# Patient Record
Sex: FEMALE | Race: WHITE | NOT HISPANIC OR LATINO | Employment: OTHER | URBAN - METROPOLITAN AREA
[De-identification: names, ages, dates, MRNs, and addresses within clinical notes are randomized per-mention and may not be internally consistent; named-entity substitution may affect disease eponyms.]

---

## 2017-03-25 DIAGNOSIS — Z13.9 SCREENING: ICD-10-CM

## 2017-03-25 DIAGNOSIS — R73.01 IFG (IMPAIRED FASTING GLUCOSE): Primary | ICD-10-CM

## 2017-03-29 LAB
BUN SERPL-MCNC: 14 MG/DL (ref 8–27)
BUN/CREAT SERPL: 22 (ref 11–26)
CALCIUM SERPL-MCNC: 9.1 MG/DL (ref 8.7–10.3)
CHLORIDE SERPL-SCNC: 105 MMOL/L (ref 96–106)
CO2 SERPL-SCNC: 23 MMOL/L (ref 18–29)
CREAT SERPL-MCNC: 0.63 MG/DL (ref 0.57–1)
GLUCOSE SERPL-MCNC: 99 MG/DL (ref 65–99)
HBA1C MFR BLD: 5.6 % (ref 4.8–5.6)
HCV AB S/CO SERPL IA: <0.1 S/CO RATIO (ref 0–0.9)
POTASSIUM SERPL-SCNC: 4.5 MMOL/L (ref 3.5–5.2)
SODIUM SERPL-SCNC: 145 MMOL/L (ref 134–144)

## 2017-03-30 ENCOUNTER — OFFICE VISIT (OUTPATIENT)
Dept: FAMILY MEDICINE CLINIC | Facility: CLINIC | Age: 67
End: 2017-03-30

## 2017-03-30 VITALS
TEMPERATURE: 98.9 F | HEIGHT: 64 IN | OXYGEN SATURATION: 98 % | DIASTOLIC BLOOD PRESSURE: 84 MMHG | WEIGHT: 226 LBS | BODY MASS INDEX: 38.58 KG/M2 | SYSTOLIC BLOOD PRESSURE: 124 MMHG | HEART RATE: 84 BPM

## 2017-03-30 DIAGNOSIS — E55.9 VITAMIN D DEFICIENCY: ICD-10-CM

## 2017-03-30 DIAGNOSIS — I10 BENIGN ESSENTIAL HYPERTENSION: Primary | ICD-10-CM

## 2017-03-30 DIAGNOSIS — R73.01 IFG (IMPAIRED FASTING GLUCOSE): ICD-10-CM

## 2017-03-30 DIAGNOSIS — E78.5 HYPERLIPIDEMIA, UNSPECIFIED HYPERLIPIDEMIA TYPE: ICD-10-CM

## 2017-03-30 PROCEDURE — 99214 OFFICE O/P EST MOD 30 MIN: CPT | Performed by: FAMILY MEDICINE

## 2017-03-30 RX ORDER — ROSUVASTATIN CALCIUM 20 MG/1
20 TABLET, COATED ORAL DAILY
Qty: 90 TABLET | Refills: 1 | Status: SHIPPED | OUTPATIENT
Start: 2017-03-30 | End: 2017-10-07 | Stop reason: SDUPTHER

## 2017-03-30 RX ORDER — LISINOPRIL 20 MG/1
20 TABLET ORAL DAILY
Qty: 90 TABLET | Refills: 1 | Status: SHIPPED | OUTPATIENT
Start: 2017-03-30 | End: 2017-05-01

## 2017-03-30 NOTE — PROGRESS NOTES
Subjective   Liza Mcnair is a 66 y.o. female.   Chief Complaint   Patient presents with   • Hypertension   • Hyperlipidemia   • Impaired fasting glucose   • Vitamin D Deficiency       History of Present Illness     #1 hypertension-diagnosed in 1980s. Patient is on lisinopril 20 mg a day. She takes it everyday. She is also on Cartia XT. It was started due to A. fib. It is managed by cardiologist Dr. Ellington.  Patient is scheduled with Dr. Ellington on 4/19/17.  Patient is on aspirin 325 mg for anticoagulation. No other anticoagulation was recommended.   Patient had catheter done in 2015 by . No chest pain, no shortness of breath, no dizziness, no LE edema. She has occasional palpitations.      #2 hypercholesterolemia-patient is on Crestor 20 mg a day. She takes it everyday. She reports mild muscle aches, no cramps. She tried 2 other statins, and tolerates Crestor the best. Muscle aches are mild and do not bother patient.      #3 vitamin D deficiency-patient is on vitamin D3 at 4000 units a day. She takes it everyday.  Vitamin D is at 51.5.     #4 impaired fasting glucose-fasting blood sugar 99, A1c at 5.6 which is better from 5.7. Patient gained 19 pounds in 6 months.  She thinks that this is because she does not have time to exercise and also she eats too much.  We talked about dietitian, but patient saw dietitian twice with her  and knows what to do.    The following portions of the patient's history were reviewed and updated as appropriate: allergies, current medications, past family history, past medical history, past social history, past surgical history and problem list.    Review of Systems   Constitutional: Negative.    Respiratory: Negative.    Cardiovascular: Positive for palpitations. Negative for chest pain and leg swelling.   Psychiatric/Behavioral: Negative.          Objective   Wt Readings from Last 3 Encounters:   03/30/17 226 lb (103 kg)   12/09/16 207 lb (93.9 kg)   10/04/16 207 lb  (93.9 kg)      Vitals:    03/30/17 1347   BP: 124/84   Pulse: 84   Temp: 98.9 °F (37.2 °C)   SpO2: 98%     Temp Readings from Last 3 Encounters:   03/30/17 98.9 °F (37.2 °C)   12/09/16 98.8 °F (37.1 °C)   10/04/16 98.4 °F (36.9 °C)     BP Readings from Last 3 Encounters:   03/30/17 124/84   12/09/16 140/80   10/04/16 126/78     Pulse Readings from Last 3 Encounters:   03/30/17 84   12/09/16 102   10/04/16 82       Physical Exam   Constitutional: She is oriented to person, place, and time. She appears well-developed and well-nourished.   HENT:   Head: Normocephalic and atraumatic.   Neck: Neck supple. Carotid bruit is not present.   Cardiovascular: Normal rate, regular rhythm and normal heart sounds.    Pulmonary/Chest: Effort normal and breath sounds normal.   Lymphadenopathy:     She has no cervical adenopathy.   Neurological: She is alert and oriented to person, place, and time.   Skin: Skin is warm, dry and intact.   Psychiatric: She has a normal mood and affect. Her behavior is normal.       Assessment/Plan   Liza was seen today for hypertension, hyperlipidemia, impaired fasting glucose and vitamin d deficiency.    Diagnoses and all orders for this visit:    Benign essential hypertension  -     Comprehensive Metabolic Panel; Future  -     Lipid Panel With LDL / HDL Ratio; Future  -     Vitamin D 25 Hydroxy; Future    Hyperlipidemia, unspecified hyperlipidemia type  -     Comprehensive Metabolic Panel; Future  -     Lipid Panel With LDL / HDL Ratio; Future  -     Vitamin D 25 Hydroxy; Future    Vitamin D deficiency  -     Comprehensive Metabolic Panel; Future  -     Lipid Panel With LDL / HDL Ratio; Future  -     Vitamin D 25 Hydroxy; Future    IFG (impaired fasting glucose)  -     Comprehensive Metabolic Panel; Future  -     Lipid Panel With LDL / HDL Ratio; Future  -     Vitamin D 25 Hydroxy; Future    Other orders  -     rosuvastatin (CRESTOR) 20 MG tablet; Take 1 tablet by mouth Daily.  -     lisinopril  (PRINIVIL,ZESTRIL) 20 MG tablet; Take 1 tablet by mouth Daily.        #1 hypertension-controlled.  Continue same.  Follow-up in 6 months.      #2 hyperlipidemia-continue current treatment.  Follow-up in 6 months.      #3 impaired fasting glucose-much better, but patient needs to work on weight loss.  She will decrease portion size and will try to increase her physical activity.    #4 vitamin D deficiency-controlled.  Continue same.

## 2017-04-10 RX ORDER — LISINOPRIL 20 MG/1
TABLET ORAL
Qty: 90 TABLET | Refills: 1 | Status: SHIPPED | OUTPATIENT
Start: 2017-04-10 | End: 2017-05-01 | Stop reason: SDUPTHER

## 2017-04-10 RX ORDER — ROSUVASTATIN CALCIUM 20 MG/1
TABLET, COATED ORAL
Qty: 90 TABLET | Refills: 1 | Status: SHIPPED | OUTPATIENT
Start: 2017-04-10 | End: 2017-07-03

## 2017-04-20 ENCOUNTER — OFFICE VISIT (OUTPATIENT)
Dept: CARDIOLOGY | Facility: CLINIC | Age: 67
End: 2017-04-20

## 2017-04-20 VITALS
HEIGHT: 64 IN | SYSTOLIC BLOOD PRESSURE: 160 MMHG | RESPIRATION RATE: 16 BRPM | WEIGHT: 228 LBS | BODY MASS INDEX: 38.93 KG/M2 | HEART RATE: 93 BPM | DIASTOLIC BLOOD PRESSURE: 100 MMHG

## 2017-04-20 DIAGNOSIS — R06.02 SHORTNESS OF BREATH: Primary | ICD-10-CM

## 2017-04-20 DIAGNOSIS — I36.1 NON-RHEUMATIC TRICUSPID VALVE INSUFFICIENCY: ICD-10-CM

## 2017-04-20 DIAGNOSIS — I34.0 NON-RHEUMATIC MITRAL REGURGITATION: ICD-10-CM

## 2017-04-20 DIAGNOSIS — I10 ESSENTIAL HYPERTENSION: ICD-10-CM

## 2017-04-20 DIAGNOSIS — I48.0 PAF (PAROXYSMAL ATRIAL FIBRILLATION) (HCC): ICD-10-CM

## 2017-04-20 PROCEDURE — 99214 OFFICE O/P EST MOD 30 MIN: CPT | Performed by: INTERNAL MEDICINE

## 2017-04-20 NOTE — PROGRESS NOTES
"Date of Office Visit: 2017  Encounter Provider: Cecil Ellington MD  Place of Service: [facility]  Patient Name: Liza Mcnair  :1950    Chief complaint: Follow-up for valvular heart disease, PAF, HTN.      History of Present Illness:        Dear Dr. Acosta:    I again had the pleasure of seeing your patient in cardiology office on 2017. As you  well know, she is a very pleasant 66 year-old white female with a past medical history  significant for hypothyroidism, hypertension, hyperlipidemia, and several other medical  issues who presents for follow-up. She underwent a stress echocardiogram on   2014, for increasing dyspnea on exertion and chest pain. This showed hypokinesis   of the mid anterior septum and distal anterior wall with exertion. She also had moderate   mitral regurgitation and her ejection fraction was 63%. During the recovery phase, she   did go into atrial fibrillation with rapid ventricular response. She had not been previously   diagnosed with atrial fibrillation. She did convert back to sinus rhythm prior to discharge.   She did state that her thyroid testing had been \"off\", and she had recently switched her   medication to Synthroid.  She subsequently underwent evaluation with a RAUL on   2014. This showed up to moderate to severe mitral regurgitation, although it was   not felt to be enough to cause her symptoms alone. Her ejection fraction was again   noted to be 60-65%. A cardiac catheterization was performed on 2014, which   showed very mild non-obstructive coronary artery disease. She also wore an event   recorder in 2015 which showed no recurrence of the atrial fibrillation.      The patient presents for follow-up.  Unfortunately, she has gained approximately 22   pounds.  She also had a recent TSH of 11.1 on 3/1/2017.  She states that she had   an upper respiratory tract infection in 2016, and she " has been more   fatigued and more short of breath since that time.  She is going to see her   endocrinologist in the upcoming days to see if her Synthroid needs to be adjusted.    Her blood pressure is also grossly elevated at 160/100 today, although she states   she parked far away and was under a significant amount of stress getting into the   office.  However, her blood pressure at home has been elevated as well, with   systolic readings above 140 frequently.  She has not had any chest pain.  She   has not had any noted atrial fibrillation, and is in sinus rhythm today.    Past Medical History:   Diagnosis Date   • Abnormal stress test     Stress echo on 8/21/14 showed hypokinesis of the mid anteroseptum and distal anterior wall.   • Allergy to IVP dye     Severe itching   • Atherosclerosis     Atherosclerosis of abdominal aorta   • Gastric ulcer    • GERD (gastroesophageal reflux disease)    • Hemangioma     Hemangioma of liver (right lobe)   • Hyperlipidemia    • Hypertension    • Hypothyroidism     History of Hashimoto's and goiter - s/p total thyroidectomy in 11/12   • Migraine    • Mitral valve insufficiency     Moderate to severe by RAUL on 9/4/14.  Mild to moderate by echo on 10/28/15.   • Osteopenia    • PAF (paroxysmal atrial fibrillation)     Afib with RVR during stress echo on 08/21/14.  None on event recorder in 01/2015.   • Vitamin D deficiency disease        Past Surgical History:   Procedure Laterality Date   • CHOLECYSTECTOMY  1992   • EYE SURGERY      cataract surgery   • THYROID SURGERY  06/07/2012   • UTERINE FIBROID SURGERY      Several uterine ablation surgeries        Current Outpatient Prescriptions on File Prior to Visit   Medication Sig Dispense Refill   • aspirin 325 MG tablet Take 1 tablet by mouth daily.     • CARTIA  MG 24 hr capsule TAKE 1 CAPSULE DAILY 90 capsule 3   • Cholecalciferol (VITAMIN D-3) 1000 UNITS capsule Take by mouth. Take as directed     • levothyroxine  "(SYNTHROID) 150 MCG tablet Take 1 tablet by mouth daily.     • lisinopril (PRINIVIL,ZESTRIL) 20 MG tablet Take 1 tablet by mouth Daily. 90 tablet 1   • lisinopril (PRINIVIL,ZESTRIL) 20 MG tablet TAKE 1 TABLET DAILY 90 tablet 1   • Multiple Vitamin (ONE DAILY ESSENTIAL) tablet Take by mouth.     • rosuvastatin (CRESTOR) 20 MG tablet Take 1 tablet by mouth Daily. 90 tablet 1   • rosuvastatin (CRESTOR) 20 MG tablet TAKE 1 TABLET DAILY 90 tablet 1   • Zinc 50 MG capsule Take 1 tablet by mouth.       No current facility-administered medications on file prior to visit.      Allergies as of 04/20/2017 - Carlos as Reviewed 04/20/2017   Allergen Reaction Noted   • Iodinated diagnostic agents  12/08/2015   • Iodine  12/08/2015   • Tartrazine  12/09/2016     Social History     Social History   • Marital status:      Spouse name: N/A   • Number of children: N/A   • Years of education: N/A     Occupational History   • Not on file.     Social History Main Topics   • Smoking status: Never Smoker   • Smokeless tobacco: Never Used   • Alcohol use No   • Drug use: No   • Sexual activity: Defer     Other Topics Concern   • Not on file     Social History Narrative     Family History   Problem Relation Age of Onset   • Goiter Mother    • Pancreatic cancer Mother    • Heart failure Father    • Hypertension Father    • Diabetes Sister    • Coronary artery disease Sister      Sister with 3 vessel CABG at age 62       Review of Systems   Constitution: Positive for malaise/fatigue and weight gain.   Cardiovascular: Positive for dyspnea on exertion.   Respiratory: Positive for cough.    Musculoskeletal: Positive for joint pain and myalgias.   Neurological: Positive for excessive daytime sleepiness.   All other systems reviewed and are negative.    Objective:     Vitals:    04/20/17 1413   BP: 160/100   BP Location: Left arm   Patient Position: Sitting   Cuff Size: Adult   Pulse: 93   Resp: 16   Weight: 228 lb (103 kg)   Height: 64\" " (162.6 cm)     Body mass index is 39.14 kg/(m^2).    Physical Exam   Constitutional: She is oriented to person, place, and time. She appears well-developed and well-nourished.   HENT:   Head: Normocephalic and atraumatic.   Eyes: Conjunctivae are normal.   Neck: Neck supple.   Cardiovascular: Normal rate and regular rhythm.  Exam reveals no gallop and no friction rub.    Murmur heard.   Systolic murmur is present with a grade of 2/6  at the lower left sternal border  Pulmonary/Chest: Effort normal and breath sounds normal.   Abdominal: Soft. There is no tenderness.   Musculoskeletal: She exhibits no edema.   Neurological: She is alert and oriented to person, place, and time.   Skin: Skin is warm.   Psychiatric: She has a normal mood and affect. Her behavior is normal.     Lab Review:   Procedures    Cardiac Procedures:  1. Transesophageal echocardiogram on 09/04/2014, showed an ejection fraction of   60-65%. There was grade 1-A diastolic dysfunction. The left atrium was moderately   dilated. There was up to moderate to severe mitral regurgitation. There was mild   tricuspid regurgitation.   2. Left heart catheterization on 09/12/2014, by Dr. Cordova showed the following. The left  anterior descending had a 30% proximal stenosis. The ramus was large and normal.   The left circumflex had a 20% proximal stenosis. 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 There was a   20% proximal stenosis and 10% diffuse distal disease.   3. Event Recorder 01/2015 showed no recurrence of atrial fibrillation.   4. Echocardiogram on 10/28/2015: Ejection fraction was 69%. There was mild to   moderate mitral regurgitation. There was trace to mild tricuspid regurgitation. There   was grade 1a diastolic dysfunction.    Assessment:       Diagnosis Plan   1. Shortness of breath  Basic Metabolic Panel    Adult Transthoracic Echo Complete   2. Essential hypertension  Basic Metabolic Panel    3. Non-rheumatic mitral regurgitation  Adult Transthoracic Echo Complete   4. Non-rheumatic tricuspid valve insufficiency  Adult Transthoracic Echo Complete   5. PAF (paroxysmal atrial fibrillation)       Plan:       As noted, she has gained approximately 22 pounds, and has had elevated blood   pressure at home.  She has also been more short of breath and more fatigue.    These symptoms may all play into her TSH being 11.1, and her endocrinologist   is going to decide soon whether or not to increase her Synthroid.  For now, I am   going to increase her lisinopril from 20 mg per day to 40 mg per day.  She will   continue to take this as well as the extended release diltiazem at 120 mg per day.    I will check a basic metabolic panel next week to ensure that her potassium and   creatinine are tolerating these changes.  I am also going to recheck an   echocardiogram given her increasing shortness of breath, as well as her history   of valvular heart disease.  Her last echocardiogram actually looked very good in   terms of the mitral and tricuspid regurgitation, although these have both been   more significant in the past.  She has not had any noted atrial fibrillation.  She   did wear an event recorder which did not show any atrial fibrillation after her   stress test in 2014.  If she does have recurrent atrial fibrillation noted, systemic   anticoagulation will need to be readdressed at that time.  I will see her back in   the next 3 months, and she will notify me if her blood pressure remains elevated   at home.    Atrial Fibrillation and Atrial Flutter  Assessment  • The patient has paroxysmal atrial fibrillation  • This is non-valvular in etiology  • The patient's CHADS2-VASc score is 3  • A [id number]-[id number] score of 2 or more is considered a high risk for a thromboembolic event  • Aspirin prescribed    Plan  • Attempt to maintain sinus rhythm  • Continue aspirin for antithrombotic therapy, bleeding issues  discussed  • Continue diltiazem for rhythm control    Subjective - Objective  • The average duration of atrial fibrillation episodes is <48 hours

## 2017-05-01 RX ORDER — LISINOPRIL 40 MG/1
40 TABLET ORAL DAILY
Qty: 90 TABLET | Refills: 1 | Status: SHIPPED | OUTPATIENT
Start: 2017-05-01 | End: 2017-10-11 | Stop reason: SDUPTHER

## 2017-05-03 ENCOUNTER — LAB (OUTPATIENT)
Dept: LAB | Facility: HOSPITAL | Age: 67
End: 2017-05-03
Attending: INTERNAL MEDICINE

## 2017-05-03 ENCOUNTER — OFFICE VISIT (OUTPATIENT)
Dept: FAMILY MEDICINE CLINIC | Facility: CLINIC | Age: 67
End: 2017-05-03

## 2017-05-03 VITALS
OXYGEN SATURATION: 97 % | WEIGHT: 228 LBS | TEMPERATURE: 98.2 F | SYSTOLIC BLOOD PRESSURE: 135 MMHG | HEIGHT: 64 IN | BODY MASS INDEX: 38.93 KG/M2 | DIASTOLIC BLOOD PRESSURE: 85 MMHG | HEART RATE: 107 BPM

## 2017-05-03 DIAGNOSIS — I10 ESSENTIAL HYPERTENSION: ICD-10-CM

## 2017-05-03 DIAGNOSIS — R06.02 SHORTNESS OF BREATH: ICD-10-CM

## 2017-05-03 DIAGNOSIS — R51.9 HEADACHE, UNSPECIFIED HEADACHE TYPE: Primary | ICD-10-CM

## 2017-05-03 LAB
ANION GAP SERPL CALCULATED.3IONS-SCNC: 14.4 MMOL/L
BUN BLD-MCNC: 14 MG/DL (ref 8–23)
BUN/CREAT SERPL: 21.2 (ref 7–25)
CALCIUM SPEC-SCNC: 9.1 MG/DL (ref 8.6–10.5)
CHLORIDE SERPL-SCNC: 102 MMOL/L (ref 98–107)
CO2 SERPL-SCNC: 25.6 MMOL/L (ref 22–29)
CREAT BLD-MCNC: 0.66 MG/DL (ref 0.57–1)
GFR SERPL CREATININE-BSD FRML MDRD: 89 ML/MIN/1.73
GLUCOSE BLD-MCNC: 95 MG/DL (ref 65–99)
POTASSIUM BLD-SCNC: 3.7 MMOL/L (ref 3.5–5.2)
SODIUM BLD-SCNC: 142 MMOL/L (ref 136–145)

## 2017-05-03 PROCEDURE — 99213 OFFICE O/P EST LOW 20 MIN: CPT | Performed by: FAMILY MEDICINE

## 2017-05-03 PROCEDURE — 80048 BASIC METABOLIC PNL TOTAL CA: CPT

## 2017-05-03 PROCEDURE — 36415 COLL VENOUS BLD VENIPUNCTURE: CPT

## 2017-05-31 ENCOUNTER — HOSPITAL ENCOUNTER (OUTPATIENT)
Dept: CARDIOLOGY | Facility: HOSPITAL | Age: 67
Discharge: HOME | End: 2017-05-31
Attending: INTERNAL MEDICINE | Admitting: INTERNAL MEDICINE

## 2017-05-31 VITALS
WEIGHT: 228 LBS | HEART RATE: 85 BPM | HEIGHT: 64 IN | BODY MASS INDEX: 38.93 KG/M2 | SYSTOLIC BLOOD PRESSURE: 136 MMHG | DIASTOLIC BLOOD PRESSURE: 86 MMHG

## 2017-05-31 DIAGNOSIS — R06.02 SHORTNESS OF BREATH: ICD-10-CM

## 2017-05-31 DIAGNOSIS — I36.1 NON-RHEUMATIC TRICUSPID VALVE INSUFFICIENCY: ICD-10-CM

## 2017-05-31 DIAGNOSIS — I34.0 NON-RHEUMATIC MITRAL REGURGITATION: ICD-10-CM

## 2017-05-31 LAB
BH CV ECHO MEAS - ACS: 2 CM
BH CV ECHO MEAS - AO MAX PG (FULL): 5.8 MMHG
BH CV ECHO MEAS - AO MAX PG: 9.6 MMHG
BH CV ECHO MEAS - AO MEAN PG (FULL): 3.2 MMHG
BH CV ECHO MEAS - AO MEAN PG: 5.1 MMHG
BH CV ECHO MEAS - AO ROOT AREA (BSA CORRECTED): 1.6
BH CV ECHO MEAS - AO ROOT AREA: 8.2 CM^2
BH CV ECHO MEAS - AO ROOT DIAM: 3.2 CM
BH CV ECHO MEAS - AO V2 MAX: 154.7 CM/SEC
BH CV ECHO MEAS - AO V2 MEAN: 103 CM/SEC
BH CV ECHO MEAS - AO V2 VTI: 38.1 CM
BH CV ECHO MEAS - AVA(I,A): 1.7 CM^2
BH CV ECHO MEAS - AVA(I,D): 1.7 CM^2
BH CV ECHO MEAS - AVA(V,A): 1.8 CM^2
BH CV ECHO MEAS - AVA(V,D): 1.8 CM^2
BH CV ECHO MEAS - BSA(HAYCOCK): 2.2 M^2
BH CV ECHO MEAS - BSA: 2.1 M^2
BH CV ECHO MEAS - BZI_BMI: 39.1 KILOGRAMS/M^2
BH CV ECHO MEAS - BZI_METRIC_HEIGHT: 162.6 CM
BH CV ECHO MEAS - BZI_METRIC_WEIGHT: 103.4 KG
BH CV ECHO MEAS - CONTRAST EF (2CH): 51.8 ML/M^2
BH CV ECHO MEAS - CONTRAST EF 4CH: 57.6 ML/M^2
BH CV ECHO MEAS - EDV(MOD-SP2): 56 ML
BH CV ECHO MEAS - EDV(MOD-SP4): 59 ML
BH CV ECHO MEAS - EDV(TEICH): 233.7 ML
BH CV ECHO MEAS - EF(CUBED): 64.9 %
BH CV ECHO MEAS - EF(MOD-SP2): 51.8 %
BH CV ECHO MEAS - EF(MOD-SP4): 57.6 %
BH CV ECHO MEAS - EF(TEICH): 55.2 %
BH CV ECHO MEAS - ESV(MOD-SP2): 27 ML
BH CV ECHO MEAS - ESV(MOD-SP4): 25 ML
BH CV ECHO MEAS - ESV(TEICH): 104.8 ML
BH CV ECHO MEAS - FS: 29.5 %
BH CV ECHO MEAS - IVS/LVPW: 1.2
BH CV ECHO MEAS - IVSD: 1.1 CM
BH CV ECHO MEAS - LAT PEAK E' VEL: 7 CM/SEC
BH CV ECHO MEAS - LV DIASTOLIC VOL/BSA (35-75): 28.5 ML/M^2
BH CV ECHO MEAS - LV MASS(C)D: 305.9 GRAMS
BH CV ECHO MEAS - LV MASS(C)DI: 147.9 GRAMS/M^2
BH CV ECHO MEAS - LV MAX PG: 3.8 MMHG
BH CV ECHO MEAS - LV MEAN PG: 1.9 MMHG
BH CV ECHO MEAS - LV SYSTOLIC VOL/BSA (12-30): 12.1 ML/M^2
BH CV ECHO MEAS - LV V1 MAX: 97.5 CM/SEC
BH CV ECHO MEAS - LV V1 MEAN: 60.3 CM/SEC
BH CV ECHO MEAS - LV V1 VTI: 21.6 CM
BH CV ECHO MEAS - LVIDD: 6.7 CM
BH CV ECHO MEAS - LVIDS: 4.7 CM
BH CV ECHO MEAS - LVLD AP2: 7 CM
BH CV ECHO MEAS - LVLD AP4: 6.6 CM
BH CV ECHO MEAS - LVLS AP2: 5.9 CM
BH CV ECHO MEAS - LVLS AP4: 6.1 CM
BH CV ECHO MEAS - LVOT AREA (M): 2.8 CM^2
BH CV ECHO MEAS - LVOT AREA: 2.9 CM^2
BH CV ECHO MEAS - LVOT DIAM: 1.9 CM
BH CV ECHO MEAS - LVPWD: 0.92 CM
BH CV ECHO MEAS - MED PEAK E' VEL: 6 CM/SEC
BH CV ECHO MEAS - MV A DUR: 0.11 SEC
BH CV ECHO MEAS - MV A MAX VEL: 98 CM/SEC
BH CV ECHO MEAS - MV DEC SLOPE: 333.8 CM/SEC^2
BH CV ECHO MEAS - MV DEC TIME: 0.19 SEC
BH CV ECHO MEAS - MV E MAX VEL: 66.9 CM/SEC
BH CV ECHO MEAS - MV E/A: 0.68
BH CV ECHO MEAS - MV MAX PG: 5.2 MMHG
BH CV ECHO MEAS - MV MEAN PG: 1.7 MMHG
BH CV ECHO MEAS - MV P1/2T MAX VEL: 66.9 CM/SEC
BH CV ECHO MEAS - MV P1/2T: 58.7 MSEC
BH CV ECHO MEAS - MV V2 MAX: 114 CM/SEC
BH CV ECHO MEAS - MV V2 MEAN: 61.3 CM/SEC
BH CV ECHO MEAS - MV V2 VTI: 19.9 CM
BH CV ECHO MEAS - MVA P1/2T LCG: 3.3 CM^2
BH CV ECHO MEAS - MVA(P1/2T): 3.7 CM^2
BH CV ECHO MEAS - MVA(VTI): 3.2 CM^2
BH CV ECHO MEAS - PA MAX PG (FULL): 0.16 MMHG
BH CV ECHO MEAS - PA MAX PG: 4 MMHG
BH CV ECHO MEAS - PA V2 MAX: 100.4 CM/SEC
BH CV ECHO MEAS - PULM A REVS DUR: 0.1 SEC
BH CV ECHO MEAS - PULM A REVS VEL: 29.1 CM/SEC
BH CV ECHO MEAS - PULM DIAS VEL: 27.2 CM/SEC
BH CV ECHO MEAS - PULM S/D: 1.4
BH CV ECHO MEAS - PULM SYS VEL: 38.3 CM/SEC
BH CV ECHO MEAS - PVA(V,A): 3.8 CM^2
BH CV ECHO MEAS - PVA(V,D): 3.8 CM^2
BH CV ECHO MEAS - QP/QS: 1.4
BH CV ECHO MEAS - RV MAX PG: 3.9 MMHG
BH CV ECHO MEAS - RV MEAN PG: 2.2 MMHG
BH CV ECHO MEAS - RV V1 MAX: 98.5 CM/SEC
BH CV ECHO MEAS - RV V1 MEAN: 68.8 CM/SEC
BH CV ECHO MEAS - RV V1 VTI: 23 CM
BH CV ECHO MEAS - RVOT AREA: 3.8 CM^2
BH CV ECHO MEAS - RVOT DIAM: 2.2 CM
BH CV ECHO MEAS - RVSP: 8 MMHG
BH CV ECHO MEAS - SI(AO): 150.3 ML/M^2
BH CV ECHO MEAS - SI(CUBED): 95.6 ML/M^2
BH CV ECHO MEAS - SI(LVOT): 30.4 ML/M^2
BH CV ECHO MEAS - SI(MOD-SP2): 14 ML/M^2
BH CV ECHO MEAS - SI(MOD-SP4): 16.4 ML/M^2
BH CV ECHO MEAS - SI(TEICH): 62.3 ML/M^2
BH CV ECHO MEAS - SV(AO): 310.9 ML
BH CV ECHO MEAS - SV(CUBED): 197.8 ML
BH CV ECHO MEAS - SV(LVOT): 63 ML
BH CV ECHO MEAS - SV(MOD-SP2): 29 ML
BH CV ECHO MEAS - SV(MOD-SP4): 34 ML
BH CV ECHO MEAS - SV(RVOT): 88.1 ML
BH CV ECHO MEAS - SV(TEICH): 128.9 ML
BH CV ECHO MEAS - TAPSE (>1.6): 2.5 CM2
BH CV XLRA - RV BASE: 2.8 CM
BH CV XLRA - TDI S': 12 CM/SEC
E/E' RATIO: 10.5
LEFT ATRIUM VOLUME INDEX: 17 ML/M2

## 2017-05-31 PROCEDURE — 93306 TTE W/DOPPLER COMPLETE: CPT

## 2017-05-31 PROCEDURE — 93306 TTE W/DOPPLER COMPLETE: CPT | Performed by: INTERNAL MEDICINE

## 2017-06-01 ENCOUNTER — TELEPHONE (OUTPATIENT)
Dept: CARDIOLOGY | Facility: CLINIC | Age: 67
End: 2017-06-01

## 2017-06-20 DIAGNOSIS — R73.01 IFG (IMPAIRED FASTING GLUCOSE): ICD-10-CM

## 2017-06-20 DIAGNOSIS — E55.9 VITAMIN D DEFICIENCY: ICD-10-CM

## 2017-06-20 DIAGNOSIS — E78.5 HYPERLIPIDEMIA, UNSPECIFIED HYPERLIPIDEMIA TYPE: ICD-10-CM

## 2017-06-20 DIAGNOSIS — I10 BENIGN ESSENTIAL HYPERTENSION: ICD-10-CM

## 2017-06-28 LAB
25(OH)D3+25(OH)D2 SERPL-MCNC: 57.6 NG/ML (ref 30–100)
ALBUMIN SERPL-MCNC: 4.1 G/DL (ref 3.6–4.8)
ALBUMIN/GLOB SERPL: 1.6 {RATIO} (ref 1.2–2.2)
ALP SERPL-CCNC: 66 IU/L (ref 39–117)
ALT SERPL-CCNC: 20 IU/L (ref 0–32)
AST SERPL-CCNC: 16 IU/L (ref 0–40)
BILIRUB SERPL-MCNC: 0.7 MG/DL (ref 0–1.2)
BUN SERPL-MCNC: 12 MG/DL (ref 8–27)
BUN/CREAT SERPL: 19 (ref 12–28)
CALCIUM SERPL-MCNC: 9.1 MG/DL (ref 8.7–10.3)
CHLORIDE SERPL-SCNC: 104 MMOL/L (ref 96–106)
CHOLEST SERPL-MCNC: 146 MG/DL (ref 100–199)
CO2 SERPL-SCNC: 25 MMOL/L (ref 18–29)
CREAT SERPL-MCNC: 0.64 MG/DL (ref 0.57–1)
GLOBULIN SER CALC-MCNC: 2.6 G/DL (ref 1.5–4.5)
GLUCOSE SERPL-MCNC: 90 MG/DL (ref 65–99)
HDLC SERPL-MCNC: 79 MG/DL
LDLC SERPL CALC-MCNC: 54 MG/DL (ref 0–99)
LDLC/HDLC SERPL: 0.7 RATIO UNITS (ref 0–3.2)
POTASSIUM SERPL-SCNC: 4.6 MMOL/L (ref 3.5–5.2)
PROT SERPL-MCNC: 6.7 G/DL (ref 6–8.5)
SODIUM SERPL-SCNC: 142 MMOL/L (ref 134–144)
TRIGL SERPL-MCNC: 63 MG/DL (ref 0–149)
VLDLC SERPL CALC-MCNC: 13 MG/DL (ref 5–40)

## 2017-07-03 ENCOUNTER — OFFICE VISIT (OUTPATIENT)
Dept: FAMILY MEDICINE CLINIC | Facility: CLINIC | Age: 67
End: 2017-07-03

## 2017-07-03 VITALS
BODY MASS INDEX: 41.11 KG/M2 | HEART RATE: 100 BPM | WEIGHT: 232 LBS | HEIGHT: 63 IN | TEMPERATURE: 98.2 F | SYSTOLIC BLOOD PRESSURE: 130 MMHG | OXYGEN SATURATION: 96 % | DIASTOLIC BLOOD PRESSURE: 82 MMHG

## 2017-07-03 DIAGNOSIS — Z00.00 PHYSICAL EXAM, ANNUAL: Primary | ICD-10-CM

## 2017-07-03 PROCEDURE — 99397 PER PM REEVAL EST PAT 65+ YR: CPT | Performed by: FAMILY MEDICINE

## 2017-07-03 NOTE — PROGRESS NOTES
Subjective   Liza Mcnair is a 66 y.o. female.   Chief Complaint   Patient presents with   • Annual Exam       History of Present Illness     #1 CPE- She is here today for complete physical exam without GYN evaluation.  She saw Dr. Rashid in September 2016.  She had pelvic exam, rectal exam and breast exam which were normal.  She reports no change in past medical, past surgical and family history.  She is not allergic to any new medications.  She does not smoke cigarettes.  She never did.  She does not drink alcohol.  She exercises twice a week.  She does water aerobics.  She has dental appointments every 6 months.  She had eye exam in July 2016.  She had colonoscopy in 2013.  She is up-to-date with mammogram and vaccinations.    The following portions of the patient's history were reviewed and updated as appropriate: allergies, current medications, past family history, past medical history, past social history, past surgical history and problem list.    Review of Systems   Constitutional: Negative.    HENT: Negative.    Respiratory: Negative.    Cardiovascular: Negative.    Gastrointestinal: Negative for blood in stool.   Musculoskeletal: Positive for arthralgias.   Skin: Negative.          Objective   Wt Readings from Last 3 Encounters:   07/03/17 232 lb (105 kg)   05/31/17 228 lb (103 kg)   05/03/17 228 lb (103 kg)      Vitals:    07/03/17 1453   BP: 130/82   Pulse: 100   Temp: 98.2 °F (36.8 °C)   SpO2: 96%     Temp Readings from Last 3 Encounters:   07/03/17 98.2 °F (36.8 °C)   05/03/17 98.2 °F (36.8 °C)   03/30/17 98.9 °F (37.2 °C)     BP Readings from Last 3 Encounters:   07/03/17 130/82   05/31/17 136/86   05/03/17 135/85     Pulse Readings from Last 3 Encounters:   07/03/17 100   05/31/17 85   05/03/17 107       Physical Exam   Constitutional: She is oriented to person, place, and time. She appears well-developed and well-nourished. No distress.   HENT:   Head: Normocephalic and atraumatic. Hair is normal.    Right Ear: Hearing, tympanic membrane, external ear and ear canal normal. No drainage. No decreased hearing is noted.   Left Ear: Hearing, tympanic membrane, external ear and ear canal normal. No decreased hearing is noted.   Nose: No nasal deformity.   Mouth/Throat: Oropharynx is clear and moist.   Eyes: Conjunctivae, EOM and lids are normal. Pupils are equal, round, and reactive to light. Right eye exhibits no discharge. Left eye exhibits no discharge.   Neck: Normal range of motion. Neck supple. No JVD present. No tracheal deviation present. No thyromegaly present.   Cardiovascular: Normal rate, regular rhythm, normal heart sounds, intact distal pulses and normal pulses.  Exam reveals no gallop and no friction rub.    No murmur heard.  Pulmonary/Chest: Effort normal and breath sounds normal. No respiratory distress. She has no wheezes. She has no rales. She exhibits no tenderness.   Abdominal: Soft. She exhibits no distension and no mass. There is no tenderness. There is no rebound and no guarding. No hernia.   Musculoskeletal: Normal range of motion. She exhibits no edema, tenderness or deformity.   Lymphadenopathy:     She has no cervical adenopathy.   Neurological: She is alert and oriented to person, place, and time. She has normal reflexes. She displays normal reflexes. No cranial nerve deficit. She exhibits normal muscle tone. Coordination normal.   Skin: Skin is warm and dry. No rash noted. She is not diaphoretic. No erythema.   Psychiatric: She has a normal mood and affect. Her behavior is normal. Judgment and thought content normal.   Vitals reviewed.      Assessment/Plan   Liza was seen today for annual exam.    Diagnoses and all orders for this visit:    Physical exam, annual        #1 CPE- blood work results were reviewed with patient.  She is up-to-date with vaccinations and cancer screening.  She needs to focus on weight loss.  We talked about increasing exercise as tolerated to at least 5 days a  week, 30 minutes a day.  Decreasing portion size.  Dietitian offered.

## 2017-07-20 RX ORDER — DILTIAZEM HYDROCHLORIDE 120 MG/1
CAPSULE, EXTENDED RELEASE ORAL
Qty: 90 CAPSULE | Refills: 2 | Status: SHIPPED | OUTPATIENT
Start: 2017-07-20 | End: 2018-04-16 | Stop reason: SDUPTHER

## 2017-07-21 ENCOUNTER — OFFICE VISIT (OUTPATIENT)
Dept: CARDIOLOGY | Facility: CLINIC | Age: 67
End: 2017-07-21

## 2017-07-21 VITALS
HEART RATE: 96 BPM | BODY MASS INDEX: 41.11 KG/M2 | OXYGEN SATURATION: 98 % | HEIGHT: 63 IN | SYSTOLIC BLOOD PRESSURE: 134 MMHG | DIASTOLIC BLOOD PRESSURE: 82 MMHG | WEIGHT: 232 LBS

## 2017-07-21 DIAGNOSIS — I34.0 NON-RHEUMATIC MITRAL REGURGITATION: ICD-10-CM

## 2017-07-21 DIAGNOSIS — I36.1 NON-RHEUMATIC TRICUSPID VALVE INSUFFICIENCY: ICD-10-CM

## 2017-07-21 DIAGNOSIS — I10 ESSENTIAL HYPERTENSION: Primary | ICD-10-CM

## 2017-07-21 DIAGNOSIS — I48.0 PAROXYSMAL ATRIAL FIBRILLATION (HCC): ICD-10-CM

## 2017-07-21 PROCEDURE — 99213 OFFICE O/P EST LOW 20 MIN: CPT | Performed by: INTERNAL MEDICINE

## 2017-07-21 RX ORDER — UBIDECARENONE 100 MG
200 CAPSULE ORAL NIGHTLY
COMMUNITY

## 2017-07-21 NOTE — PROGRESS NOTES
"Date of Office Visit: 2017  Encounter Provider: Cecil Ellington MD  Place of Service: [facility] CARDIOLOGY  Patient Name: Liza Mcnair  :1950    Chief complaint: Follow-up for valvular heart disease, PAF, HTN.    History of Present Illness:        Dear Dr. Acosta:     I again had the pleasure of seeing your patient in cardiology office on 2017. As you  well know, she is a very pleasant 66 year-old white female with a past medical history  significant for hypothyroidism, hypertension, hyperlipidemia, and several other medical  issues who presents for follow-up. She underwent a stress echocardiogram on   2014, for increasing dyspnea on exertion and chest pain. This showed hypokinesis   of the mid anterior septum and distal anterior wall with exertion. She also had moderate   mitral regurgitation and her ejection fraction was 63%. During the recovery phase, she   did go into atrial fibrillation with rapid ventricular response. She had not been previously   diagnosed with atrial fibrillation. She did convert back to sinus rhythm prior to discharge.   She did state that her thyroid testing had been \"off\", and she had recently switched her   medication to Synthroid.  She subsequently underwent evaluation with a RAUL on   2014. This showed up to moderate to severe mitral regurgitation, although it was   not felt to be enough to cause her symptoms alone. Her ejection fraction was again   noted to be 60-65%. A cardiac catheterization was performed on 2014, which   showed very mild non-obstructive coronary artery disease. She also wore an event   recorder in 2015 which showed no recurrence of the atrial fibrillation.       The patient presents for follow-up.  At her last visit, she was hypertensive, and I   increased her lisinopril to 40 mg per day.  She has been doing much better with   regards to her blood pressure since that time.  She is still " short of breath, although   she attributes some of this to weight gain.  She is going to try to lose weight.  Her   echocardiogram did not show any significant progression of her valvular disease.    He has had no chest pain or palpitations.    Past Medical History:   Diagnosis Date   • Abnormal stress test     Stress echo on 8/21/14 showed hypokinesis of the mid anteroseptum and distal anterior wall.   • Allergy to IVP dye     Severe itching   • Atherosclerosis     Atherosclerosis of abdominal aorta   • Gastric ulcer    • GERD (gastroesophageal reflux disease)    • Hemangioma     Hemangioma of liver (right lobe)   • Hyperlipidemia    • Hypertension    • Hypothyroidism     History of Hashimoto's and goiter - s/p total thyroidectomy in 11/12   • Migraine    • Mitral valve insufficiency     Moderate to severe by RAUL on 9/4/14.  Mild to moderate by echo on 10/28/15.   • Osteopenia    • PAF (paroxysmal atrial fibrillation)     Afib with RVR during stress echo on 08/21/14.  None on event recorder in 01/2015.   • Vitamin D deficiency disease        Past Surgical History:   Procedure Laterality Date   • CHOLECYSTECTOMY  1992   • EYE SURGERY      cataract surgery   • THYROID SURGERY  06/07/2012   • UTERINE FIBROID SURGERY      Several uterine ablation surgeries        Current Outpatient Prescriptions on File Prior to Visit   Medication Sig Dispense Refill   • aspirin 325 MG tablet Take 1 tablet by mouth daily.     • CARTIA  MG 24 hr capsule TAKE 1 CAPSULE DAILY 90 capsule 2   • Cholecalciferol (VITAMIN D-3) 1000 UNITS capsule Take by mouth. Take as directed     • levothyroxine (SYNTHROID) 150 MCG tablet Take 1 tablet by mouth daily.     • lisinopril (PRINIVIL,ZESTRIL) 40 MG tablet Take 1 tablet by mouth Daily. 90 tablet 1   • Multiple Vitamin (ONE DAILY ESSENTIAL) tablet Take by mouth.     • rosuvastatin (CRESTOR) 20 MG tablet Take 1 tablet by mouth Daily. 90 tablet 1   • Zinc 50 MG capsule Take 1 tablet by mouth.    "    No current facility-administered medications on file prior to visit.      Allergies as of 07/21/2017 - Carlos as Reviewed 07/21/2017   Allergen Reaction Noted   • Iodinated diagnostic agents  12/08/2015   • Iodine  12/08/2015   • Tartrazine  12/09/2016     Social History     Social History   • Marital status:      Spouse name: N/A   • Number of children: N/A   • Years of education: N/A     Occupational History   • Not on file.     Social History Main Topics   • Smoking status: Never Smoker   • Smokeless tobacco: Never Used   • Alcohol use No   • Drug use: No   • Sexual activity: Defer     Other Topics Concern   • Not on file     Social History Narrative     Family History   Problem Relation Age of Onset   • Goiter Mother    • Pancreatic cancer Mother    • Heart failure Father    • Hypertension Father    • Diabetes Sister    • Coronary artery disease Sister      Sister with 3 vessel CABG at age 62       Review of Systems   Constitution: Positive for malaise/fatigue.   Cardiovascular: Positive for dyspnea on exertion.   Musculoskeletal: Positive for joint pain.   All other systems reviewed and are negative.    Objective:     Vitals:    07/21/17 1331   BP: 134/82   Pulse: 96   SpO2: 98%   Weight: 232 lb (105 kg)   Height: 63\" (160 cm)     Body mass index is 41.1 kg/(m^2).    Physical Exam   Constitutional: She is oriented to person, place, and time. She appears well-developed and well-nourished.   HENT:   Head: Normocephalic and atraumatic.   Eyes: Conjunctivae are normal.   Neck: Neck supple.   Cardiovascular: Normal rate and regular rhythm.  Exam reveals no gallop and no friction rub.    Murmur heard.   Systolic murmur is present with a grade of 2/6  at the lower left sternal border  Pulmonary/Chest: Effort normal and breath sounds normal.   Abdominal: Soft. There is no tenderness.   Musculoskeletal: She exhibits no edema.   Neurological: She is alert and oriented to person, place, and time.   Skin: Skin is " warm.   Psychiatric: She has a normal mood and affect. Her behavior is normal.     Lab Review:   Procedures    Cardiac Procedures:  1. Transesophageal echocardiogram on 09/04/2014, showed an ejection fraction of   60-65%. There was grade 1a diastolic dysfunction. The left atrium was moderately   dilated. There was up to moderate to severe mitral regurgitation. There was mild   tricuspid regurgitation.   2. Left heart catheterization on 09/12/2014, by Dr. Cordova showed the following. The left  anterior descending had a 30% proximal stenosis. The ramus was large and normal.   The left circumflex had a 20% proximal stenosis. 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 There was a   20% proximal stenosis and 10% diffuse distal disease.   3. Event Recorder 01/2015 showed no recurrence of atrial fibrillation.   4. Echocardiogram on 10/28/2015: Ejection fraction was 69%. There was mild to   moderate mitral regurgitation. There was trace to mild tricuspid regurgitation. There   was grade 1a diastolic dysfunction.  5.  Echocardiogram on 5/31/2017: The ejection fraction was 58%.  There was grade 1   diastolic dysfunction.  The left atrium was mildly dilated.  There was mild to moderate   mitral regurgitation.  There was a small anterior pericardial effusion.      Assessment:       Diagnosis Plan   1. Essential hypertension     2. Paroxysmal atrial fibrillation     3. Non-rheumatic mitral regurgitation     4. Non-rheumatic tricuspid valve insufficiency       Plan:       As noted, the patient's blood pressure is much better at this point.  She will continue   on the Cardizem CD and lisinopril.  She is less short of breath, although she still   gets short of breath with exertion.  I feel this is multifactorial, and some of this is   likely from deconditioning and recent weight gain.  She is going to try to get more   active and lose weight.  She has not had any  palpitations, and she is in sinus rhythm.   She did wear an event recorder which did not show any atrial fibrillation after her   stress test in 2014.  If she does have any recurrent atrial fibrillation, systemic   anticoagulation will need to be readdressed at that time.  Her recent   echocardiogram showed only mild to moderate mitral regurgitation and trace   tricuspid regurgitation.  She does not need another echocardiogram for at least 2   years unless her symptoms change.  For now, I will plan on seeing her back in   the office within the next 6 months unless other issues arise.    Atrial Fibrillation and Atrial Flutter  Assessment  • The patient has paroxysmal atrial fibrillation  • This is non-valvular in etiology  • The patient's CHADS2-VASc score is 3  • A [id number]-NQOt score of 2 or more is considered a high risk for a thromboembolic event  • Aspirin prescribed     Plan  • Attempt to maintain sinus rhythm  • Continue aspirin for antithrombotic therapy, bleeding issues discussed  • Continue diltiazem for rhythm control     Subjective - Objective  • The average duration of atrial fibrillation episodes is <48 hours

## 2017-10-09 RX ORDER — ROSUVASTATIN CALCIUM 20 MG/1
TABLET, COATED ORAL
Qty: 90 TABLET | Refills: 1 | Status: SHIPPED | OUTPATIENT
Start: 2017-10-09 | End: 2022-06-14 | Stop reason: SDUPTHER

## 2017-10-11 RX ORDER — LISINOPRIL 40 MG/1
TABLET ORAL
Qty: 90 TABLET | Refills: 3 | Status: SHIPPED | OUTPATIENT
Start: 2017-10-11 | End: 2018-06-04

## 2018-01-22 ENCOUNTER — OFFICE VISIT (OUTPATIENT)
Dept: CARDIOLOGY | Facility: CLINIC | Age: 68
End: 2018-01-22

## 2018-01-22 VITALS
BODY MASS INDEX: 35.32 KG/M2 | SYSTOLIC BLOOD PRESSURE: 160 MMHG | HEIGHT: 65 IN | WEIGHT: 212 LBS | DIASTOLIC BLOOD PRESSURE: 80 MMHG | HEART RATE: 88 BPM | RESPIRATION RATE: 22 BRPM

## 2018-01-22 DIAGNOSIS — I36.1 NON-RHEUMATIC TRICUSPID VALVE INSUFFICIENCY: ICD-10-CM

## 2018-01-22 DIAGNOSIS — I48.0 PAROXYSMAL ATRIAL FIBRILLATION (HCC): ICD-10-CM

## 2018-01-22 DIAGNOSIS — I34.0 NON-RHEUMATIC MITRAL REGURGITATION: Primary | ICD-10-CM

## 2018-01-22 DIAGNOSIS — I10 ESSENTIAL HYPERTENSION: ICD-10-CM

## 2018-01-22 PROCEDURE — 99213 OFFICE O/P EST LOW 20 MIN: CPT | Performed by: INTERNAL MEDICINE

## 2018-01-22 RX ORDER — CHOLECALCIFEROL (VITAMIN D3) 125 MCG
500 CAPSULE ORAL DAILY
COMMUNITY

## 2018-01-22 RX ORDER — HYDROCHLOROTHIAZIDE 12.5 MG/1
12.5 CAPSULE, GELATIN COATED ORAL AS NEEDED
COMMUNITY
End: 2019-06-05

## 2018-01-22 RX ORDER — MULTIVIT-MIN/IRON/FOLIC ACID/K 18-600-40
4000 CAPSULE ORAL DAILY
COMMUNITY

## 2018-01-22 NOTE — PROGRESS NOTES
"Date of Office Visit: 2018  Encounter Provider: Cecil Ellington MD  Place of Service: [facility] CARDIOLOGY  Patient Name: Liza Mcnair  :1950    Chief complaint: Follow-up valvular heart disease, paroxysmal atrial fibrillation,   and hypertension.    History of Present Illness:    Dear Dr. Das:    I again had the pleasure of seeing your patient in cardiology office on 2018. As you   well know, she is a very pleasant 67 year-old white female with a past medical history  significant for hypothyroidism, hypertension, hyperlipidemia, and several other medical  issues who presents for follow-up. She underwent a stress echocardiogram on   2014, for increasing dyspnea on exertion and chest pain. This showed hypokinesis   of the mid anterior septum and distal anterior wall with exertion. She also had moderate   mitral regurgitation and her ejection fraction was 63%. During the recovery phase, she   did go into atrial fibrillation with rapid ventricular response. She had not been previously   diagnosed with atrial fibrillation. She did convert back to sinus rhythm prior to discharge.   She did state that her thyroid testing had been \"off\", and she had recently switched her   medication to Synthroid.  She subsequently underwent evaluation with a RAUL on   2014. This showed up to moderate to severe mitral regurgitation, although it was   not felt to be enough to cause her symptoms alone. Her ejection fraction was again   noted to be 60-65%. A cardiac catheterization was performed on 2014, which   showed very mild non-obstructive coronary artery disease. She also wore an event   recorder in 2015 which showed no recurrence of the atrial fibrillation.     The patient presents for follow-up.  She does state that she has been under a   significant amount of stress as her  just recently started on dialysis.  She   has not had any chest pain, " and her shortness of breath remains the same with   moderate exertion.  She has not had any palpitations or noted atrial fibrillation.  Her   blood pressure has been elevated, and she states that it typically runs 138-140   systolic.  She was recently started on HCTZ on 1/10/2018.    Past Medical History:   Diagnosis Date   • Abnormal stress test     Stress echo on 8/21/14 showed hypokinesis of the mid anteroseptum and distal anterior wall.   • Allergy to IVP dye     Severe itching   • Atherosclerosis     Atherosclerosis of abdominal aorta   • Gastric ulcer    • GERD (gastroesophageal reflux disease)    • Hemangioma     Hemangioma of liver (right lobe)   • Hyperlipidemia    • Hypertension    • Hypothyroidism     History of Hashimoto's and goiter - s/p total thyroidectomy in 11/12   • Migraine    • Mitral valve insufficiency     Moderate to severe by RAUL on 9/4/14.  Mild to moderate by echo on 10/28/15.   • Osteopenia    • PAF (paroxysmal atrial fibrillation)     Afib with RVR during stress echo on 08/21/14.  None on event recorder in 01/2015.   • Vitamin D deficiency disease        Past Surgical History:   Procedure Laterality Date   • CHOLECYSTECTOMY  1992   • EYE SURGERY      cataract surgery   • THYROID SURGERY  06/07/2012   • UTERINE FIBROID SURGERY      Several uterine ablation surgeries        Current Outpatient Prescriptions on File Prior to Visit   Medication Sig Dispense Refill   • aspirin 325 MG tablet Take 1 tablet by mouth daily.     • CARTIA  MG 24 hr capsule TAKE 1 CAPSULE DAILY 90 capsule 2   • coenzyme Q10 100 MG capsule Take 100 mg by mouth Every Night.     • levothyroxine (SYNTHROID) 150 MCG tablet Take 1 tablet by mouth daily.     • lisinopril (PRINIVIL,ZESTRIL) 40 MG tablet TAKE 1 TABLET DAILY (DOSE INCREASE) 90 tablet 3   • rosuvastatin (CRESTOR) 20 MG tablet TAKE 1 TABLET DAILY 90 tablet 1   • Zinc 50 MG capsule Take 1 tablet by mouth.     • [DISCONTINUED] Cholecalciferol (VITAMIN D-3)  "1000 UNITS capsule Take by mouth. Take as directed     • [DISCONTINUED] Multiple Vitamin (ONE DAILY ESSENTIAL) tablet Take by mouth.       No current facility-administered medications on file prior to visit.      Allergies as of 01/22/2018 - Carlos as Reviewed 01/22/2018   Allergen Reaction Noted   • Iodinated diagnostic agents  12/08/2015   • Iodine  12/08/2015   • Tartrazine  12/09/2016     Social History     Social History   • Marital status:      Spouse name: N/A   • Number of children: N/A   • Years of education: N/A     Occupational History   • Not on file.     Social History Main Topics   • Smoking status: Never Smoker   • Smokeless tobacco: Never Used   • Alcohol use No   • Drug use: No   • Sexual activity: Defer     Other Topics Concern   • Not on file     Social History Narrative     Family History   Problem Relation Age of Onset   • Goiter Mother    • Pancreatic cancer Mother    • Heart failure Father    • Hypertension Father    • Diabetes Sister    • Coronary artery disease Sister      Sister with 3 vessel CABG at age 62       Review of Systems   Constitution: Positive for malaise/fatigue.   Cardiovascular: Positive for dyspnea on exertion.   Musculoskeletal: Positive for joint pain.   Psychiatric/Behavioral: The patient is nervous/anxious.    All other systems reviewed and are negative.    Objective:     Vitals:    01/22/18 1349   BP: 160/80   Pulse: 88   Resp: 22   Weight: 96.2 kg (212 lb)   Height: 163.8 cm (64.5\")     Body mass index is 35.83 kg/(m^2).    Physical Exam   Constitutional: She is oriented to person, place, and time. She appears well-developed and well-nourished.   HENT:   Head: Normocephalic and atraumatic.   Eyes: Conjunctivae are normal.   Neck: Neck supple.   Cardiovascular: Normal rate and regular rhythm.  Exam reveals no gallop and no friction rub.    Murmur heard.   Systolic murmur is present with a grade of 2/6  at the lower left sternal border  Pulmonary/Chest: Effort " normal and breath sounds normal.   Abdominal: Soft. There is no tenderness.   Musculoskeletal: She exhibits no edema.   Neurological: She is alert and oriented to person, place, and time.   Skin: Skin is warm.   Psychiatric: She has a normal mood and affect. Her behavior is normal.     Lab Review:   Procedures    Cardiac Procedures:  1. Transesophageal echocardiogram on 09/04/2014, showed an ejection fraction of   60-65%. There was grade 1a diastolic dysfunction. The left atrium was moderately   dilated. There was up to moderate to severe mitral regurgitation. There was mild   tricuspid regurgitation.   2. Left heart catheterization on 09/12/2014, by Dr. Cordova showed the following. The left  anterior descending had a 30% proximal stenosis. The ramus was large and normal.   The left circumflex had a 20% proximal stenosis. 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 There was a   20% proximal stenosis and 10% diffuse distal disease.   3. Event Recorder 01/2015 showed no recurrence of atrial fibrillation.   4. Echocardiogram on 10/28/2015: Ejection fraction was 69%. There was mild to   moderate mitral regurgitation. There was trace to mild tricuspid regurgitation. There   was grade 1a diastolic dysfunction.  5.  Echocardiogram on 5/31/2017: The ejection fraction was 58%.  There was grade 1   diastolic dysfunction.  The left atrium was mildly dilated.  There was mild to moderate   mitral regurgitation.  There was a small anterior pericardial effusion.    Assessment:       Diagnosis Plan   1. Non-rheumatic mitral regurgitation     2. Non-rheumatic tricuspid valve insufficiency     3. Paroxysmal atrial fibrillation     4. Essential hypertension       Plan:       The patient's blood pressure has been elevated recently, although HCTZ 12.5 mg per day   was recently added to the Cardizem CD and lisinopril.  She is under a significant amount   of stress with her ,  which may be contributing to her blood pressure.  Her   shortness of breath on exertion is still the same, and I still do feel that this is multifactorial.    I do feel that some of this may be from deconditioning and recent weight gain as well. She   has not had any noted atrial fibrillation since her stress test in 2014, including an event   recorder afterwards.  I am going to keep her on aspirin only for now.  Her last   echocardiogram on 5/31/2017 showed only mild to moderate mitral regurgitation.  I will   plan on another echocardiogram at the end of this year early next year.  For now, I will   plan on seeing her back in the office within 6 months.    Atrial Fibrillation and Atrial Flutter  Assessment  • The patient has paroxysmal atrial fibrillation  • This is non-valvular in etiology  • The patient's CHADS2-VASc score is 3  • A [id number] score of 2 or more is considered a high risk for a thromboembolic event  • Aspirin prescribed    Plan  • Attempt to maintain sinus rhythm  • Continue aspirin for antithrombotic therapy, bleeding issues discussed  • Continue diltiazem for rhythm control    Subjective - Objective  • The average duration of atrial fibrillation episodes is <48 hours

## 2018-04-09 RX ORDER — ROSUVASTATIN CALCIUM 20 MG/1
TABLET, COATED ORAL
Qty: 90 TABLET | Refills: 1 | OUTPATIENT
Start: 2018-04-09

## 2018-04-16 RX ORDER — DILTIAZEM HYDROCHLORIDE 120 MG/1
CAPSULE, EXTENDED RELEASE ORAL
Qty: 90 CAPSULE | Refills: 2 | Status: SHIPPED | OUTPATIENT
Start: 2018-04-16 | End: 2019-01-13 | Stop reason: SDUPTHER

## 2018-05-30 ENCOUNTER — TELEPHONE (OUTPATIENT)
Dept: CARDIOLOGY | Facility: CLINIC | Age: 68
End: 2018-05-30

## 2018-06-04 ENCOUNTER — OFFICE VISIT (OUTPATIENT)
Dept: CARDIOLOGY | Facility: CLINIC | Age: 68
End: 2018-06-04

## 2018-06-04 VITALS
HEART RATE: 82 BPM | WEIGHT: 214 LBS | OXYGEN SATURATION: 98 % | HEIGHT: 64 IN | SYSTOLIC BLOOD PRESSURE: 148 MMHG | BODY MASS INDEX: 36.54 KG/M2 | DIASTOLIC BLOOD PRESSURE: 88 MMHG

## 2018-06-04 DIAGNOSIS — I34.0 MITRAL VALVE INSUFFICIENCY, UNSPECIFIED ETIOLOGY: ICD-10-CM

## 2018-06-04 DIAGNOSIS — I48.0 PAROXYSMAL ATRIAL FIBRILLATION (HCC): ICD-10-CM

## 2018-06-04 DIAGNOSIS — I10 BENIGN ESSENTIAL HYPERTENSION: Primary | ICD-10-CM

## 2018-06-04 PROCEDURE — 99214 OFFICE O/P EST MOD 30 MIN: CPT | Performed by: PHYSICIAN ASSISTANT

## 2018-06-04 PROCEDURE — 93000 ELECTROCARDIOGRAM COMPLETE: CPT | Performed by: PHYSICIAN ASSISTANT

## 2018-06-04 RX ORDER — OLMESARTAN MEDOXOMIL 40 MG/1
40 TABLET ORAL DAILY
COMMUNITY
Start: 2018-05-12 | End: 2022-06-14 | Stop reason: SDUPTHER

## 2018-06-04 NOTE — PROGRESS NOTES
"  Date of Office Visit: 2018  Encounter Provider: CHRISTEN Beltran  Place of Service: [facility] CARDIOLOGY  Patient Name: Liza Mcnair  :1950    Chief Complaint   Patient presents with   • Coronary Artery Disease     6 month follow up   :     HPI: Liza Mcnair is a 67 y.o. female who presents today for follow-up.  Old records have been obtained and reviewed by me.  She is a patient of Dr. Ellington's with a past cardiac history significant for paroxysmal atrial fibrillation (this occurred during a stress test and resolved without recurrence), moderate to severe mitral regurgitation on RAUL, and mild nonobstructive coronary disease.  She has had a normal EF.  She was last in our office to see Dr. Ellington on 2018.  At that visit she was under a lot of stress because her  recently started dialysis.  She had no complaints of angina or heart failure, and her shortness of breath was about the same.  Her blood pressure was a little elevated, and Dr. Ellington felt this was secondary to stress.  He did not make any changes to her medical regimen, and recommendations were for her to follow back in the office within 6 months.  She is here today earlier than that appointment because of a burning sensation in her chest as well as elevated blood pressure.   She did bring a blood pressure log with her today.  Although there are some blood pressures are elevated in the 140s to 160s systolic, the majority of her blood pressures are well-controlled anywhere in the 110s to low 130s systolic.  She did have a CT scan of the chest as well as a chest x-ray in 2018 at another facility.  This showed a \"mildly prominent\" cardiac silhouette.  The CT scan of the chest with her ascending aorta is borderline aneurysmal with 3.6 cm maximum diameter.  She states that her shortness of breath on exertion is about the same as it always has been.  Her main complaint is " heartburn.  It is not associated with exertion.  She does not think it's associated with food either.  She takes 4 chewable baby aspirin daily and has been doing so for about 6 months.  She denies any anginal type chest pain, worsening shortness of breath, edema, dizziness, or sick be.  She has no orthopnea or PND.  She states that at times her legs get weak and she feels jittery.  She gets pretty tearful today when talking about her  and his medical condition.         Past Medical History:   Diagnosis Date   • Abnormal stress test     Stress echo on 8/21/14 showed hypokinesis of the mid anteroseptum and distal anterior wall.   • Allergy to IVP dye     Severe itching   • Atherosclerosis     Atherosclerosis of abdominal aorta   • Gastric ulcer    • GERD (gastroesophageal reflux disease)    • Hemangioma     Hemangioma of liver (right lobe)   • Hyperlipidemia    • Hypertension    • Hypothyroidism     History of Hashimoto's and goiter - s/p total thyroidectomy in 11/12   • Migraine    • Mitral valve insufficiency     Moderate to severe by RAUL on 9/4/14.  Mild to moderate by echo on 10/28/15.   • Osteopenia    • PAF (paroxysmal atrial fibrillation)     Afib with RVR during stress echo on 08/21/14.  None on event recorder in 01/2015.   • Vitamin D deficiency disease        Past Surgical History:   Procedure Laterality Date   • CHOLECYSTECTOMY  1992   • EYE SURGERY      cataract surgery   • THYROID SURGERY  06/07/2012   • UTERINE FIBROID SURGERY      Several uterine ablation surgeries        Social History     Social History   • Marital status:      Spouse name: N/A   • Number of children: N/A   • Years of education: N/A     Occupational History   • Not on file.     Social History Main Topics   • Smoking status: Never Smoker   • Smokeless tobacco: Never Used   • Alcohol use No   • Drug use: No   • Sexual activity: Defer     Other Topics Concern   • Not on file     Social History Narrative   • No narrative on  file       Family History   Problem Relation Age of Onset   • Goiter Mother    • Pancreatic cancer Mother    • Heart failure Father    • Hypertension Father    • Diabetes Sister    • Coronary artery disease Sister         Sister with 3 vessel CABG at age 62   • No Known Problems Maternal Grandmother    • No Known Problems Maternal Grandfather    • No Known Problems Paternal Grandmother    • No Known Problems Paternal Grandfather        Review of Systems   Constitution: Negative for chills, fever and malaise/fatigue.   Cardiovascular: Positive for dyspnea on exertion. Negative for chest pain, leg swelling, near-syncope, orthopnea, palpitations, paroxysmal nocturnal dyspnea and syncope.   Respiratory: Negative for cough and shortness of breath.    Musculoskeletal: Negative for joint pain, joint swelling and myalgias.   Gastrointestinal: Positive for heartburn. Negative for abdominal pain, diarrhea, melena, nausea and vomiting.   Genitourinary: Negative for frequency and hematuria.   Neurological: Negative for light-headedness, numbness, paresthesias and seizures.   Psychiatric/Behavioral: The patient is nervous/anxious.    Allergic/Immunologic: Negative.    All other systems reviewed and are negative.      Allergies   Allergen Reactions   • Iodinated Diagnostic Agents    • Iodine    • Tartrazine      Cat scan dye         Current Outpatient Prescriptions:   •  aspirin 325 MG tablet, Take 1 tablet by mouth daily., Disp: , Rfl:   •  CARTIA  MG 24 hr capsule, TAKE 1 CAPSULE DAILY, Disp: 90 capsule, Rfl: 2  •  Cholecalciferol (VITAMIN D) 2000 units capsule, Take 4,000 Units by mouth Daily., Disp: , Rfl:   •  coenzyme Q10 100 MG capsule, Take 100 mg by mouth Every Night., Disp: , Rfl:   •  hydrochlorothiazide (MICROZIDE) 12.5 MG capsule, Take 12.5 mg by mouth As Needed., Disp: , Rfl:   •  levothyroxine (SYNTHROID) 150 MCG tablet, Take 1 tablet by mouth daily., Disp: , Rfl:   •  olmesartan (BENICAR) 40 MG tablet, Take  "40 mg by mouth Daily., Disp: , Rfl:   •  rosuvastatin (CRESTOR) 20 MG tablet, TAKE 1 TABLET DAILY, Disp: 90 tablet, Rfl: 1  •  vitamin B-12 (CYANOCOBALAMIN) 500 MCG tablet, Take 500 mcg by mouth Daily., Disp: , Rfl:       Objective:     Vitals:    06/04/18 1122 06/04/18 1134   BP: 132/80 148/88   BP Location: Right arm Left arm   Pulse: 82    SpO2: 98%    Weight: 97.1 kg (214 lb)    Height: 162.6 cm (64\")      Body mass index is 36.73 kg/m².    PHYSICAL EXAM:    Physical Exam   Constitutional: She is oriented to person, place, and time. She appears well-developed and well-nourished. No distress.   HENT:   Head: Normocephalic and atraumatic.   Eyes: Pupils are equal, round, and reactive to light.   Neck: No JVD present. No thyromegaly present.   Cardiovascular: Normal rate, regular rhythm, normal heart sounds and intact distal pulses.    No murmur heard.  Pulmonary/Chest: Effort normal and breath sounds normal. No respiratory distress.   Abdominal: Soft. Bowel sounds are normal. She exhibits no distension. There is no splenomegaly or hepatomegaly. There is no tenderness.   Musculoskeletal: Normal range of motion. She exhibits no edema.   Neurological: She is alert and oriented to person, place, and time.   Skin: Skin is warm and dry. She is not diaphoretic. No erythema.   Psychiatric: She has a normal mood and affect. Her behavior is normal. Judgment normal.         ECG 12 Lead  Date/Time: 6/4/2018 11:42 AM  Performed by: REHANA REED  Authorized by: REHANA REED   Comparison: not compared with previous ECG   Previous ECG: no previous ECG available  Rhythm: sinus rhythm  BPM: 82  Clinical impression: normal ECG  Comments: Indication: Mitral regurgitation.              Assessment:       Diagnosis Plan   1. Benign essential hypertension  ECG 12 Lead   2. Paroxysmal atrial fibrillation  ECG 12 Lead   3. Mitral valve insufficiency, unspecified etiology  ECG 12 Lead     Orders Placed This Encounter   Procedures "   • ECG 12 Lead     This order was created via procedure documentation          Plan:       1.  Hypertension.  Her blood pressure today is well-controlled at 132/80.  Her blood pressure log actually looks pretty good.  She is only taking her hydrochlorothiazide as needed, and she rarely takes this because she does not like the way that it makes her feel.  Her primary care physician does manage her blood pressure, I am going to defer back to her PCP.  Right now I would not increase her add anything because most of the time her blood pressure is within normal limits.  Certainly we did talk about sodium restriction as well as regular exercise and weight loss.    2.  Mitral regurgitation.  Her shortness of breath is stable and unchanged.  Dr. Ellington will plan for an echocardiogram at the end of this year or beginning of next.    3.  Atrial Fibrillation and Atrial Flutter  Assessment  • The patient has paroxysmal atrial fibrillation  • This is valvular in etiology  • The patient's CHADS2-VASc score is 3  • A [id number]-CTVa score of 2 or more is considered a high risk for a thromboembolic event  • Aspirin prescribed    Plan  • Attempt to maintain sinus rhythm  • Continue aspirin for antithrombotic therapy, bleeding issues discussed  • Continue diltiazem for rhythm control  • Continue diltiazem for rate control    4.  Heartburn.  I do not think that this is cardiac in etiology.  I've asked her to switch to enteric coated aspirin.  Next    She will follow-up with Dr. Ellington later on this fall.    As always, it has been a pleasure to participate in your patient's care.      Sincerely,         Joi Randall PA-C

## 2018-08-23 ENCOUNTER — TELEPHONE (OUTPATIENT)
Dept: CARDIOLOGY | Facility: CLINIC | Age: 68
End: 2018-08-23

## 2018-11-16 ENCOUNTER — OFFICE VISIT (OUTPATIENT)
Dept: CARDIOLOGY | Facility: CLINIC | Age: 68
End: 2018-11-16

## 2018-11-16 VITALS
WEIGHT: 224 LBS | BODY MASS INDEX: 39.69 KG/M2 | HEART RATE: 86 BPM | DIASTOLIC BLOOD PRESSURE: 70 MMHG | SYSTOLIC BLOOD PRESSURE: 118 MMHG | HEIGHT: 63 IN

## 2018-11-16 DIAGNOSIS — I10 ESSENTIAL HYPERTENSION: Primary | ICD-10-CM

## 2018-11-16 DIAGNOSIS — I34.0 MITRAL VALVE INSUFFICIENCY, UNSPECIFIED ETIOLOGY: ICD-10-CM

## 2018-11-16 DIAGNOSIS — I48.0 PAF (PAROXYSMAL ATRIAL FIBRILLATION) (HCC): ICD-10-CM

## 2018-11-16 PROCEDURE — 99214 OFFICE O/P EST MOD 30 MIN: CPT | Performed by: NURSE PRACTITIONER

## 2018-11-16 NOTE — PROGRESS NOTES
Date of Office Visit: 2018  Encounter Provider: MIMI Hinojosa  Place of Service: [facility] CARDIOLOGY  Patient Name: Liza Mcnair  :1950    Chief Complaint   Patient presents with   • Coronary Artery Disease   • Cardiac Valve Problem   • Atrial Fibrillation   • Hypertension   • Hyperlipidemia   :     HPI: Liza Mcnair is a 68 y.o. female is a patient of Dr. Ellington. I have reviewed her record.     Her past medical history is significant of paroxysmal atrial fibrillation, mitral regurgitation, and hypertension.    She had an episode of atrial fibrillation which occurred with a stress test in  but did not recur.  She later was noted moderate to severe mitral regurgitation with RAUL.  Also noted to have mild nonobstructive coronary disease on cardiac cath 2014.  The ventricular systolic function was normal ejection fraction.  She later wore an event monitor in 2015 which showed premature ventricular contractions and occasional premature atrial contraction but no arrhythmia or atrial fibrillation.      She later had issues with elevated blood pressure felt to be related to stress.  She later reported that she only took Hydrochlorothiazide as needed because she did not like the way it made her feel.    Patient presents today for 6 month follow-up.  She continues to have shortness of breath with exertion which is unchanged.  She denies chest pain tightness pressure, dizziness, lightheadedness, palpitations.  She stays very active and helps to care for her  and does housework and grocery shopping without issues.  She overall is better than earlier in the year.  She reports that there is left lung nodule resolved and she continues to follow with pulmonary.  She sleeps in a recliner and reports a mild sleep apnea in the past but was not recommended to wear CPAP or BiPAP.  She checks her blood pressure at home which averages in the 120s over 70s.  She  "actually had her wrist cuff checked today which was accurate.  She takes hydrochlorothiazide on an as-needed basis for ankle edema.  She takes it maybe twice per week.      Allergies   Allergen Reactions   • Iodinated Diagnostic Agents Itching   • Iodine Itching   • Tartrazine Itching     Cat scan dye       Past Medical History:   Diagnosis Date   • Abnormal stress test     Stress echo on 8/21/14 showed hypokinesis of the mid anteroseptum and distal anterior wall.   • Allergy to IVP dye     Severe itching   • Atherosclerosis     Atherosclerosis of abdominal aorta   • Gastric ulcer    • GERD (gastroesophageal reflux disease)    • Hemangioma     Hemangioma of liver (right lobe)   • Hyperlipidemia    • Hypertension    • Hypothyroidism     History of Hashimoto's and goiter - s/p total thyroidectomy in 11/12   • Migraine    • Mitral valve insufficiency     Moderate to severe by RAUL on 9/4/14.  Mild to moderate by echo on 10/28/15.   • Non-rheumatic mitral regurgitation    • Non-rheumatic tricuspid valve insufficiency    • Osteopenia    • PAF (paroxysmal atrial fibrillation) (CMS/HCC)     Afib with RVR during stress echo on 08/21/14.  None on event recorder in 01/2015.   • Vitamin D deficiency disease        Past Surgical History:   Procedure Laterality Date   • CHOLECYSTECTOMY  1992   • EYE SURGERY      cataract surgery   • THYROID SURGERY  06/07/2012   • UTERINE FIBROID SURGERY      Several uterine ablation surgeries          Family and social history reviewed.     Review of Systems   Constitution: Positive for malaise/fatigue.   Cardiovascular: Positive for dyspnea on exertion.   Musculoskeletal: Positive for joint pain and myalgias.     All other systems were reviewed and are negative          Objective:     Vitals:    11/16/18 1506   BP: 118/70   BP Location: Left arm   Patient Position: Sitting   Pulse: 86   Weight: 102 kg (224 lb)   Height: 158.8 cm (62.5\")     Body mass index is 40.32 kg/m².    PHYSICAL " EXAM:  Physical Exam   Constitutional: She is oriented to person, place, and time. She appears well-developed and well-nourished.   HENT:   Head: Normocephalic and atraumatic.   Eyes: Conjunctivae are normal.   Neck: Neck supple.   Cardiovascular: Normal rate and regular rhythm. Exam reveals no gallop and no friction rub.   Murmur heard.   Systolic murmur is present with a grade of 2/6 at the lower left sternal border.  Pulmonary/Chest: Effort normal and breath sounds normal.   Abdominal: Soft. There is no tenderness.   Musculoskeletal: She exhibits no edema.   Neurological: She is alert and oriented to person, place, and time.   Skin: Skin is warm.   Psychiatric: She has a normal mood and affect. Her behavior is normal.       Procedures- not performed     Current Outpatient Medications   Medication Sig Dispense Refill   • Albuterol (VENTOLIN IN) Inhale As Needed.     • aspirin 325 MG tablet Take 1 tablet by mouth daily.     • CARTIA  MG 24 hr capsule TAKE 1 CAPSULE DAILY 90 capsule 2   • Cholecalciferol (VITAMIN D) 2000 units capsule Take 4,000 Units by mouth Daily.     • coenzyme Q10 100 MG capsule Take 100 mg by mouth Every Night.     • hydrochlorothiazide (MICROZIDE) 12.5 MG capsule Take 12.5 mg by mouth As Needed.     • levothyroxine (SYNTHROID) 150 MCG tablet Take 1 tablet by mouth daily.     • Mometasone Furoate (ASMANEX HFA IN) Inhale 2 (Two) Times a Day.     • olmesartan (BENICAR) 40 MG tablet Take 40 mg by mouth Daily.     • rosuvastatin (CRESTOR) 20 MG tablet TAKE 1 TABLET DAILY 90 tablet 1   • vitamin B-12 (CYANOCOBALAMIN) 500 MCG tablet Take 500 mcg by mouth Daily.       No current facility-administered medications for this visit.      Assessment:       Diagnosis Plan   1. Essential hypertension     2. Mitral valve insufficiency, unspecified etiology  Adult Transthoracic Echo Complete W/ Cont if Necessary Per Protocol   3. PAF (paroxysmal atrial fibrillation) (CMS/[facility])          Orders Placed This  Encounter   Procedures   • Adult Transthoracic Echo Complete W/ Cont if Necessary Per Protocol     Standing Status:   Future     Standing Expiration Date:   11/16/2019     Order Specific Question:   Reason for exam?     Answer:   Valvular Function     Order Specific Question:   Valvular Function specification?     Answer:   Native Valvular Regurg     Order Specific Question:   Native Valvular Regurg severity?     Answer:   Moderate     Comments:   MR         Plan:         1.  Hypertension well controlled continue the same  2.  Mitral regurgitation mild to moderate on echo 05/2017. She will have a repeat echo prior to next appointment in 06/2018  3. Nonobstructive coronary artery disease on cardiac cath 09/2014  4. Hyperlipidemia on rosuvastatin 20 mg  5.  History of atrial fibrillation induced by stress test no recurrent negative event monitor 01/2015. She denies palpitations and will call the office if she begins to experience that for home monitoring.   Atrial Fibrillation and Atrial Flutter  Assessment  • The patient has paroxysmal atrial fibrillation  • This is valvular in etiology  • The patient's CHADS2-VASc score is 3  • A [id number] score of 2 or more is considered a high risk for a thromboembolic event  • Aspirin prescribed    Plan  • Attempt to maintain sinus rhythm  • Continue aspirin for antithrombotic therapy, bleeding issues discussed  • Continue diltiazem for rhythm control  • Continue diltiazem for rate control      6. Hypothyroidism on replacement therapy      Follow up in 06/2019 as scheduled with Dr. Hodges            It has been a pleasure to participate in this patient's care.      Thank you,  MIMI Hinojosa      **[employer] Disclaimer:**  Much of this encounter note is an electronic transcription/translation of spoken language to printed text. The electronic translation of spoken language may permit erroneous, or at times, nonsensical words or phrases to be inadvertently transcribed.  Although I have reviewed the note for such errors, some may still exist.

## 2018-12-14 ENCOUNTER — APPOINTMENT (OUTPATIENT)
Dept: CARDIOLOGY | Facility: HOSPITAL | Age: 68
End: 2018-12-14

## 2018-12-27 ENCOUNTER — TELEPHONE (OUTPATIENT)
Dept: CARDIOLOGY | Facility: CLINIC | Age: 68
End: 2018-12-27

## 2018-12-27 ENCOUNTER — HOSPITAL ENCOUNTER (OUTPATIENT)
Dept: CARDIOLOGY | Facility: HOSPITAL | Age: 68
Discharge: HOME | End: 2018-12-27
Admitting: NURSE PRACTITIONER

## 2018-12-27 VITALS
HEIGHT: 62 IN | WEIGHT: 224 LBS | BODY MASS INDEX: 41.22 KG/M2 | SYSTOLIC BLOOD PRESSURE: 130 MMHG | HEART RATE: 83 BPM | OXYGEN SATURATION: 95 % | DIASTOLIC BLOOD PRESSURE: 70 MMHG

## 2018-12-27 DIAGNOSIS — I34.0 MITRAL VALVE INSUFFICIENCY, UNSPECIFIED ETIOLOGY: ICD-10-CM

## 2018-12-27 PROCEDURE — 93306 TTE W/DOPPLER COMPLETE: CPT | Performed by: INTERNAL MEDICINE

## 2018-12-27 PROCEDURE — 25010000002 PERFLUTREN (DEFINITY) 8.476 MG IN SODIUM CHLORIDE 0.9 % 10 ML INJECTION: Performed by: NURSE PRACTITIONER

## 2018-12-27 PROCEDURE — 93306 TTE W/DOPPLER COMPLETE: CPT

## 2018-12-27 RX ADMIN — PERFLUTREN 2 ML: 6.52 INJECTION, SUSPENSION INTRAVENOUS at 15:34

## 2018-12-28 LAB
ASCENDING AORTA: 2.6 CM
BH CV ECHO MEAS - ACS: 2.2 CM
BH CV ECHO MEAS - AO MAX PG (FULL): 4.3 MMHG
BH CV ECHO MEAS - AO MAX PG: 9.4 MMHG
BH CV ECHO MEAS - AO MEAN PG (FULL): 2.3 MMHG
BH CV ECHO MEAS - AO MEAN PG: 5 MMHG
BH CV ECHO MEAS - AO ROOT AREA (BSA CORRECTED): 1.6
BH CV ECHO MEAS - AO ROOT AREA: 7.8 CM^2
BH CV ECHO MEAS - AO ROOT DIAM: 3.2 CM
BH CV ECHO MEAS - AO V2 MAX: 153.4 CM/SEC
BH CV ECHO MEAS - AO V2 MEAN: 103.4 CM/SEC
BH CV ECHO MEAS - AO V2 VTI: 26.6 CM
BH CV ECHO MEAS - ASC AORTA: 2.6 CM
BH CV ECHO MEAS - AVA(I,A): 2.8 CM^2
BH CV ECHO MEAS - AVA(I,D): 2.8 CM^2
BH CV ECHO MEAS - AVA(V,A): 2.3 CM^2
BH CV ECHO MEAS - AVA(V,D): 2.3 CM^2
BH CV ECHO MEAS - BSA(HAYCOCK): 2.2 M^2
BH CV ECHO MEAS - BSA: 2 M^2
BH CV ECHO MEAS - BZI_BMI: 41 KILOGRAMS/M^2
BH CV ECHO MEAS - BZI_METRIC_HEIGHT: 157.5 CM
BH CV ECHO MEAS - BZI_METRIC_WEIGHT: 101.6 KG
BH CV ECHO MEAS - EDV(MOD-SP2): 69 ML
BH CV ECHO MEAS - EDV(MOD-SP4): 80 ML
BH CV ECHO MEAS - EDV(TEICH): 235.5 ML
BH CV ECHO MEAS - EF(CUBED): 60.5 %
BH CV ECHO MEAS - EF(MOD-BP): 63 %
BH CV ECHO MEAS - EF(MOD-SP2): 62.3 %
BH CV ECHO MEAS - EF(MOD-SP4): 63.8 %
BH CV ECHO MEAS - EF(TEICH): 50.8 %
BH CV ECHO MEAS - ESV(MOD-SP2): 26 ML
BH CV ECHO MEAS - ESV(MOD-SP4): 29 ML
BH CV ECHO MEAS - ESV(TEICH): 115.8 ML
BH CV ECHO MEAS - IVS/LVPW: 1.1
BH CV ECHO MEAS - IVSD: 1.4 CM
BH CV ECHO MEAS - LAT PEAK E' VEL: 6 CM/SEC
BH CV ECHO MEAS - LV DIASTOLIC VOL/BSA (35-75): 39.9 ML/M^2
BH CV ECHO MEAS - LV MASS(C)D: 430.2 GRAMS
BH CV ECHO MEAS - LV MASS(C)DI: 214.5 GRAMS/M^2
BH CV ECHO MEAS - LV MAX PG: 5.1 MMHG
BH CV ECHO MEAS - LV MEAN PG: 2.7 MMHG
BH CV ECHO MEAS - LV SYSTOLIC VOL/BSA (12-30): 14.5 ML/M^2
BH CV ECHO MEAS - LV V1 MAX: 112.6 CM/SEC
BH CV ECHO MEAS - LV V1 MEAN: 75.3 CM/SEC
BH CV ECHO MEAS - LV V1 VTI: 23.3 CM
BH CV ECHO MEAS - LVIDD: 6.8 CM
BH CV ECHO MEAS - LVIDS: 5 CM
BH CV ECHO MEAS - LVLD AP2: 6.7 CM
BH CV ECHO MEAS - LVLD AP4: 7.2 CM
BH CV ECHO MEAS - LVLS AP2: 5.8 CM
BH CV ECHO MEAS - LVLS AP4: 5.7 CM
BH CV ECHO MEAS - LVOT AREA (M): 3.1 CM^2
BH CV ECHO MEAS - LVOT AREA: 3.2 CM^2
BH CV ECHO MEAS - LVOT DIAM: 2 CM
BH CV ECHO MEAS - LVPWD: 1.3 CM
BH CV ECHO MEAS - MED PEAK E' VEL: 5 CM/SEC
BH CV ECHO MEAS - MV A DUR: 0.1 SEC
BH CV ECHO MEAS - MV A MAX VEL: 97.9 CM/SEC
BH CV ECHO MEAS - MV DEC SLOPE: 496.7 CM/SEC^2
BH CV ECHO MEAS - MV DEC TIME: 0.21 SEC
BH CV ECHO MEAS - MV E MAX VEL: 74.1 CM/SEC
BH CV ECHO MEAS - MV E/A: 0.76
BH CV ECHO MEAS - MV MAX PG: 3.8 MMHG
BH CV ECHO MEAS - MV MEAN PG: 2.1 MMHG
BH CV ECHO MEAS - MV P1/2T MAX VEL: 93.7 CM/SEC
BH CV ECHO MEAS - MV P1/2T: 55.2 MSEC
BH CV ECHO MEAS - MV V2 MAX: 97.7 CM/SEC
BH CV ECHO MEAS - MV V2 MEAN: 67.9 CM/SEC
BH CV ECHO MEAS - MV V2 VTI: 25.3 CM
BH CV ECHO MEAS - MVA P1/2T LCG: 2.3 CM^2
BH CV ECHO MEAS - MVA(P1/2T): 4 CM^2
BH CV ECHO MEAS - MVA(VTI): 2.9 CM^2
BH CV ECHO MEAS - PA ACC TIME: 0.08 SEC
BH CV ECHO MEAS - PA MAX PG (FULL): 0.8 MMHG
BH CV ECHO MEAS - PA MAX PG: 5.1 MMHG
BH CV ECHO MEAS - PA PR(ACCEL): 44.1 MMHG
BH CV ECHO MEAS - PA V2 MAX: 112.7 CM/SEC
BH CV ECHO MEAS - PULM A REVS DUR: 0.09 SEC
BH CV ECHO MEAS - PULM A REVS VEL: 29.5 CM/SEC
BH CV ECHO MEAS - PULM DIAS VEL: 28.9 CM/SEC
BH CV ECHO MEAS - PULM S/D: 1.4
BH CV ECHO MEAS - PULM SYS VEL: 41.8 CM/SEC
BH CV ECHO MEAS - PVA(V,A): 2 CM^2
BH CV ECHO MEAS - PVA(V,D): 2 CM^2
BH CV ECHO MEAS - QP/QS: 0.52
BH CV ECHO MEAS - RV MAX PG: 4.3 MMHG
BH CV ECHO MEAS - RV MEAN PG: 2.7 MMHG
BH CV ECHO MEAS - RV V1 MAX: 103.4 CM/SEC
BH CV ECHO MEAS - RV V1 MEAN: 77 CM/SEC
BH CV ECHO MEAS - RV V1 VTI: 18.2 CM
BH CV ECHO MEAS - RVOT AREA: 2.1 CM^2
BH CV ECHO MEAS - RVOT DIAM: 1.6 CM
BH CV ECHO MEAS - SI(AO): 103.8 ML/M^2
BH CV ECHO MEAS - SI(CUBED): 92.9 ML/M^2
BH CV ECHO MEAS - SI(LVOT): 37.2 ML/M^2
BH CV ECHO MEAS - SI(MOD-SP2): 21.4 ML/M^2
BH CV ECHO MEAS - SI(MOD-SP4): 25.4 ML/M^2
BH CV ECHO MEAS - SI(TEICH): 59.7 ML/M^2
BH CV ECHO MEAS - SV(AO): 208.2 ML
BH CV ECHO MEAS - SV(CUBED): 186.3 ML
BH CV ECHO MEAS - SV(LVOT): 74.6 ML
BH CV ECHO MEAS - SV(MOD-SP2): 43 ML
BH CV ECHO MEAS - SV(MOD-SP4): 51 ML
BH CV ECHO MEAS - SV(RVOT): 38.8 ML
BH CV ECHO MEAS - SV(TEICH): 119.7 ML
BH CV ECHO MEAS - TAPSE (>1.6): 3.2 CM2
BH CV ECHO MEASUREMENTS AVERAGE E/E' RATIO: 13.47
BH CV XLRA - RV BASE: 2.7 CM
BH CV XLRA - TDI S': 11 CM/SEC
LEFT ATRIUM VOLUME INDEX: 19 ML/M2
MAXIMAL PREDICTED HEART RATE: 151 BPM
SINUS: 3 CM
STJ: 3.1 CM
STRESS TARGET HR: 128 BPM

## 2019-01-14 RX ORDER — DILTIAZEM HYDROCHLORIDE 120 MG/1
CAPSULE, EXTENDED RELEASE ORAL
Qty: 90 CAPSULE | Refills: 2 | Status: SHIPPED | OUTPATIENT
Start: 2019-01-14 | End: 2019-06-05 | Stop reason: SDUPTHER

## 2019-06-05 ENCOUNTER — OFFICE VISIT (OUTPATIENT)
Dept: CARDIOLOGY | Facility: CLINIC | Age: 69
End: 2019-06-05

## 2019-06-05 VITALS
DIASTOLIC BLOOD PRESSURE: 84 MMHG | HEIGHT: 62 IN | WEIGHT: 230 LBS | SYSTOLIC BLOOD PRESSURE: 150 MMHG | BODY MASS INDEX: 42.33 KG/M2 | HEART RATE: 75 BPM

## 2019-06-05 DIAGNOSIS — E78.5 HYPERLIPIDEMIA, UNSPECIFIED HYPERLIPIDEMIA TYPE: ICD-10-CM

## 2019-06-05 DIAGNOSIS — G47.33 OBSTRUCTIVE SLEEP APNEA SYNDROME: ICD-10-CM

## 2019-06-05 DIAGNOSIS — I48.0 PAROXYSMAL ATRIAL FIBRILLATION (HCC): ICD-10-CM

## 2019-06-05 DIAGNOSIS — I10 BENIGN ESSENTIAL HYPERTENSION: Primary | ICD-10-CM

## 2019-06-05 DIAGNOSIS — R06.09 DYSPNEA ON EXERTION: ICD-10-CM

## 2019-06-05 PROCEDURE — 99214 OFFICE O/P EST MOD 30 MIN: CPT | Performed by: INTERNAL MEDICINE

## 2019-06-05 PROCEDURE — 93000 ELECTROCARDIOGRAM COMPLETE: CPT | Performed by: INTERNAL MEDICINE

## 2019-06-05 RX ORDER — ASPIRIN 325 MG
325 TABLET ORAL DAILY
COMMUNITY
End: 2022-02-08

## 2019-06-05 RX ORDER — LEVOTHYROXINE SODIUM 112 UG/1
66 TABLET ORAL WEEKLY
COMMUNITY
End: 2019-09-09

## 2019-06-05 RX ORDER — DILTIAZEM HYDROCHLORIDE 180 MG/1
180 CAPSULE, COATED, EXTENDED RELEASE ORAL NIGHTLY
Qty: 90 CAPSULE | Refills: 3 | Status: SHIPPED | OUTPATIENT
Start: 2019-06-05 | End: 2019-06-20 | Stop reason: SDUPTHER

## 2019-06-05 NOTE — PROGRESS NOTES
Date of Office Visit: 2019  Encounter Provider: Kelly Hodges MD  Place of Service: [facility] CARDIOLOGY  Patient Name: Liza Mcnair  :1950      Patient ID:  Liza Mcnair is a 68 y.o. female is here for  followup for CAD.         History of Present Illness    She has a history of mild nonobstructive coronary artery disease by cardiac catheterization done 2014.  She had atrial fibrillation induced on a stress study which is never recurred and had a normal event recorder done 2015.  She has hypertension.  She has mild to moderate mitral insufficiency noted on echo 2017.  She has hypertension and is on diltiazem, olmesartan, hydrochlorthiazide.  She has hyperlipidemia for which she takes rosuvastatin.  She has hypothyroidism and is on levothyroxine.    She has not been taking hydrochlorothiazide because it makes her feel funny.  She had echocardiogram done 2018 showing ejection fraction 63% with normal valves and grade 1 diastolic dysfunction.  She has been noticing exertional dyspnea and decreased stamina with activity.  She is the primary caregiver at home as her  is dialysis dependent.  Her stamina has been somewhat poor and her legs sometimes feel like he do not have power to do what she needs to do.  She does have osteoarthritis.  She sees Dr. Cooley for asthma.  She is been noticing her blood pressure at home is high running 150s to 170s.  She has no chest pain or pressure.  She does not feel her heart racing or skipping.  She had no dizziness, syncope or falls.  She had labs done 19, HDL 85, LDL 74 and normal CMP and thyroid 19.     She does not smoke or use alcohol.     Past Medical History:   Diagnosis Date   • Abnormal stress test     Stress echo on 14 showed hypokinesis of the mid anteroseptum and distal anterior wall.   • Allergy to IVP dye     Severe itching   • Atherosclerosis     Atherosclerosis of abdominal aorta   •  Gastric ulcer    • GERD (gastroesophageal reflux disease)    • Hemangioma     Hemangioma of liver (right lobe)   • Hyperlipidemia    • Hypertension    • Hypothyroidism     History of Hashimoto's and goiter - s/p total thyroidectomy in 11/12   • Migraine    • Mitral valve insufficiency     Moderate to severe by RAUL on 9/4/14.  Mild to moderate by echo on 10/28/15.   • Non-rheumatic mitral regurgitation    • Non-rheumatic tricuspid valve insufficiency    • Osteopenia    • PAF (paroxysmal atrial fibrillation) (CMS/HCC)     Afib with RVR during stress echo on 08/21/14.  None on event recorder in 01/2015.   • Vitamin D deficiency disease          Past Surgical History:   Procedure Laterality Date   • CHOLECYSTECTOMY  1992   • EYE SURGERY      cataract surgery   • THYROID SURGERY  06/07/2012   • UTERINE FIBROID SURGERY      Several uterine ablation surgeries        Current Outpatient Medications on File Prior to Visit   Medication Sig Dispense Refill   • aspirin 81 MG tablet Take 81 mg by mouth Daily.     • Cholecalciferol (VITAMIN D) 2000 units capsule Take 4,000 Units by mouth Daily.     • coenzyme Q10 100 MG capsule Take 200 mg by mouth Every Night.     • levothyroxine (SYNTHROID) 150 MCG tablet Take 1 tablet by mouth daily.     • levothyroxine (SYNTHROID, LEVOTHROID) 112 MCG tablet Take 66 mcg by mouth 1 (One) Time Per Week.     • Mometasone Furoate (ASMANEX HFA IN) Inhale 2 (Two) Times a Day.     • olmesartan (BENICAR) 40 MG tablet Take 40 mg by mouth Daily.     • rosuvastatin (CRESTOR) 20 MG tablet TAKE 1 TABLET DAILY 90 tablet 1   • vitamin B-12 (CYANOCOBALAMIN) 500 MCG tablet Take 500 mcg by mouth Daily.     • [DISCONTINUED] CARTIA  MG 24 hr capsule TAKE 1 CAPSULE DAILY 90 capsule 2   • Albuterol (VENTOLIN IN) Inhale As Needed.     • aspirin 325 MG tablet Take 1 tablet by mouth daily.     • [DISCONTINUED] hydrochlorothiazide (MICROZIDE) 12.5 MG capsule Take 12.5 mg by mouth As Needed.       No current  "facility-administered medications on file prior to visit.        Social History     Socioeconomic History   • Marital status:      Spouse name: Not on file   • Number of children: Not on file   • Years of education: Not on file   • Highest education level: Not on file   Tobacco Use   • Smoking status: Never Smoker   • Smokeless tobacco: Never Used   • Tobacco comment: caffeine:    Substance and Sexual Activity   • Alcohol use: No   • Drug use: No   • Sexual activity: Defer           Review of Systems   Constitution: Positive for malaise/fatigue.   HENT: Negative for congestion.    Eyes: Negative for vision loss in left eye and vision loss in right eye.   Respiratory: Positive for shortness of breath. Negative for cough, hemoptysis, sleep disturbances due to breathing, snoring, sputum production and wheezing.    Endocrine: Negative.    Hematologic/Lymphatic: Negative.    Skin: Negative for poor wound healing and rash.   Musculoskeletal: Positive for joint pain. Negative for falls, gout, muscle cramps and myalgias.   Gastrointestinal: Negative for abdominal pain, diarrhea, dysphagia, hematemesis, melena, nausea and vomiting.   Neurological: Negative for excessive daytime sleepiness, dizziness, headaches, light-headedness, loss of balance, seizures and vertigo.   Psychiatric/Behavioral: Negative for depression and substance abuse. The patient is not nervous/anxious.        Procedures    ECG 12 Lead  Date/Time: 6/5/2019 11:44 AM  Performed by: Kelly Hodges MD  Authorized by: Kelly Hodges MD   Comparison: compared with previous ECG   Similar to previous ECG  Rhythm: sinus rhythm    Clinical impression: normal ECG                Objective:      Vitals:    06/05/19 1132   BP: 150/84   BP Location: Left arm   Pulse: 75   Weight: 104 kg (230 lb)   Height: 157.5 cm (62\")     Body mass index is 42.07 kg/m².    Physical Exam   Constitutional: She is oriented to person, place, and time. She appears " well-developed and well-nourished. No distress.   HENT:   Head: Normocephalic and atraumatic.   Eyes: Conjunctivae are normal. No scleral icterus.   Neck: Neck supple. No JVD present. Carotid bruit is not present. No thyromegaly present.   Cardiovascular: Normal rate, regular rhythm, S1 normal, S2 normal, normal heart sounds and intact distal pulses.  No extrasystoles are present. PMI is not displaced. Exam reveals no gallop.   No murmur heard.  Pulses:       Carotid pulses are 2+ on the right side, and 2+ on the left side.       Radial pulses are 2+ on the right side, and 2+ on the left side.        Dorsalis pedis pulses are 2+ on the right side, and 2+ on the left side.        Posterior tibial pulses are 2+ on the right side, and 2+ on the left side.   Pulmonary/Chest: Effort normal and breath sounds normal. No respiratory distress. She has no wheezes. She has no rhonchi. She has no rales. She exhibits no tenderness.   Abdominal: Soft. Bowel sounds are normal. She exhibits no distension, no abdominal bruit and no mass. There is no tenderness.   Musculoskeletal: She exhibits no edema or deformity.   Lymphadenopathy:     She has no cervical adenopathy.   Neurological: She is alert and oriented to person, place, and time. No cranial nerve deficit.   Skin: Skin is warm and dry. No rash noted. She is not diaphoretic. No cyanosis. No pallor. Nails show no clubbing.   Psychiatric: She has a normal mood and affect. Judgment normal.   Vitals reviewed.      Lab Review:       Assessment:      Diagnosis Plan   1. Benign essential hypertension  Stress Test With Myocardial Perfusion One Day   2. Hyperlipidemia, unspecified hyperlipidemia type  Stress Test With Myocardial Perfusion One Day   3. Paroxysmal atrial fibrillation (CMS/HCC)     4. Obstructive sleep apnea syndrome     5. Dyspnea on exertion  Stress Test With Myocardial Perfusion One Day     1. Hypertension, BP high today, increase diltiazem to 180mg daily.    2. Hyperlipidemia  3. Mild nonobstructive CAD  4. Dyspnea on exertion. Set up testing  5. Leg weakness, is this due to statin?     Plan:       See eusebia in 3 months.  Set up testing.

## 2019-06-12 ENCOUNTER — APPOINTMENT (OUTPATIENT)
Dept: CARDIOLOGY | Facility: HOSPITAL | Age: 69
End: 2019-06-12

## 2019-06-20 RX ORDER — DILTIAZEM HYDROCHLORIDE 180 MG/1
180 CAPSULE, COATED, EXTENDED RELEASE ORAL NIGHTLY
Qty: 90 CAPSULE | Refills: 1 | Status: SHIPPED | OUTPATIENT
Start: 2019-06-20 | End: 2019-09-05 | Stop reason: SDUPTHER

## 2019-06-21 ENCOUNTER — HOSPITAL ENCOUNTER (OUTPATIENT)
Dept: CARDIOLOGY | Facility: HOSPITAL | Age: 69
Discharge: HOME | End: 2019-06-21
Admitting: INTERNAL MEDICINE

## 2019-06-21 VITALS — BODY MASS INDEX: 39.27 KG/M2 | HEIGHT: 64 IN | WEIGHT: 230 LBS

## 2019-06-21 DIAGNOSIS — E78.5 HYPERLIPIDEMIA, UNSPECIFIED HYPERLIPIDEMIA TYPE: ICD-10-CM

## 2019-06-21 DIAGNOSIS — I10 BENIGN ESSENTIAL HYPERTENSION: ICD-10-CM

## 2019-06-21 DIAGNOSIS — R06.09 DYSPNEA ON EXERTION: ICD-10-CM

## 2019-06-21 LAB
BH CV NUCLEAR PRIOR STUDY: 3
BH CV STRESS BP STAGE 1: NORMAL
BH CV STRESS COMMENTS STAGE 1: NORMAL
BH CV STRESS DOSE REGADENOSON STAGE 1: 0.4
BH CV STRESS DURATION MIN STAGE 1: 0
BH CV STRESS DURATION SEC STAGE 1: 10
BH CV STRESS HR STAGE 1: 104
BH CV STRESS PROTOCOL 1: NORMAL
BH CV STRESS RECOVERY BP: NORMAL MMHG
BH CV STRESS RECOVERY HR: 90 BPM
BH CV STRESS STAGE 1: 1
LV EF NUC BP: 61 %
MAXIMAL PREDICTED HEART RATE: 151 BPM
PERCENT MAX PREDICTED HR: 68.87 %
STRESS BASELINE BP: NORMAL MMHG
STRESS BASELINE HR: 75 BPM
STRESS PERCENT HR: 81 %
STRESS POST EXERCISE DUR SEC: 10 SEC
STRESS POST PEAK BP: NORMAL MMHG
STRESS POST PEAK HR: 104 BPM
STRESS TARGET HR: 128 BPM

## 2019-06-21 PROCEDURE — 93016 CV STRESS TEST SUPVJ ONLY: CPT | Performed by: INTERNAL MEDICINE

## 2019-06-21 PROCEDURE — 0 TECHNETIUM TETROFOSMIN KIT: Performed by: INTERNAL MEDICINE

## 2019-06-21 PROCEDURE — 78452 HT MUSCLE IMAGE SPECT MULT: CPT | Performed by: INTERNAL MEDICINE

## 2019-06-21 PROCEDURE — 78452 HT MUSCLE IMAGE SPECT MULT: CPT

## 2019-06-21 PROCEDURE — 25010000002 REGADENOSON 0.4 MG/5ML SOLUTION: Performed by: INTERNAL MEDICINE

## 2019-06-21 PROCEDURE — A9502 TC99M TETROFOSMIN: HCPCS | Performed by: INTERNAL MEDICINE

## 2019-06-21 PROCEDURE — 93017 CV STRESS TEST TRACING ONLY: CPT

## 2019-06-21 PROCEDURE — 93018 CV STRESS TEST I&R ONLY: CPT | Performed by: INTERNAL MEDICINE

## 2019-06-21 RX ADMIN — TETROFOSMIN 1 DOSE: 1.38 INJECTION, POWDER, LYOPHILIZED, FOR SOLUTION INTRAVENOUS at 11:33

## 2019-06-21 RX ADMIN — TETROFOSMIN 1 DOSE: 1.38 INJECTION, POWDER, LYOPHILIZED, FOR SOLUTION INTRAVENOUS at 12:30

## 2019-06-21 RX ADMIN — REGADENOSON 0.4 MG: 0.08 INJECTION, SOLUTION INTRAVENOUS at 12:30

## 2019-08-19 ENCOUNTER — OFFICE VISIT (OUTPATIENT)
Dept: ORTHOPEDIC SURGERY | Facility: CLINIC | Age: 69
End: 2019-08-19

## 2019-08-19 VITALS — WEIGHT: 229.4 LBS | BODY MASS INDEX: 42.21 KG/M2 | HEIGHT: 62 IN

## 2019-08-19 DIAGNOSIS — M25.562 CHRONIC PAIN OF BOTH KNEES: Primary | ICD-10-CM

## 2019-08-19 DIAGNOSIS — Z00.00 HEALTHCARE MAINTENANCE: ICD-10-CM

## 2019-08-19 DIAGNOSIS — M25.561 CHRONIC PAIN OF BOTH KNEES: Primary | ICD-10-CM

## 2019-08-19 DIAGNOSIS — M17.10 PRIMARY LOCALIZED OSTEOARTHROSIS OF LOWER LEG, UNSPECIFIED LATERALITY: ICD-10-CM

## 2019-08-19 DIAGNOSIS — G89.29 CHRONIC PAIN OF BOTH KNEES: Primary | ICD-10-CM

## 2019-08-19 PROCEDURE — 73562 X-RAY EXAM OF KNEE 3: CPT | Performed by: ORTHOPAEDIC SURGERY

## 2019-08-19 PROCEDURE — 99203 OFFICE O/P NEW LOW 30 MIN: CPT | Performed by: ORTHOPAEDIC SURGERY

## 2019-08-19 PROCEDURE — 20610 DRAIN/INJ JOINT/BURSA W/O US: CPT | Performed by: ORTHOPAEDIC SURGERY

## 2019-08-19 RX ADMIN — METHYLPREDNISOLONE ACETATE 80 MG: 80 INJECTION, SUSPENSION INTRA-ARTICULAR; INTRALESIONAL; INTRAMUSCULAR; SOFT TISSUE at 13:59

## 2019-08-19 RX ADMIN — METHYLPREDNISOLONE ACETATE 80 MG: 80 INJECTION, SUSPENSION INTRA-ARTICULAR; INTRALESIONAL; INTRAMUSCULAR; SOFT TISSUE at 14:00

## 2019-08-19 NOTE — PROGRESS NOTES
New bilateral Knee      Patient: Liza Mcnair        YOB: 1950    Medical Record Number: [id number]        Chief Complaints: bilateral knee pain  Chief Complaint   Patient presents with   • Right Knee - Establish Care, Pain   • Left Knee - Establish Care, Pain     Bilateral knee pain      History of Present Illness: This is a 69-year-old female who have seen many many years ago presents complaining of bilateral knee pain right is worse than left she does have swelling she has limitation of her function she is caring for her  who is been ill.  Current symptoms are moderate intermittent stabbing aching worse with standing walking better with Tylenol she is retired past medical history smart for anemia thyroid disease and asthma        Allergies:   Allergies   Allergen Reactions   • Iodinated Diagnostic Agents Itching   • Iodine Itching   • Tartrazine Itching     Cat scan dye       Medications:   Home Medications:  Current Outpatient Medications on File Prior to Visit   Medication Sig   • Albuterol (VENTOLIN IN) Inhale As Needed.   • aspirin 325 MG tablet Take 1 tablet by mouth daily.   • aspirin 81 MG tablet Take 81 mg by mouth Daily.   • Cholecalciferol (VITAMIN D) 2000 units capsule Take 4,000 Units by mouth Daily.   • coenzyme Q10 100 MG capsule Take 200 mg by mouth Every Night.   • diltiaZEM CD (CARDIZEM CD) 180 MG 24 hr capsule Take 1 capsule by mouth Every Night.   • levothyroxine (SYNTHROID) 150 MCG tablet Take 1 tablet by mouth daily.   • levothyroxine (SYNTHROID, LEVOTHROID) 112 MCG tablet Take 66 mcg by mouth 1 (One) Time Per Week.   • Mometasone Furoate (ASMANEX HFA IN) Inhale 2 (Two) Times a Day.   • olmesartan (BENICAR) 40 MG tablet Take 40 mg by mouth Daily.   • rosuvastatin (CRESTOR) 20 MG tablet TAKE 1 TABLET DAILY   • vitamin B-12 (CYANOCOBALAMIN) 500 MCG tablet Take 500 mcg by mouth Daily.     No current facility-administered medications on file prior to visit.      Current  Medications:  Scheduled Meds:  Continuous Infusions:  No current facility-administered medications for this visit.   PRN Meds:.    Past Medical History:   Diagnosis Date   • Abnormal stress test     Stress echo on 8/21/14 showed hypokinesis of the mid anteroseptum and distal anterior wall.   • Allergy to IVP dye     Severe itching   • Atherosclerosis     Atherosclerosis of abdominal aorta   • Gastric ulcer    • GERD (gastroesophageal reflux disease)    • Hemangioma     Hemangioma of liver (right lobe)   • Hyperlipidemia    • Hypertension    • Hypothyroidism     History of Hashimoto's and goiter - s/p total thyroidectomy in 11/12   • Migraine    • Mitral valve insufficiency     Moderate to severe by RAUL on 9/4/14.  Mild to moderate by echo on 10/28/15.   • Non-rheumatic mitral regurgitation    • Non-rheumatic tricuspid valve insufficiency    • Osteopenia    • PAF (paroxysmal atrial fibrillation) (CMS/HCC)     Afib with RVR during stress echo on 08/21/14.  None on event recorder in 01/2015.   • Vitamin D deficiency disease         Past Surgical History:   Procedure Laterality Date   • CHOLECYSTECTOMY  1992   • EYE SURGERY      cataract surgery   • THYROID SURGERY  06/07/2012   • UTERINE FIBROID SURGERY      Several uterine ablation surgeries         Social History     Occupational History   • Not on file   Tobacco Use   • Smoking status: Never Smoker   • Smokeless tobacco: Never Used   • Tobacco comment: caffeine:    Substance and Sexual Activity   • Alcohol use: No   • Drug use: No   • Sexual activity: Defer    Social History     Social History Narrative   • Not on file        Family History   Problem Relation Age of Onset   • Goiter Mother    • Pancreatic cancer Mother    • Heart failure Father    • Hypertension Father    • Diabetes Sister    • Coronary artery disease Sister         Sister with 3 vessel CABG at age 62   • Heart disease Sister    • No Known Problems Maternal Grandmother    • No Known Problems  "Maternal Grandfather    • Diabetes Paternal Grandmother    • No Known Problems Paternal Grandfather              Review of Systems: 14 point review of systems are remarkable for the pertinent positives listed in the chart by the patient the remainder negative    Review of Systems      Physical Exam: 69 y.o. female  General Appearance:    Alert, cooperative, in no acute distress                 Vitals:    08/19/19 1357   Weight: 104 kg (229 lb 6.4 oz)   Height: 157.5 cm (62\")      Patient is alert and read ×3 no acute distress appears her above-listed at height weight and age.  Affect is normal respiratory rate is normal unlabored. Heart rate regular rate rhythm, sclera, dentition and hearing are normal for the purpose of this exam.        Ortho Exam  Physical exam of the right knee reveals no effusion, no erythema.  It mild loss of extension and full flexion  Patient has mild varus alignment.  They have mild tenderness to palpation about the medial compartment, no tenderness laterally..  The patient has a negative bounce home, negative Derik and a stable ligamentous exam.  Quad tone is reasonable and symmetric.  There are no overlying skin changes no lymphedema no lymphadenopathy.  There is good hip range of motion which is full symmetric and asymptomatic and a normal ankle exam.  Physical exam of the left knee reveals no effusion, no erythema.  It mild loss of extension and full flexion  Patient has mild varus alignment.  They have mild tenderness to palpation about the medial compartment, no tenderness laterally..  The patient has a negative bounce home, negative Derik and a stable ligamentous exam.  Quad tone is reasonable and symmetric.  There are no overlying skin changes no lymphedema no lymphadenopathy.  There is good hip range of motion which is full symmetric and asymptomatic and a normal ankle exam.      Large Joint Arthrocentesis: R knee  Date/Time: 8/19/2019 1:59 PM  Consent given by: patient  Site " marked: site marked  Timeout: Immediately prior to procedure a time out was called to verify the correct patient, procedure, equipment, support staff and site/side marked as required   Supporting Documentation  Indications: pain   Procedure Details  Location: knee - R knee  Preparation: Patient was prepped and draped in the usual sterile fashion  Needle size: 22 G  Approach: anteromedial  Medications administered: 80 mg methylPREDNISolone acetate 80 MG/ML; 4 mL lidocaine (cardiac)  Patient tolerance: patient tolerated the procedure well with no immediate complications    Large Joint Arthrocentesis: L knee  Date/Time: 8/19/2019 2:00 PM  Consent given by: patient  Site marked: site marked  Timeout: Immediately prior to procedure a time out was called to verify the correct patient, procedure, equipment, support staff and site/side marked as required   Supporting Documentation  Indications: pain and joint swelling   Procedure Details  Location: knee - L knee  Preparation: Patient was prepped and draped in the usual sterile fashion  Needle size: 22 G  Approach: anteromedial  Medications administered: 80 mg methylPREDNISolone acetate 80 MG/ML; 4 mL lidocaine (cardiac)  Aspirate: clear                     Radiology:   AP, Lateral and merchant views of the right and left have no compared to films she has severe medial compartment OA with near complete loss of joint space right is worse than left also moderate to severe patellofemoral OA knee  were ordered/reviewed to evauateknee pain.   Imaging Results (most recent)     Procedure Component Value Units Date/Time    XR Knee 3 View Bilateral [[id number]] Resulted:  08/19/19 1312     Updated:  08/19/19 1312    Impression:       Ordering physician's impression is located in the Encounter Note dated 08/19/19. X-ray performed in the DR room.          Assessment/Plan:      Bilateral knee pain which is degenerative in origin plan is to proceed with injections they can be crucial for  her to get continue with strengthening she has done physical therapy she is continue working on quad and core strengthening I will also have her see Dr. Suarez for discussion of the arthroplasty

## 2019-08-20 RX ORDER — METHYLPREDNISOLONE ACETATE 80 MG/ML
80 INJECTION, SUSPENSION INTRA-ARTICULAR; INTRALESIONAL; INTRAMUSCULAR; SOFT TISSUE
Status: COMPLETED | OUTPATIENT
Start: 2019-08-19 | End: 2019-08-19

## 2019-09-05 RX ORDER — DILTIAZEM HYDROCHLORIDE 180 MG/1
180 CAPSULE, COATED, EXTENDED RELEASE ORAL NIGHTLY
Qty: 90 CAPSULE | Refills: 1 | Status: SHIPPED | OUTPATIENT
Start: 2019-09-05 | End: 2020-02-17

## 2019-09-09 ENCOUNTER — OFFICE VISIT (OUTPATIENT)
Dept: CARDIOLOGY | Facility: CLINIC | Age: 69
End: 2019-09-09

## 2019-09-09 ENCOUNTER — TELEPHONE (OUTPATIENT)
Dept: CARDIOLOGY | Facility: CLINIC | Age: 69
End: 2019-09-09

## 2019-09-09 VITALS
BODY MASS INDEX: 42.51 KG/M2 | HEIGHT: 62 IN | DIASTOLIC BLOOD PRESSURE: 80 MMHG | WEIGHT: 231 LBS | SYSTOLIC BLOOD PRESSURE: 140 MMHG

## 2019-09-09 DIAGNOSIS — E78.2 MIXED HYPERLIPIDEMIA: ICD-10-CM

## 2019-09-09 DIAGNOSIS — I10 ESSENTIAL HYPERTENSION: Primary | ICD-10-CM

## 2019-09-09 DIAGNOSIS — Z63.8 CAREGIVER ROLE STRAIN: ICD-10-CM

## 2019-09-09 DIAGNOSIS — I48.0 PAROXYSMAL ATRIAL FIBRILLATION (HCC): ICD-10-CM

## 2019-09-09 PROCEDURE — 99214 OFFICE O/P EST MOD 30 MIN: CPT | Performed by: NURSE PRACTITIONER

## 2019-09-09 PROCEDURE — 93000 ELECTROCARDIOGRAM COMPLETE: CPT | Performed by: NURSE PRACTITIONER

## 2019-09-09 SDOH — SOCIAL STABILITY - SOCIAL INSECURITY: OTHER SPECIFIED PROBLEMS RELATED TO PRIMARY SUPPORT GROUP: Z63.8

## 2019-09-09 NOTE — PROGRESS NOTES
Date of Office Visit: 2019  Encounter Provider: MIMI Shelton  Place of Service: [facility] CARDIOLOGY  Patient Name: Liza Mcnair  :1950      Chief Complaint   Patient presents with   • Hypertension   • Follow-up   :     Dear Dr. Das,     HPI: Liza Mcnair is a pleasant 69 y.o. female who presents today for cardiac follow up. She is a new patient to me and her previous records have been reviewed.     In 2014, she was diagnosed with mild nonobstructive coronary artery disease per cardiac catheterization.  She had atrial fibrillation induced on a stress study which never recurred and was noted to have a normal event recorder in 2015.  She had a history of hypertension, hyperlipidemia, hypothyroidism, and obesity.  In 2018 she had an echocardiogram which revealed an EF of 63%, grade 1 diastolic dysfunction, and normal valvular function.    In 2019, she followed up with Dr. Hodges.  She reported that she had stopped HCTZ because it made her feel funny.  Her blood pressure has been more elevated at home systolic 150s to 170s.  She reported new onset exertional dyspnea and decreased stamina.  A nuclear stress test was completed 2019 which showed a very small, mild reversible inferior apical defect noted and a small amount of ischemia could not be excluded.  Dr. Hodges reviewed and felt that it was a low risk study and recommended better blood pressure control.  Her diltiazem was increased to 180 mg daily.    She presents today for her follow-up visit.  She denies any further shortness of breath.  She continues to have fatigue and decreased stamina which may be from caregiver burden.  She does not report that taking care of her  who is on dialysis as of burden, but she is often on the go when he is not on dialysis and is having to take care of the house, cleaning, errands, and cooking.  She says she is very  tired.  She denies chest pain, PND, orthopnea, cough, edema, palpitations, dizziness, or syncope.  She says her blood pressure is elevated today, but at home is running 130s over 70s.  She really likes to participate in water therapy, but has not been able to do so because she does not have enough time taking care of her .    ADDENDUM 9/11/2019: I requested her blood work from her PCP office notes dated 1/2/2019.  Lipid panel-total cholesterol 173, triglycerides 55, HDL 85, and LDL 74.  CMP normal.  TSH 2.69 normal.  Hemoglobin and hematocrit normal.    Past Medical History:   Diagnosis Date   • Abnormal stress test     Stress echo on 8/21/14 showed hypokinesis of the mid anteroseptum and distal anterior wall.   • Allergy to IVP dye     Severe itching   • Atherosclerosis     Atherosclerosis of abdominal aorta   • CAD (coronary artery disease) 09/2014    Mild, nonobstructive   • Gastric ulcer    • GERD (gastroesophageal reflux disease)    • Hemangioma     Hemangioma of liver (right lobe)   • Hyperlipidemia    • Hypertension    • Hypothyroidism     History of Hashimoto's and goiter - s/p total thyroidectomy in 11/12   • Migraine    • Mitral valve insufficiency     Moderate to severe by RAUL on 9/4/14.  Mild to moderate by echo on 10/28/15.   • Non-rheumatic tricuspid valve insufficiency    • Obstructive sleep apnea syndrome    • Osteopenia    • PAF (paroxysmal atrial fibrillation) (CMS/HCC)     Afib with RVR during stress echo on 08/21/14.  None on event recorder in 01/2015.   • Vitamin D deficiency disease        Past Surgical History:   Procedure Laterality Date   • CHOLECYSTECTOMY  1992   • EYE SURGERY      cataract surgery   • THYROID SURGERY  06/07/2012   • UTERINE FIBROID SURGERY      Several uterine ablation surgeries        Social History     Socioeconomic History   • Marital status:      Spouse name: Not on file   • Number of children: Not on file   • Years of education: Not on file   • Highest  education level: Not on file   Tobacco Use   • Smoking status: Never Smoker   • Smokeless tobacco: Never Used   • Tobacco comment: caffeine:    Substance and Sexual Activity   • Alcohol use: No     Comment: Caffeine use   • Drug use: No   • Sexual activity: Defer       Family History   Problem Relation Age of Onset   • Goiter Mother    • Pancreatic cancer Mother    • Heart failure Father    • Hypertension Father    • Diabetes Sister    • Coronary artery disease Sister         Sister with 3 vessel CABG at age 62   • Heart disease Sister    • No Known Problems Maternal Grandmother    • No Known Problems Maternal Grandfather    • Diabetes Paternal Grandmother    • No Known Problems Paternal Grandfather        The following portion of the patient's history were reviewed and updated as appropriate: past medical history, past surgical history, past social history, past family history, allergies, current medications, and problem list.    Review of Systems   Constitution: Positive for malaise/fatigue. Negative for chills, diaphoresis, fever, night sweats, weight gain and weight loss.   HENT: Negative for hearing loss, nosebleeds, sore throat and tinnitus.    Eyes: Negative for blurred vision, double vision, pain and visual disturbance.   Cardiovascular: Positive for dyspnea on exertion. Negative for chest pain, claudication, cyanosis, irregular heartbeat, leg swelling, near-syncope, orthopnea, palpitations, paroxysmal nocturnal dyspnea and syncope.   Respiratory: Positive for shortness of breath. Negative for cough, hemoptysis, snoring and wheezing.    Endocrine: Negative for cold intolerance, heat intolerance and polyuria.   Hematologic/Lymphatic: Negative for bleeding problem. Does not bruise/bleed easily.   Skin: Negative for color change, dry skin, flushing and itching.   Musculoskeletal: Positive for joint swelling. Negative for falls, joint pain, muscle cramps, muscle weakness and myalgias.   Gastrointestinal:  "Negative for abdominal pain, constipation, heartburn, melena, nausea and vomiting.   Genitourinary: Negative for dysuria and hematuria.   Neurological: Positive for excessive daytime sleepiness. Negative for dizziness, light-headedness, loss of balance, numbness, paresthesias, seizures and vertigo.   Psychiatric/Behavioral: Negative for altered mental status, depression, memory loss and substance abuse. The patient does not have insomnia and is not nervous/anxious.    Allergic/Immunologic: Negative for environmental allergies.       Allergies   Allergen Reactions   • Iodinated Diagnostic Agents Itching   • Iodine Itching   • Tartrazine Itching     Cat scan dye         Current Outpatient Medications:   •  Albuterol (VENTOLIN IN), Inhale As Needed., Disp: , Rfl:   •  aspirin 81 MG tablet, Take 81 mg by mouth Daily., Disp: , Rfl:   •  Cholecalciferol (VITAMIN D) 2000 units capsule, Take 4,000 Units by mouth Daily., Disp: , Rfl:   •  coenzyme Q10 100 MG capsule, Take 200 mg by mouth Every Night., Disp: , Rfl:   •  diltiaZEM CD (CARDIZEM CD) 180 MG 24 hr capsule, Take 1 capsule by mouth Every Night., Disp: 90 capsule, Rfl: 1  •  levothyroxine (SYNTHROID) 150 MCG tablet, Take 1 tablet by mouth daily., Disp: , Rfl:   •  Mometasone Furoate (ASMANEX HFA IN), Inhale 2 (Two) Times a Day., Disp: , Rfl:   •  olmesartan (BENICAR) 40 MG tablet, Take 40 mg by mouth Daily., Disp: , Rfl:   •  rosuvastatin (CRESTOR) 20 MG tablet, TAKE 1 TABLET DAILY, Disp: 90 tablet, Rfl: 1  •  vitamin B-12 (CYANOCOBALAMIN) 500 MCG tablet, Take 500 mcg by mouth Daily., Disp: , Rfl:         Objective:     Vitals:    09/09/19 1108   BP: 140/80   BP Location: Left arm   Weight: 105 kg (231 lb)   Height: 157.5 cm (62\")     Body mass index is 42.25 kg/m².    PHYSICAL EXAM:    Vitals Reviewed.   General Appearance: No acute distress, well developed and well nourished. Obese.   Eyes: Conjunctiva and lids: No erythema, swelling, or discharge. Sclera " non-icteric.   HENT: Atraumatic, normocephalic. External eyes, ears, and nose normal. No hearing loss noted. Mucous membranes normal. Lips not cyanotic. Neck supple with no tenderness.  Respiratory: No signs of respiratory distress. Respiration rhythm and depth normal.   Clear to auscultation. No rales, crackles, rhonchi, or wheezing auscultated.   Cardiovascular:  Jugular Venous Pressure: Normal  Heart Rate and Rhythm: Normal, Heart Sounds: Normal S1 and S2. No S3 or S4 noted.  Murmurs: No murmurs noted. No rubs, thrills, or gallops.   Lower Extremities: No edema noted.  Gastrointestinal:  Abdomen soft, non-distended, non-tender. Normal bowel sounds. No hepatomegaly.   Musculoskeletal: Normal movement of extremities  Skin and Nails: General appearance normal. No pallor, cyanosis, diaphoresis. Skin temperature normal. No clubbing of fingernails.   Psychiatric: Patient alert and oriented to person, place, and time. Speech and behavior appropriate. Normal mood and affect.       ECG 12 Lead  Date/Time: 9/9/2019 11:11 AM  Performed by: Lanette Sellers APRN  Authorized by: Lanette Sellers APRN   Comparison: compared with previous ECG from 6/5/2019  Similar to previous ECG  Rhythm: sinus rhythm  Rate: normal  BPM: 78  Conduction: conduction normal  ST Segments: ST segments normal  T Waves: T waves normal  QRS axis: normal    Clinical impression: normal ECG              Assessment:       Diagnosis Plan   1. Essential hypertension     2. Mixed hyperlipidemia     3. Body mass index (BMI) of 40.0-44.9 in adult (CMS/HCC)     4. Paroxysmal atrial fibrillation (CMS/HCC)     5. Caregiver role strain            Plan:       1.  Hypertension: Blood pressure is elevated today.  She says is been much better controlled at home 130s over 70s.  I recommended that she restart her water therapy, exercise, low-sodium diet, and weight loss, and watch blood pressures closely.    2.  Hyperlipidemia: Remains on rosuvastatin.    3.  Obesity:  BMI is 42.2.  She would benefit from weight loss and exercise.    4.  Paroxysmal Atrial Fibrillation: Remote history of atrial fibrillation during a stress test in 2014.  No recurrence.    Atrial Fibrillation and Atrial Flutter  Assessment  • The patient has paroxysmal atrial fibrillation  • The patient's CHADS2-VASc score is 3  • A [id number] score of 2 or more is considered a high risk for a thromboembolic event  • No recurrence-she is not on anticoagulation    Plan  • Attempt to maintain sinus rhythm  • Continue diltiazem for rate control      5.  Caregiver Strain: She states that she is very fatigued and is often on the go taking care of her  who is on dialysis.  I have explained the importance of taking good care of herself and giving herself time for hobbies and water exercise which she enjoys.    6.  I recommend follow-up with Dr. Hodges in 6 months, unless otherwise needed sooner.    As always, it has been a pleasure to participate in your patient's care. Thank you.       Sincerely,         MIMI Pineda        **Dragon Disclaimer:**  Much of this encounter note is an electronic transcription/translation of spoken language to printed text. The electronic translation of spoken language may permit erroneous, or at times, nonsensical words or phrases to be inadvertently transcribed. Although I have reviewed the note for such errors, some may still exist.

## 2019-09-09 NOTE — TELEPHONE ENCOUNTER
----- Message from MIMI Kyle sent at 9/9/2019  4:31 PM EDT -----    Please obtain last CBC, CMP/BMP, Lipid panel from PCP. Thanks.

## 2019-10-25 ENCOUNTER — CONSULT (OUTPATIENT)
Dept: ORTHOPEDIC SURGERY | Facility: CLINIC | Age: 69
End: 2019-10-25

## 2019-10-25 VITALS — TEMPERATURE: 98.6 F | WEIGHT: 231 LBS | BODY MASS INDEX: 42.51 KG/M2 | HEIGHT: 62 IN

## 2019-10-25 DIAGNOSIS — M17.0 PRIMARY OSTEOARTHRITIS OF BOTH KNEES: Primary | ICD-10-CM

## 2019-10-25 PROCEDURE — 99214 OFFICE O/P EST MOD 30 MIN: CPT | Performed by: ORTHOPAEDIC SURGERY

## 2019-10-25 NOTE — PROGRESS NOTES
Patient: Liza Mcnair  YOB: 1950 69 y.o. female  Medical Record Number: [id number]    Chief Complaints:   Chief Complaint   Patient presents with   • Left Knee - Establish Care   • Right Knee - Establish Care       History of Present Illness:Liza Mcnair is a 69 y.o. female who presents with R > L knee pain and stiffness.  Stiffness is the biggest problem.  She has done injections 24 therapy visits anti-inflammatories without relief of symptoms.  She takes care of her  who is very medically frail.  Her knees are limiting her activities from a stiffness standpoint.    Allergies:   Allergies   Allergen Reactions   • Iodinated Diagnostic Agents Itching   • Iodine Itching   • Tartrazine Itching     Cat scan dye       Medications:   Current Outpatient Medications   Medication Sig Dispense Refill   • Albuterol (VENTOLIN IN) Inhale As Needed.     • aspirin 81 MG tablet Take 81 mg by mouth Daily.     • Cholecalciferol (VITAMIN D) 2000 units capsule Take 4,000 Units by mouth Daily.     • coenzyme Q10 100 MG capsule Take 200 mg by mouth Every Night.     • diltiaZEM CD (CARDIZEM CD) 180 MG 24 hr capsule Take 1 capsule by mouth Every Night. 90 capsule 1   • levothyroxine (SYNTHROID) 150 MCG tablet Take 1 tablet by mouth daily.     • Mometasone Furoate (ASMANEX HFA IN) Inhale 2 (Two) Times a Day.     • olmesartan (BENICAR) 40 MG tablet Take 40 mg by mouth Daily.     • rosuvastatin (CRESTOR) 20 MG tablet TAKE 1 TABLET DAILY 90 tablet 1   • vitamin B-12 (CYANOCOBALAMIN) 500 MCG tablet Take 500 mcg by mouth Daily.       No current facility-administered medications for this visit.          The following portions of the patient's history were reviewed and updated as appropriate: allergies, current medications, past family history, past medical history, past social history, past surgical history and problem list.    Review of Systems:   A 14 point review of systems was performed. All systems negative except  "pertinent positives/negative listed in HPI above    Physical Exam:   Vitals:    10/25/19 1311   Temp: 98.6 °F (37 °C)   TempSrc: Temporal   Weight: 105 kg (231 lb)   Height: 157.5 cm (62\")   PainSc:   6   PainLoc: Knee       General: A and O x 3, ASA, NAD    SCLERA:    Normal    DENTITION:   Normal   Knee:  left    ALIGNMENT:     Varus  ,   Patella  tracks  midline    GAIT:    Antalgic    SKIN:    No abnormality    RANGE OF MOTION:   3  -  118   DEG    STRENGTH:   4  / 5    LIGAMENTS:    No varus / valgus instability.   Negative  Lachman.    MENISCUS:     Negative   Derik       DISTAL PULSES:    Paplable    DISTAL SENSATION :   Intact    LYMPHATICS:     No   lymphadenopathy    OTHER:          - Positive   effusion      - Crepitance with ROM  Knee:  right    ALIGNMENT:     Varus  ,   Patella  tracks  midline    GAIT:    Antalgic    SKIN:    No abnormality    RANGE OF MOTION:   10  -  105   DEG    STRENGTH:   4  / 5    LIGAMENTS:    No varus / valgus instability.   Negative  Lachman.    MENISCUS:     Negative   Derik       DISTAL PULSES:    Paplable    DISTAL SENSATION :   Intact    LYMPHATICS:     No   lymphadenopathy    OTHER:          - Positive   effusion      - Crepitance with ROM            Radiology:  Xrays 3views both knees  (ap,lateral, sunrise) taken previously demonstrating advanced varus osteoarthritis with bone on bone articulation, subchondral cysts, and periarticular osteophytes    Assessment/Plan: R> L knee endstage OA-  Has failed conservative measures -  Next stpe is R TKA-  She takes care of her  who is very frail and requires help so she is not ready to proceed with surgery. She will call when ready.  A total of  25 minutes was spent face to face with >13 minutes being spent in counseling and discussion of joint replacement surgery.  We discussed the risks benefits and alternatives of the procedure.  I explained the proposed surgical procedure in detail and also discussed the expected " hospital course, discharge course and postoperative treatment plan in detail.      Bharat Suarez MD  10/25/2019

## 2020-02-07 ENCOUNTER — TELEPHONE (OUTPATIENT)
Dept: INTERNAL MEDICINE | Facility: CLINIC | Age: 70
End: 2020-02-07

## 2020-02-07 NOTE — TELEPHONE ENCOUNTER
FYI-Called to reschedule patient for next available, as PCP will be out of the office the date of appointment. If patient calls back please reschedule for next available, if unable to accomodate patient please send message incase they can be worked in, thank you.

## 2020-02-17 RX ORDER — DILTIAZEM HYDROCHLORIDE 180 MG/1
CAPSULE, EXTENDED RELEASE ORAL
Qty: 90 CAPSULE | Refills: 1 | Status: SHIPPED | OUTPATIENT
Start: 2020-02-17 | End: 2020-05-01

## 2020-03-02 ENCOUNTER — TELEPHONE (OUTPATIENT)
Dept: INTERNAL MEDICINE | Facility: CLINIC | Age: 70
End: 2020-03-02

## 2020-03-02 NOTE — TELEPHONE ENCOUNTER
PT CALLED AND IS ASKING FOR A CALL BACK FROM JAC REGARDING AN APPOINTMENT THAT WAS RESCHEDULED FOR September. PATIENT HAS BEEN TRYING TO SEE PROVIDER BUT KEEPS GETTING RESCHEDULED. SHE STATES THAT JAC KNOW WHAT THE SITUATION IS, WHY HER APPOINTMENTS KEEP GETTING CANCELLED.     C/B

## 2020-05-01 ENCOUNTER — OFFICE VISIT (OUTPATIENT)
Dept: CARDIOLOGY | Facility: CLINIC | Age: 70
End: 2020-05-01

## 2020-05-01 VITALS
BODY MASS INDEX: 42.69 KG/M2 | WEIGHT: 232 LBS | DIASTOLIC BLOOD PRESSURE: 81 MMHG | HEART RATE: 96 BPM | HEIGHT: 62 IN | SYSTOLIC BLOOD PRESSURE: 141 MMHG

## 2020-05-01 DIAGNOSIS — E78.2 MIXED HYPERLIPIDEMIA: ICD-10-CM

## 2020-05-01 DIAGNOSIS — I48.0 PAROXYSMAL ATRIAL FIBRILLATION (HCC): Primary | ICD-10-CM

## 2020-05-01 DIAGNOSIS — G47.33 OBSTRUCTIVE SLEEP APNEA SYNDROME: ICD-10-CM

## 2020-05-01 DIAGNOSIS — I10 ESSENTIAL HYPERTENSION: ICD-10-CM

## 2020-05-01 PROCEDURE — 99213 OFFICE O/P EST LOW 20 MIN: CPT | Performed by: INTERNAL MEDICINE

## 2020-05-01 RX ORDER — DILTIAZEM HYDROCHLORIDE 240 MG/1
240 CAPSULE, COATED, EXTENDED RELEASE ORAL DAILY
Qty: 90 CAPSULE | Refills: 3 | Status: SHIPPED | OUTPATIENT
Start: 2020-05-01 | End: 2020-12-23

## 2020-05-01 NOTE — PROGRESS NOTES
Date of Office Visit: 2020  Encounter Provider: Kelly Hodges MD  Place of Service: [facility] CARDIOLOGY  Patient Name: Liza Mcnair  :1950      Patient ID:  Liza Mcnair is a 69 y.o. female is here for  followup for mild CAD, hypertension, PAF.         History of Present Illness       She has a history of mild nonobstructive coronary artery disease by cardiac catheterization done 2014.  She had atrial fibrillation induced on a stress study which has never recurred and had a normal event recorder done 2015.  She has hypertension and is on diltiazem, olmesartan.  She has hyperlipidemia for which she takes rosuvastatin.  She has hypothyroidism and is on levothyroxine.  She does have osteoarthritis. She is the primary caregiver at home as her , Aguilar, is dialysis dependent.  She went off of hydrochlorothiazide because it made her feel funny.  She sees Dr. Cooley for asthma.     She had echocardiogram done 2018 showing ejection fraction 63% with normal valves and grade 1 diastolic dysfunction.  She has been noticing exertional dyspnea and decreased stamina with activity.  stress nuclear study done 2019 showed a small inferoapical defect.        She does not smoke or use alcohol.     She has noticed more heart racing.  She has mild dizziness.  She has fatigue.  She has had no syncope. She has no chest pain.  She has no dyspnea. She has no orthopnea or PND.  She has had no f/c/cough/n/v/d.  She is taking her medications as directed.  Her heart rate at rest is 70 and with little activity increases to  and she can feel this. She thinks that it is atrial fibrillation.     Past Medical History:   Diagnosis Date   • Abnormal stress test     Stress echo on 14 showed hypokinesis of the mid anteroseptum and distal anterior wall.   • Allergy to IVP dye     Severe itching   • Atherosclerosis     Atherosclerosis of abdominal aorta   • CAD (coronary  artery disease) 09/2014    Mild, nonobstructive   • Gastric ulcer    • GERD (gastroesophageal reflux disease)    • Hemangioma     Hemangioma of liver (right lobe)   • Hyperlipidemia    • Hypertension    • Hypothyroidism     History of Hashimoto's and goiter - s/p total thyroidectomy in 11/12   • Migraine    • Mitral valve insufficiency     Moderate to severe by RAUL on 9/4/14.  Mild to moderate by echo on 10/28/15.   • Non-rheumatic tricuspid valve insufficiency    • Obstructive sleep apnea syndrome    • Osteopenia    • PAF (paroxysmal atrial fibrillation) (CMS/HCC)     Afib with RVR during stress echo on 08/21/14.  None on event recorder in 01/2015.   • Vitamin D deficiency disease          Past Surgical History:   Procedure Laterality Date   • CHOLECYSTECTOMY  1992   • EYE SURGERY      cataract surgery   • THYROID SURGERY  06/07/2012   • UTERINE FIBROID SURGERY      Several uterine ablation surgeries        Current Outpatient Medications on File Prior to Visit   Medication Sig Dispense Refill   • Albuterol (VENTOLIN IN) Inhale As Needed.     • aspirin 81 MG tablet Take 324 mg by mouth Daily.     • Cholecalciferol (VITAMIN D) 2000 units capsule Take 4,000 Units by mouth Daily.     • coenzyme Q10 100 MG capsule Take 200 mg by mouth Every Night.     • levothyroxine (SYNTHROID) 150 MCG tablet Take 1 tablet by mouth Daily. Pt take 206 mcg on the Sunday     • Mometasone Furoate (ASMANEX HFA IN) Inhale 2 (Two) Times a Day.     • olmesartan (BENICAR) 40 MG tablet Take 40 mg by mouth Daily.     • rosuvastatin (CRESTOR) 20 MG tablet TAKE 1 TABLET DAILY 90 tablet 1   • vitamin B-12 (CYANOCOBALAMIN) 500 MCG tablet Take 500 mcg by mouth Daily.     • [DISCONTINUED] CARTIA  MG 24 hr capsule TAKE 1 CAPSULE EVERY NIGHT 90 capsule 1     No current facility-administered medications on file prior to visit.        Social History     Socioeconomic History   • Marital status:      Spouse name: Not on file   • Number of  "children: Not on file   • Years of education: Not on file   • Highest education level: Not on file   Tobacco Use   • Smoking status: Never Smoker   • Smokeless tobacco: Never Used   • Tobacco comment: caffeine:    Substance and Sexual Activity   • Alcohol use: No     Comment: Caffeine use   • Drug use: No   • Sexual activity: Defer           ROS    Procedures  Procedures        Objective:      Vitals:    05/01/20 1046   BP: 141/81   Pulse: 96   Weight: 105 kg (232 lb)   Height: 157.5 cm (62\")     Body mass index is 42.43 kg/m².    Physical Exam    Telephone visit due to covid-19    Lab Review:       Assessment:      Diagnosis Plan   1. Paroxysmal atrial fibrillation (CMS/HCC)  Holter Monitor - 72 Hour Up To 21 Days   2. Essential hypertension     3. Mixed hyperlipidemia     4. Obstructive sleep apnea syndrome  Holter Monitor - 72 Hour Up To 21 Days     1. Hypertension, BP goal <120/80.   2. Hyperlipidemia, on rosuvastatin.   3. Mild nonobstructive CAD, no angina.   4. Dyspnea on exertion. Very mild inferoapical defect, treated medically.   5. H/o PAF, feels like she is having more of this.      Plan:       Set up 2 week monitor.  Increase cartia xt to 240mg daily due to HTN and elevated heart rate. She is not on AC but may need if she is having more a fib.       This patient has consented to a telehealth visit via telephone. The visit was scheduled as a telephone visit to comply with patient safety concerns in accordance with CDC recommendations.  All vitals recorded within this visit are reported by the patient.  I spent  25 minutes in total including but not limited to the 15 minutes spent in direct conversation with this patient.   "

## 2020-05-18 ENCOUNTER — TELEPHONE (OUTPATIENT)
Dept: CARDIOLOGY | Facility: CLINIC | Age: 70
End: 2020-05-18

## 2020-05-18 NOTE — TELEPHONE ENCOUNTER
Patient wore the monitor for approx. 4 days before the skin irritation began.  She will return the kit and wait for results

## 2020-05-18 NOTE — TELEPHONE ENCOUNTER
Pt called and states that she took her heart monitor off due to skin irritation and itchy. She have a question. She can be reach at [phone].        Thanks  Nhi

## 2020-12-23 ENCOUNTER — OFFICE VISIT (OUTPATIENT)
Dept: CARDIOLOGY | Facility: CLINIC | Age: 70
End: 2020-12-23

## 2020-12-23 ENCOUNTER — TELEPHONE (OUTPATIENT)
Dept: CARDIOLOGY | Facility: CLINIC | Age: 70
End: 2020-12-23

## 2020-12-23 ENCOUNTER — HOSPITAL ENCOUNTER (OUTPATIENT)
Dept: CARDIOLOGY | Facility: HOSPITAL | Age: 70
Discharge: HOME | End: 2020-12-23
Admitting: INTERNAL MEDICINE

## 2020-12-23 VITALS
WEIGHT: 233.4 LBS | BODY MASS INDEX: 42.95 KG/M2 | SYSTOLIC BLOOD PRESSURE: 144 MMHG | HEIGHT: 62 IN | DIASTOLIC BLOOD PRESSURE: 70 MMHG | HEART RATE: 77 BPM

## 2020-12-23 DIAGNOSIS — I48.0 PAROXYSMAL ATRIAL FIBRILLATION (HCC): ICD-10-CM

## 2020-12-23 DIAGNOSIS — I10 ESSENTIAL HYPERTENSION: ICD-10-CM

## 2020-12-23 DIAGNOSIS — G47.33 OBSTRUCTIVE SLEEP APNEA SYNDROME: ICD-10-CM

## 2020-12-23 DIAGNOSIS — E78.2 MIXED HYPERLIPIDEMIA: ICD-10-CM

## 2020-12-23 DIAGNOSIS — I48.0 PAROXYSMAL ATRIAL FIBRILLATION (HCC): Primary | ICD-10-CM

## 2020-12-23 LAB
ALBUMIN SERPL-MCNC: 4.5 G/DL (ref 3.5–5.2)
ALBUMIN/GLOB SERPL: 1.7 G/DL
ALP SERPL-CCNC: 79 U/L (ref 39–117)
ALT SERPL W P-5'-P-CCNC: 21 U/L (ref 1–33)
ANION GAP SERPL CALCULATED.3IONS-SCNC: 11.9 MMOL/L (ref 5–15)
AST SERPL-CCNC: 18 U/L (ref 1–32)
BASOPHILS # BLD AUTO: 0.04 10*3/MM3 (ref 0–0.2)
BASOPHILS NFR BLD AUTO: 0.7 % (ref 0–1.5)
BILIRUB SERPL-MCNC: 0.7 MG/DL (ref 0–1.2)
BUN SERPL-MCNC: 14 MG/DL (ref 8–23)
BUN/CREAT SERPL: 22.2 (ref 7–25)
CALCIUM SPEC-SCNC: 9.3 MG/DL (ref 8.6–10.5)
CHLORIDE SERPL-SCNC: 106 MMOL/L (ref 98–107)
CHOLEST SERPL-MCNC: 174 MG/DL (ref 0–200)
CO2 SERPL-SCNC: 23.1 MMOL/L (ref 22–29)
CREAT SERPL-MCNC: 0.63 MG/DL (ref 0.57–1)
DEPRECATED RDW RBC AUTO: 40.7 FL (ref 37–54)
EOSINOPHIL # BLD AUTO: 0.09 10*3/MM3 (ref 0–0.4)
EOSINOPHIL NFR BLD AUTO: 1.5 % (ref 0.3–6.2)
ERYTHROCYTE [DISTWIDTH] IN BLOOD BY AUTOMATED COUNT: 12 % (ref 12.3–15.4)
GFR SERPL CREATININE-BSD FRML MDRD: 93 ML/MIN/1.73
GLOBULIN UR ELPH-MCNC: 2.7 GM/DL
GLUCOSE SERPL-MCNC: 94 MG/DL (ref 65–99)
HCT VFR BLD AUTO: 40.6 % (ref 34–46.6)
HDLC SERPL-MCNC: 84 MG/DL (ref 40–60)
HGB BLD-MCNC: 13.6 G/DL (ref 12–15.9)
IMM GRANULOCYTES # BLD AUTO: 0.02 10*3/MM3 (ref 0–0.05)
IMM GRANULOCYTES NFR BLD AUTO: 0.3 % (ref 0–0.5)
LDLC SERPL CALC-MCNC: 77 MG/DL (ref 0–100)
LDLC/HDLC SERPL: 0.91 {RATIO}
LYMPHOCYTES # BLD AUTO: 1.81 10*3/MM3 (ref 0.7–3.1)
LYMPHOCYTES NFR BLD AUTO: 30.9 % (ref 19.6–45.3)
MAGNESIUM SERPL-MCNC: 2.2 MG/DL (ref 1.6–2.4)
MCH RBC QN AUTO: 30.9 PG (ref 26.6–33)
MCHC RBC AUTO-ENTMCNC: 33.5 G/DL (ref 31.5–35.7)
MCV RBC AUTO: 92.3 FL (ref 79–97)
MONOCYTES # BLD AUTO: 0.45 10*3/MM3 (ref 0.1–0.9)
MONOCYTES NFR BLD AUTO: 7.7 % (ref 5–12)
NEUTROPHILS NFR BLD AUTO: 3.45 10*3/MM3 (ref 1.7–7)
NEUTROPHILS NFR BLD AUTO: 58.9 % (ref 42.7–76)
NRBC BLD AUTO-RTO: 0 /100 WBC (ref 0–0.2)
PLATELET # BLD AUTO: 242 10*3/MM3 (ref 140–450)
PMV BLD AUTO: 11 FL (ref 6–12)
POTASSIUM SERPL-SCNC: 3.8 MMOL/L (ref 3.5–5.2)
PROT SERPL-MCNC: 7.2 G/DL (ref 6–8.5)
RBC # BLD AUTO: 4.4 10*6/MM3 (ref 3.77–5.28)
SODIUM SERPL-SCNC: 141 MMOL/L (ref 136–145)
TRIGL SERPL-MCNC: 66 MG/DL (ref 0–150)
TSH SERPL DL<=0.05 MIU/L-ACNC: 8.03 UIU/ML (ref 0.27–4.2)
URATE SERPL-MCNC: 4.1 MG/DL (ref 2.4–5.7)
VLDLC SERPL-MCNC: 13 MG/DL (ref 5–40)
WBC # BLD AUTO: 5.86 10*3/MM3 (ref 3.4–10.8)

## 2020-12-23 PROCEDURE — 84550 ASSAY OF BLOOD/URIC ACID: CPT | Performed by: INTERNAL MEDICINE

## 2020-12-23 PROCEDURE — 99214 OFFICE O/P EST MOD 30 MIN: CPT | Performed by: INTERNAL MEDICINE

## 2020-12-23 PROCEDURE — 36415 COLL VENOUS BLD VENIPUNCTURE: CPT

## 2020-12-23 PROCEDURE — 80053 COMPREHEN METABOLIC PANEL: CPT | Performed by: INTERNAL MEDICINE

## 2020-12-23 PROCEDURE — 84443 ASSAY THYROID STIM HORMONE: CPT | Performed by: INTERNAL MEDICINE

## 2020-12-23 PROCEDURE — 80061 LIPID PANEL: CPT | Performed by: INTERNAL MEDICINE

## 2020-12-23 PROCEDURE — 83735 ASSAY OF MAGNESIUM: CPT | Performed by: INTERNAL MEDICINE

## 2020-12-23 PROCEDURE — 93000 ELECTROCARDIOGRAM COMPLETE: CPT | Performed by: INTERNAL MEDICINE

## 2020-12-23 PROCEDURE — 85025 COMPLETE CBC W/AUTO DIFF WBC: CPT | Performed by: INTERNAL MEDICINE

## 2020-12-23 RX ORDER — DILTIAZEM HYDROCHLORIDE 300 MG/1
300 CAPSULE, COATED, EXTENDED RELEASE ORAL DAILY
Qty: 90 CAPSULE | Refills: 3 | Status: SHIPPED | OUTPATIENT
Start: 2020-12-23 | End: 2021-01-04 | Stop reason: SDUPTHER

## 2020-12-23 NOTE — PROGRESS NOTES
Date of Office Visit: 2020  Encounter Provider: Kelly Hodegs MD  Place of Service: [facility] CARDIOLOGY  Patient Name: Liza Mcnair  :1950      Patient ID:  Liza Mcnair is a 70 y.o. female is here for  followup for hypertension and PAF.         History of Present Illness    She has a history of mild nonobstructive coronary artery disease by cardiac catheterization done 2014.  She had atrial fibrillation induced on a stress study which has never recurred and had a normal event recorder done 2015.  She has hypertension and is on diltiazem, olmesartan.  She has hyperlipidemia for which she takes rosuvastatin.  She has hypothyroidism and is on levothyroxine.  She does have osteoarthritis. She is the primary caregiver at home as her , Aguilar, is dialysis dependent.  She went off of hydrochlorothiazide because it made her feel funny.  She sees Dr. Cooley for asthma.      She had echocardiogram done 2018 showing ejection fraction 63% with normal valves and grade 1 diastolic dysfunction.  She has been noticing exertional dyspnea and decreased stamina with activity.  stress nuclear study done 2019 showed a small inferoapical defect.        She does not smoke or use alcohol.     She had a stress study on 2019 which showed a very small area of reversibility in the inferoapical segment, possibly very slight ischemia, ejection fraction 61%.  Showed normal 4-day monitor done 2020.    She still notices palpitations and feels her heart racing at times.  She is been checking her blood pressure and its running 150s over 90s.  She is taking her medications as directed.  She has had no syncope or dizziness.  She has no exertional chest tightness or pressure.  She has no exertional dyspnea.  She has no orthopnea or PND.    Past Medical History:   Diagnosis Date   • Abnormal stress test     Stress echo on 14 showed hypokinesis of the mid anteroseptum and  distal anterior wall.   • Allergy to IVP dye     Severe itching   • Atherosclerosis     Atherosclerosis of abdominal aorta   • CAD (coronary artery disease) 09/2014    Mild, nonobstructive   • Gastric ulcer    • GERD (gastroesophageal reflux disease)    • Hemangioma     Hemangioma of liver (right lobe)   • Hyperlipidemia    • Hypertension    • Hypothyroidism     History of Hashimoto's and goiter - s/p total thyroidectomy in 11/12   • Migraine    • Mitral valve insufficiency     Moderate to severe by RAUL on 9/4/14.  Mild to moderate by echo on 10/28/15.   • Non-rheumatic tricuspid valve insufficiency    • Obstructive sleep apnea syndrome    • Osteopenia    • PAF (paroxysmal atrial fibrillation) (CMS/HCC)     Afib with RVR during stress echo on 08/21/14.  None on event recorder in 01/2015.   • Vitamin D deficiency disease          Past Surgical History:   Procedure Laterality Date   • CHOLECYSTECTOMY  1992   • EYE SURGERY      cataract surgery   • THYROID SURGERY  06/07/2012   • UTERINE FIBROID SURGERY      Several uterine ablation surgeries        Current Outpatient Medications on File Prior to Visit   Medication Sig Dispense Refill   • aspirin 325 MG tablet Take 325 mg by mouth Daily.     • Cholecalciferol (VITAMIN D) 2000 units capsule Take 4,000 Units by mouth Daily.     • coenzyme Q10 100 MG capsule Take 200 mg by mouth Every Night.     • levothyroxine (SYNTHROID) 150 MCG tablet Take 1 tablet by mouth Daily. Pt take 206 mcg on the Sunday     • olmesartan (BENICAR) 40 MG tablet Take 40 mg by mouth Daily.     • rosuvastatin (CRESTOR) 20 MG tablet TAKE 1 TABLET DAILY 90 tablet 1   • vitamin B-12 (CYANOCOBALAMIN) 500 MCG tablet Take 500 mcg by mouth Daily.     • [DISCONTINUED] dilTIAZem CD (CARDIZEM CD) 240 MG 24 hr capsule Take 1 capsule by mouth Daily. 90 capsule 3   • [DISCONTINUED] Albuterol (VENTOLIN IN) Inhale As Needed.     • [DISCONTINUED] Mometasone Furoate (ASMANEX HFA IN) Inhale 2 (Two) Times a Day.    "    No current facility-administered medications on file prior to visit.        Social History     Socioeconomic History   • Marital status:      Spouse name: Not on file   • Number of children: Not on file   • Years of education: Not on file   • Highest education level: Not on file   Tobacco Use   • Smoking status: Never Smoker   • Smokeless tobacco: Never Used   • Tobacco comment: caffeine:    Substance and Sexual Activity   • Alcohol use: Yes     Comment: Occ//Caffeine use: 2-3 cups daily   • Drug use: No   • Sexual activity: Defer           Review of Systems   Constitution: Negative.   HENT: Negative for congestion.    Eyes: Negative for vision loss in left eye and vision loss in right eye.   Respiratory: Negative.  Negative for cough, hemoptysis, shortness of breath, sleep disturbances due to breathing, snoring, sputum production and wheezing.    Endocrine: Negative.    Hematologic/Lymphatic: Negative.    Skin: Negative for poor wound healing and rash.   Musculoskeletal: Negative for falls, gout, muscle cramps and myalgias.   Gastrointestinal: Negative for abdominal pain, diarrhea, dysphagia, hematemesis, melena, nausea and vomiting.   Neurological: Negative for excessive daytime sleepiness, dizziness, headaches, light-headedness, loss of balance, seizures and vertigo.   Psychiatric/Behavioral: Negative for depression and substance abuse. The patient is not nervous/anxious.        Procedures    ECG 12 Lead    Date/Time: 12/23/2020 12:51 PM  Performed by: Kelly Hodges MD  Authorized by: Kelly Hodges MD   Comparison: compared with previous ECG   Similar to previous ECG  Rhythm: sinus rhythm  Ectopy: infrequent PVCs    Clinical impression: non-specific ECG                Objective:      Vitals:    12/23/20 1228   BP: 144/70   BP Location: Left arm   Pulse: 77   Weight: 106 kg (233 lb 6.4 oz)   Height: 157.5 cm (62\")     Body mass index is 42.69 kg/m².    Vitals signs reviewed. "   Constitutional:       General: Not in acute distress.     Appearance: Well-developed. Not diaphoretic.   Eyes:      General: No scleral icterus.     Conjunctiva/sclera: Conjunctivae normal.   HENT:      Head: Normocephalic and atraumatic.   Neck:      Musculoskeletal: Neck supple.      Thyroid: No thyromegaly.      Vascular: No carotid bruit or JVD.      Lymphadenopathy: No cervical adenopathy.   Pulmonary:      Effort: Pulmonary effort is normal. No respiratory distress.      Breath sounds: Normal breath sounds. No wheezing. No rhonchi. No rales.   Chest:      Chest wall: Not tender to palpatation.   Cardiovascular:      Normal rate. Regular rhythm.      Murmurs: There is no murmur.      No gallop.   Pulses:     Intact distal pulses.   Edema:     Peripheral edema absent.   Abdominal:      General: Bowel sounds are normal. There is no distension or abdominal bruit.      Palpations: Abdomen is soft. There is no abdominal mass.      Tenderness: There is no abdominal tenderness.   Musculoskeletal:         General: No deformity.      Extremities: No clubbing present.  Skin:     General: Skin is warm and dry. There is no cyanosis.      Coloration: Skin is not pale.      Findings: No rash.   Neurological:      Mental Status: Alert and oriented to person, place, and time.      Cranial Nerves: No cranial nerve deficit.   Psychiatric:         Judgment: Judgment normal.         Lab Review:       Assessment:      Diagnosis Plan   1. Paroxysmal atrial fibrillation (CMS/HCC)  Comprehensive Metabolic Panel    CBC & Differential    Magnesium    TSH    Uric Acid   2. Mixed hyperlipidemia  Lipid Panel   3. Essential hypertension  Comprehensive Metabolic Panel    CBC & Differential    Magnesium    TSH    Uric Acid   4. Obstructive sleep apnea syndrome       1. Hypertension, BP goal <120/80.   2. Hyperlipidemia, on rosuvastatin.   3. Mild nonobstructive CAD, no angina.   4. Dyspnea on exertion. Very mild inferoapical defect, treated  medically.   5. H/o PAF, feels like she is having more of this.      Plan:       See eusebia in 6 months, increase diltiazem CD to 300 mg daily, check labs, continue to maintain activity levels.

## 2020-12-23 NOTE — TELEPHONE ENCOUNTER
Notified patient of results. She verbalized understanding.    Yesenia Hernandez, RN  Triage [facility]

## 2021-01-04 RX ORDER — DILTIAZEM HYDROCHLORIDE 300 MG/1
300 CAPSULE, COATED, EXTENDED RELEASE ORAL DAILY
Qty: 90 CAPSULE | Refills: 3 | Status: SHIPPED | OUTPATIENT
Start: 2021-01-04 | End: 2021-11-29

## 2021-01-04 NOTE — TELEPHONE ENCOUNTER
Plan:       See eusebia in 6 months, increase diltiazem CD to 300 mg daily, check labs, continue to maintain activity levels.

## 2021-02-05 ENCOUNTER — TELEPHONE (OUTPATIENT)
Dept: INTERNAL MEDICINE | Facility: CLINIC | Age: 71
End: 2021-02-05

## 2021-02-05 ENCOUNTER — OFFICE VISIT (OUTPATIENT)
Dept: INTERNAL MEDICINE | Facility: CLINIC | Age: 71
End: 2021-02-05

## 2021-02-05 VITALS
TEMPERATURE: 97.7 F | WEIGHT: 237.4 LBS | OXYGEN SATURATION: 98 % | BODY MASS INDEX: 43.69 KG/M2 | HEART RATE: 76 BPM | HEIGHT: 62 IN | DIASTOLIC BLOOD PRESSURE: 88 MMHG | SYSTOLIC BLOOD PRESSURE: 138 MMHG

## 2021-02-05 DIAGNOSIS — I48.0 PAROXYSMAL ATRIAL FIBRILLATION (HCC): ICD-10-CM

## 2021-02-05 DIAGNOSIS — R73.01 IFG (IMPAIRED FASTING GLUCOSE): ICD-10-CM

## 2021-02-05 DIAGNOSIS — E03.9 ACQUIRED HYPOTHYROIDISM: ICD-10-CM

## 2021-02-05 DIAGNOSIS — I10 ESSENTIAL HYPERTENSION: ICD-10-CM

## 2021-02-05 DIAGNOSIS — E55.9 VITAMIN D DEFICIENCY: ICD-10-CM

## 2021-02-05 DIAGNOSIS — E78.2 MIXED HYPERLIPIDEMIA: ICD-10-CM

## 2021-02-05 DIAGNOSIS — Z00.00 MEDICARE ANNUAL WELLNESS VISIT, SUBSEQUENT: Primary | ICD-10-CM

## 2021-02-05 PROCEDURE — 99203 OFFICE O/P NEW LOW 30 MIN: CPT | Performed by: INTERNAL MEDICINE

## 2021-02-05 PROCEDURE — 1160F RVW MEDS BY RX/DR IN RCRD: CPT | Performed by: INTERNAL MEDICINE

## 2021-02-05 PROCEDURE — G0439 PPPS, SUBSEQ VISIT: HCPCS | Performed by: INTERNAL MEDICINE

## 2021-02-05 PROCEDURE — 1170F FXNL STATUS ASSESSED: CPT | Performed by: INTERNAL MEDICINE

## 2021-02-05 RX ORDER — LEVOTHYROXINE SODIUM 112 UG/1
TABLET ORAL
COMMUNITY
Start: 2020-11-23 | End: 2021-06-23

## 2021-02-05 NOTE — TELEPHONE ENCOUNTER
"PATIENT CALLED CONCERNED ABOUT HER AFTER VISIT SUMMARY SHE RECEIVED TODAY. PATIENT STATED SUMMARY SAID \" INCREASED FASTING BLOOD SUGAR\" PATIENT IS CONCERNED ABOUT WHAT THAT MEANS. PLEASE ADVISE.     CALLBACK NUMBER [phone]  " 36.3

## 2021-02-05 NOTE — ASSESSMENT & PLAN NOTE
Hypertension is unchanged.  Continue current treatment regimen.  Dietary sodium restriction.  Blood pressure will be reassessed at the next regular appointment.

## 2021-02-05 NOTE — PROGRESS NOTES
"Chief Complaint  Establish Care (New patient), Hypertension, Hyperlipidemia, Hypothyroidism, and Medicare Wellness-subsequent    Subjective          Liza Mcnair presents to [facility] INTERNAL MEDICINE for   Hypothyroidism  This is a chronic problem. The current episode started more than 1 year ago. The problem occurs constantly. The problem has been unchanged. Associated symptoms include fatigue (mild - taking care of  on dialysis). Pertinent negatives include no chest pain, chills, coughing or fever.   Hypertension  This is a chronic problem. The current episode started more than 1 year ago. The problem is unchanged. The problem is controlled. Associated symptoms include palpitations (occ) and shortness of breath (treated by cardiologist (atrial fib)). Pertinent negatives include no chest pain.   Hyperlipidemia  This is a chronic problem. The current episode started more than 1 year ago. The problem is controlled. Recent lipid tests were reviewed and are normal. Exacerbating diseases include hypothyroidism. She has no history of diabetes. Associated symptoms include shortness of breath (treated by cardiologist (atrial fib)). Pertinent negatives include no chest pain.       Review of Systems   Constitutional: Positive for fatigue (mild - taking care of  on dialysis). Negative for chills and fever.   Respiratory: Positive for shortness of breath (treated by cardiologist (atrial fib)). Negative for cough and wheezing.    Cardiovascular: Positive for palpitations (occ). Negative for chest pain and leg swelling.        Objective   Vital Signs:   /88 (BP Location: Left arm, Patient Position: Sitting, Cuff Size: Adult)   Pulse 76   Temp 97.7 °F (36.5 °C)   Ht 157.5 cm (62\")   Wt 108 kg (237 lb 6.4 oz)   SpO2 98%   BMI 43.42 kg/m²     Physical Exam   Result Review :                 Assessment and Plan    Problem List Items Addressed This Visit        Unprioritized    " Hyperlipidemia    Current Assessment & Plan     Lipid abnormalities are improving with treatment.  Nutritional counseling was provided.  Lipids will be reassessed in 6 months.         Hypothyroidism    Overview     I         Current Assessment & Plan     Continue f/u with endocrine.         Relevant Medications    levothyroxine (Synthroid) 112 MCG tablet    Paroxysmal atrial fibrillation (CMS/[facility])    Current Assessment & Plan     F/yovany with cardiologist & continue ASA.-         Vitamin D deficiency    IFG (impaired fasting glucose)    Hypertension    Current Assessment & Plan     Hypertension is unchanged.  Continue current treatment regimen.  Dietary sodium restriction.  Blood pressure will be reassessed at the next regular appointment.         Body mass index (BMI) of 40.0-44.9 in adult (CMS/[facility])      Other Visit Diagnoses     Medicare annual wellness visit, subsequent    -  Primary          Follow Up   Return in about 3 months (around 5/5/2021) for Recheck.  Patient was given instructions and counseling regarding her condition or for health maintenance advice. Please see specific information pulled into the AVS if appropriate.      We discussed need for daily exercise and a very good low-fat low sugar diet.  She would feel better with weight loss which we discussed.  She may try Mediterranean diet which is the healthiest.

## 2021-02-05 NOTE — PROGRESS NOTES
The ABCs of the Annual Wellness Visit  Subsequent Medicare Wellness Visit    Chief Complaint   Patient presents with   • Establish Care     New patient   • Hypertension   • Hyperlipidemia   • Hypothyroidism   • Medicare Wellness-subsequent       Subjective   History of Present Illness:  Liza Mcnair is a 70 y.o. female who presents for a Subsequent Medicare Wellness Visit.    HEALTH RISK ASSESSMENT    Recent Hospitalizations:  No hospitalization(s) within the last year.    Current Medical Providers:  Patient Care Team:  Ryann Cornelius MD as PCP - General (Internal Medicine)  Kelly Hodges MD as Consulting Physician (Cardiology)  Elsa Bloom MD as Consulting Physician (Endocrinology)  Neftaly Cooley MD as Consulting Physician (Pulmonary Disease)  Tamiko De Leon MD as Consulting Physician (Ophthalmology)  Rossana Manzano MD as Consulting Physician (Dermatology)  Diana Cooper MD as Consulting Physician (Gynecology)    Smoking Status:  Social History     Tobacco Use   Smoking Status Never Smoker   Smokeless Tobacco Never Used   Tobacco Comment    caffeine:        Alcohol Consumption:  Social History     Substance and Sexual Activity   Alcohol Use Yes    Comment: Occ//Caffeine use: 2-3 cups daily       Depression Screen:   PHQ-2/PHQ-9 Depression Screening 2/5/2021   Little interest or pleasure in doing things 0   Feeling down, depressed, or hopeless 0   Trouble falling or staying asleep, or sleeping too much 0   Feeling tired or having little energy 1   Poor appetite or overeating 0   Feeling bad about yourself - or that you are a failure or have let yourself or your family down 0   Trouble concentrating on things, such as reading the newspaper or watching television 0   Moving or speaking so slowly that other people could have noticed. Or the opposite - being so fidgety or restless that you have been moving around a lot more than usual 0   Thoughts that you would be better off dead, or  of hurting yourself in some way 0   Total Score 1       Fall Risk Screen:  TIAN Fall Risk Assessment was completed, and patient is at LOW risk for falls.Assessment completed on:2/5/2021    Health Habits and Functional and Cognitive Screening:  Functional & Cognitive Status 2/5/2021   Do you have difficulty preparing food and eating? No   Do you have difficulty bathing yourself, getting dressed or grooming yourself? No   Do you have difficulty using the toilet? No   Do you have difficulty moving around from place to place? Yes   Do you have trouble with steps or getting out of a bed or a chair? No   Current Diet Well Balanced Diet   Dental Exam Up to date   Eye Exam Up to date   Exercise (times per week) 5 times per week   Current Exercises Include House Cleaning        Exercise Comment yard work, walking   Do you need help using the phone?  No   Are you deaf or do you have serious difficulty hearing?  No   Do you need help with transportation? No   Do you need help shopping? No   Do you need help preparing meals?  No   Do you need help with housework?  No   Do you need help with laundry? No   Do you need help taking your medications? No   Do you need help managing money? No   Do you ever drive or ride in a car without wearing a seat belt? No   Have you felt unusual stress, anger or loneliness in the last month? No   Who do you live with? Spouse   If you need help, do you have trouble finding someone available to you? No   Have you been bothered in the last four weeks by sexual problems? No   Do you have difficulty concentrating, remembering or making decisions? No         Does the patient have evidence of cognitive impairment? No    Asprin use counseling:Taking ASA appropriately as indicated    Age-appropriate Screening Schedule:  Refer to the list below for future screening recommendations based on patient's age, sex and/or medical conditions. Orders for these recommended tests are listed in the plan section. The  patient has been provided with a written plan.    Health Maintenance   Topic Date Due   • DXA SCAN  12/23/2018   • TDAP/TD VACCINES (2 - Td) 01/01/2021   • LIPID PANEL  12/23/2021   • MAMMOGRAM  01/05/2023   • COLONOSCOPY  12/13/2023   • INFLUENZA VACCINE  Completed   • ZOSTER VACCINE  Completed          The following portions of the patient's history were reviewed and updated as appropriate: allergies, current medications, past family history, past medical history, past social history, past surgical history and problem list.    Outpatient Medications Prior to Visit   Medication Sig Dispense Refill   • aspirin 325 MG tablet Take 325 mg by mouth Daily.     • Cholecalciferol (VITAMIN D) 2000 units capsule Take 4,000 Units by mouth Daily.     • coenzyme Q10 100 MG capsule Take 200 mg by mouth Every Night.     • dilTIAZem CD (CARDIZEM CD) 300 MG 24 hr capsule Take 1 capsule by mouth Daily. 90 capsule 3   • levothyroxine (Synthroid) 112 MCG tablet      • levothyroxine (SYNTHROID) 150 MCG tablet Take 1 tablet by mouth Daily. Pt take 206 mcg on the Sunday     • olmesartan (BENICAR) 40 MG tablet Take 40 mg by mouth Daily.     • rosuvastatin (CRESTOR) 20 MG tablet TAKE 1 TABLET DAILY 90 tablet 1   • vitamin B-12 (CYANOCOBALAMIN) 500 MCG tablet Take 500 mcg by mouth Daily.       No facility-administered medications prior to visit.        Patient Active Problem List   Diagnosis   • Degeneration of intervertebral disc   • Diffuse goiter   • Hyperlipidemia   • Hypothyroidism   • Obstructive sleep apnea syndrome   • Osteoarthritis of knee   • Paroxysmal atrial fibrillation (CMS/[facility])   • Vitamin D deficiency   • IFG (impaired fasting glucose)   • Hypertension   • Body mass index (BMI) of 40.0-44.9 in adult (CMS/HCC)       Advanced Care Planning:  ACP discussion was held with the patient during this visit. Patient has an advance directive (not in EMR), copy requested.    Review of Systems    Compared to one year ago, the patient  "feels her physical health is the same.  Compared to one year ago, the patient feels her mental health is the same.    Reviewed chart for potential of high risk medication in the elderly: yes  Reviewed chart for potential of harmful drug interactions in the elderly:yes    Objective         Vitals:    02/05/21 0821   BP: 138/88   BP Location: Left arm   Patient Position: Sitting   Cuff Size: Adult   Pulse: 76   Temp: 97.7 °F (36.5 °C)   SpO2: 98%   Weight: 108 kg (237 lb 6.4 oz)   Height: 157.5 cm (62\")       Body mass index is 43.42 kg/m².  Discussed the patient's BMI with her. The BMI is above average; BMI management plan is completed.    Physical Exam    Lab Results   Component Value Date    TRIG 66 12/23/2020    HDL 84 (H) 12/23/2020    LDL 77 12/23/2020    VLDL 13 12/23/2020        Assessment/Plan   Medicare Risks and Personalized Health Plan  CMS Preventative Services Quick Reference  Abdominal Aortic Aneurysm Screening  Advance Directive Discussion  Cardiovascular risk  Inactivity/Sedentary  Obesity/Overweight     The above risks/problems have been discussed with the patient.  Pertinent information has been shared with the patient in the After Visit Summary.  Follow up plans and orders are seen below in the Assessment/Plan Section.    Diagnoses and all orders for this visit:    1. Medicare annual wellness visit, subsequent (Primary)    2. Essential hypertension  Assessment & Plan:  Hypertension is unchanged.  Continue current treatment regimen.  Dietary sodium restriction.  Blood pressure will be reassessed at the next regular appointment.      3. Mixed hyperlipidemia  Assessment & Plan:  Lipid abnormalities are improving with treatment.  Nutritional counseling was provided.  Lipids will be reassessed in 6 months.      4. Paroxysmal atrial fibrillation (CMS/[facility])  Assessment & Plan:  F/yovany with cardiologist & continue ASA.-      5. Acquired hypothyroidism  Assessment & Plan:  Continue f/u with endocrine.      6. " Vitamin D deficiency    7. IFG (impaired fasting glucose)    8. Body mass index (BMI) of 40.0-44.9 in adult (CMS/[facility])    Follow Up:  Return in about 3 months (around 5/5/2021) for Recheck.     An After Visit Summary and PPPS were given to the patient.      Need screening for AAA & carotid disease.  Information given today.   Need daily strengthening & balance exercises (shown today).

## 2021-02-05 NOTE — PATIENT INSTRUCTIONS
Medicare Wellness  Personal Prevention Plan of Service     Date of Office Visit:  2021  Encounter Provider:  Ryann Cornelius MD  Place of Service:  [facility] INTERNAL MEDICINE  Patient Name: Liza Mcnair  :  1950    As part of the Medicare Wellness portion of your visit today, we are providing you with this personalized preventive plan of services (PPPS). This plan is based upon recommendations of the [place] Preventive Services Task Force (USPSTF) and the Advisory Committee on Immunization Practices (ACIP).    This lists the preventive care services that should be considered, and provides dates of when you are due. Items listed as completed are up-to-date and do not require any further intervention.    Health Maintenance   Topic Date Due   • ANNUAL WELLNESS VISIT  2016   • DXA SCAN  2018   • INFLUENZA VACCINE  2020   • TDAP/TD VACCINES (2 - Td) 2021   • LIPID PANEL  2021   • MAMMOGRAM  2023   • COLONOSCOPY  2023   • HEPATITIS C SCREENING  Completed   • Pneumococcal Vaccine 65+  Completed   • ZOSTER VACCINE  Completed   • MENINGOCOCCAL VACCINE  Aged Out       No orders of the defined types were placed in this encounter.      No follow-ups on file.

## 2021-02-11 NOTE — TELEPHONE ENCOUNTER
Ryann Cornelius MD Melhorn, Brittney L, MA 6 days ago     Pls call - that diagnosis was from old chart - I will remove it if not accurate (today was 1st visit)    Message text

## 2021-04-19 NOTE — TELEPHONE ENCOUNTER
Diltiazem refilled. Dr. Hodges's last office note reviewed.    Medication refill requested: wellbutrin  Last office visit: 1/15/21  Next office visit: none  Last refilled: 5/22/20 #180x3  Pharmacy:   [address] [place] [address] [address] [place] [address]  Phone: [phone] Fax: [phone]      Pended: 180x1

## 2021-06-23 ENCOUNTER — OFFICE VISIT (OUTPATIENT)
Dept: CARDIOLOGY | Facility: CLINIC | Age: 71
End: 2021-06-23

## 2021-06-23 VITALS
HEART RATE: 80 BPM | SYSTOLIC BLOOD PRESSURE: 132 MMHG | DIASTOLIC BLOOD PRESSURE: 88 MMHG | WEIGHT: 236.4 LBS | BODY MASS INDEX: 43.5 KG/M2 | HEIGHT: 62 IN

## 2021-06-23 DIAGNOSIS — I48.0 PAROXYSMAL ATRIAL FIBRILLATION (HCC): Primary | ICD-10-CM

## 2021-06-23 DIAGNOSIS — I49.8 FLUTTERING HEART: ICD-10-CM

## 2021-06-23 DIAGNOSIS — I10 ESSENTIAL HYPERTENSION: ICD-10-CM

## 2021-06-23 DIAGNOSIS — G47.33 OBSTRUCTIVE SLEEP APNEA SYNDROME: ICD-10-CM

## 2021-06-23 DIAGNOSIS — R60.0 LOWER EXTREMITY EDEMA: ICD-10-CM

## 2021-06-23 PROCEDURE — 99214 OFFICE O/P EST MOD 30 MIN: CPT | Performed by: NURSE PRACTITIONER

## 2021-06-23 PROCEDURE — 93000 ELECTROCARDIOGRAM COMPLETE: CPT | Performed by: NURSE PRACTITIONER

## 2021-06-23 RX ORDER — SPIRONOLACTONE 25 MG/1
25 TABLET ORAL DAILY
Qty: 30 TABLET | Refills: 1 | Status: SHIPPED | OUTPATIENT
Start: 2021-06-23 | End: 2021-08-09

## 2021-06-24 NOTE — PROGRESS NOTES
Date of Office Visit: 2021  Encounter Provider: MIMI Shelton  Place of Service: [facility] CARDIOLOGY  Patient Name: Liza Mcnair  :1950  Primary Cardiologist: Dr. Kelly Hodges     Chief Complaint   Patient presents with   • Atrial Fibrillation   • Hypertension   :     HPI: Liza Mcnair is a pleasant 70 y.o. female who presents today for cardiac follow-up visit.  I have reviewed her medical records.    She has been diagnosed with hypertension, hyperlipidemia, hypothyroidism, and obesity.    In 2014, she was diagnosed with mild nonobstructive coronary artery disease per coronary angiography.  During her previous stress study, she developed atrial fibrillation.  In 2015, her event recorder was normal.  In 2018, echocardiogram showed normal LVEF of 63% and grade 1 diastolic dysfunction.    In 2019, she reported dyspnea and decreased stamina.  Nuclear stress test showed a mild abnormality in Dr. Hodges felt that it was a low risk study.  She recommended good blood pressure control.    In 2020, she followed up with Dr. Hodges and reported heart racing at times.  Blood pressure was running 150s/90s.  Her diltiazem was increased to 300 mg 1 tablet daily.    She presents today for follow-up visit.  Her diastolic blood pressure is mildly elevated and she is running 130/80s at home.  Dr. Hodges has recommended goal of 120/80s or less.  Her palpitations have improved on her current dose of diltiazem.  She reports some mild shortness of breath, brief palpitations lasting a few seconds intermittently, lower extremity edema, and fatigue.  She denies chest pain, PND, orthopnea, dizziness, syncope, or bleeding.    Past Medical History:   Diagnosis Date   • Abnormal stress test     Stress echo on 14 showed hypokinesis of the mid anteroseptum and distal anterior wall.   • Allergy to IVP dye     Severe itching   •  Atherosclerosis     Atherosclerosis of abdominal aorta   • CAD (coronary artery disease) 09/2014    Mild, nonobstructive   • Gastric ulcer    • GERD (gastroesophageal reflux disease)    • Hemangioma     Hemangioma of liver (right lobe)   • Hyperlipidemia    • Hypertension    • Hypothyroidism     History of Hashimoto's and goiter - s/p total thyroidectomy in 11/12   • Migraine    • Mitral valve insufficiency     Moderate to severe by RAUL on 9/4/14.  Mild to moderate by echo on 10/28/15.   • Non-rheumatic tricuspid valve insufficiency    • Obstructive sleep apnea syndrome    • Osteopenia    • PAF (paroxysmal atrial fibrillation) (CMS/HCC)     Afib with RVR during stress echo on 08/21/14.  None on event recorder in 01/2015.   • Vitamin D deficiency disease        Past Surgical History:   Procedure Laterality Date   • CHOLECYSTECTOMY  1992   • EYE SURGERY      cataract surgery   • THYROID SURGERY  06/07/2012   • UTERINE FIBROID SURGERY      Several uterine ablation surgeries        Social History     Socioeconomic History   • Marital status:      Spouse name: Not on file   • Number of children: Not on file   • Years of education: Not on file   • Highest education level: Not on file   Tobacco Use   • Smoking status: Never Smoker   • Smokeless tobacco: Never Used   • Tobacco comment: caffeine:    Substance and Sexual Activity   • Alcohol use: Not Currently     Comment: Occ//Caffeine use: 2-3 cups daily   • Drug use: No   • Sexual activity: Defer       Family History   Problem Relation Age of Onset   • Goiter Mother    • Pancreatic cancer Mother    • Heart failure Father    • Hypertension Father    • Diabetes Sister    • Coronary artery disease Sister         Sister with 3 vessel CABG at age 62   • Heart disease Sister    • No Known Problems Maternal Grandmother    • No Known Problems Maternal Grandfather    • Diabetes Paternal Grandmother    • No Known Problems Paternal Grandfather        The following portion of  "the patient's history were reviewed and updated as appropriate: past medical history, past surgical history, past social history, past family history, allergies, current medications, and problem list.    Review of Systems   Constitutional: Positive for weight gain.   Cardiovascular: Positive for dyspnea on exertion, leg swelling and palpitations.   Respiratory: Negative.    Musculoskeletal: Positive for joint pain and myalgias.   Neurological: Negative.        Allergies   Allergen Reactions   • Iodinated Diagnostic Agents Itching   • Iodine Itching   • Tartrazine Itching     Cat scan dye   • Hydrochlorothiazide Other (See Comments)         Current Outpatient Medications:   •  aspirin 325 MG tablet, Take 325 mg by mouth Daily., Disp: , Rfl:   •  Cholecalciferol (VITAMIN D) 2000 units capsule, Take 4,000 Units by mouth Daily., Disp: , Rfl:   •  coenzyme Q10 100 MG capsule, Take 200 mg by mouth Every Night., Disp: , Rfl:   •  dilTIAZem CD (CARDIZEM CD) 300 MG 24 hr capsule, Take 1 capsule by mouth Daily., Disp: 90 capsule, Rfl: 3  •  levothyroxine (SYNTHROID) 150 MCG tablet, Take 1 tablet by mouth Daily. Wed and Sunday extra half, Disp: , Rfl:   •  olmesartan (BENICAR) 40 MG tablet, Take 40 mg by mouth Daily., Disp: , Rfl:   •  rosuvastatin (CRESTOR) 20 MG tablet, TAKE 1 TABLET DAILY, Disp: 90 tablet, Rfl: 1  •  vitamin B-12 (CYANOCOBALAMIN) 500 MCG tablet, Take 500 mcg by mouth Daily., Disp: , Rfl:   •  spironolactone (ALDACTONE) 25 MG tablet, Take 1 tablet by mouth Daily., Disp: 30 tablet, Rfl: 1        Objective:     Vitals:    06/23/21 1430 06/23/21 1434   BP: 132/88 132/88   BP Location: Left arm Right arm   Pulse: 80    Weight: 107 kg (236 lb 6.4 oz)    Height: 157.5 cm (62\")      Body mass index is 43.24 kg/m².    PHYSICAL EXAM:    Vitals Reviewed.   General Appearance: No acute distress, well developed and well nourished. Obese.   Eyes: Conjunctiva and lids: No erythema, swelling, or discharge. Sclera " non-icteric.   HENT: Atraumatic, normocephalic. External eyes, ears, and nose normal. No hearing loss noted. Mucous membranes normal. Lips not cyanotic. Neck supple with no tenderness.  Respiratory: No signs of respiratory distress. Respiration rhythm and depth normal.   Clear to auscultation. No rales, crackles, rhonchi, or wheezing auscultated.   Cardiovascular:  Jugular Venous Pressure: Normal  Heart Rate and Rhythm: Normal, Heart Sounds: Normal S1 and S2. No S3 or S4 noted.  Murmurs: No murmurs noted. No rubs, thrills, or gallops.   Lower Extremities: Bilateral trace lower extremity edema noted.  Gastrointestinal:  Abdomen soft, non-distended, non-tender. Normal bowel sounds.    Musculoskeletal: Normal movement of extremities  Skin and Nails: General appearance normal. No pallor, cyanosis, diaphoresis. Skin temperature normal. No clubbing of fingernails.   Psychiatric: Patient alert and oriented to person, place, and time. Speech and behavior appropriate. Normal mood and affect.       ECG 12 Lead    Date/Time: 6/23/2021 2:30 PM  Performed by: Lanette Sellers APRN  Authorized by: Lanette Sellers APRN   Comparison: compared with previous ECG from 12/23/2020  Similar to previous ECG  Rhythm: sinus rhythm  Rate: normal  BPM: 80  Conduction: conduction normal  ST Segments: ST segments normal  T Waves: T waves normal  QRS axis: normal  Other: no other findings    Clinical impression: normal ECG and pericarditis              Assessment:       Diagnosis Plan   1. Paroxysmal atrial fibrillation (CMS/[facility])     2. Fluttering heart     3. Lower extremity edema     4. Essential hypertension  Comprehensive Metabolic Panel   5. Obstructive sleep apnea syndrome     6. Body mass index (BMI) of 40.0-44.9 in adult (CMS/[facility])            Plan:       1/2.  Paroxysmal Atrial Fibrillation: She is currently in a normal sinus rhythm.  Her palpitations have improved on her current dose of diltiazem, but not resolved completely.  She has  opted not to be on anticoagulation and has chosen aspirin only.  She denies any strokelike symptoms.  If her palpitations increase in severity, duration, or frequency, I asked her to call the office.    3.  Lower Extremity Edema: Possibly due to her weight and calcium channel blocker.  I recommended elevation of legs, low-sodium diet, and adding spironolactone 25 mg 1 tablet daily to her regimen.    4.  Hypertension: Blood pressure goal 120s/80s or less.  The lower the blood pressure the better and this will help to decrease her risk of recurrent atrial fibrillation.  I recommended the addition of spironolactone 25 mg 1 tablet daily and a repeat CMP in 7 to 10 days. She will call me with an update in one month.  she is very hesitant to start any new medications that could potentially affect her liver or kidneys.  I have reassured her on this.     5.  Obstructive Sleep Apnea: Compliant with CPAP machine.    6.  Obesity: BMI 43.2. I have recommended weight loss and regular exercise.     7. I have recommended a follow up visit with Dr. Hodges in 6 months, unless otherwise needed sooner.     As always, it has been a pleasure to participate in your patient's care. Thank you.       Sincerely,       MIMI Pineda  [facility] Cardiology      · COVID-19 Precautions - Patient was compliant in wearing a mask. When I saw the patient, I used appropriate personal protective equipment (PPE) including mask and eye shield (standard procedure).  Additionally, I used gown and gloves if indicated.  Hand hygiene was completed before and after seeing the patient.  · Dictated utilizing Dragon Dictation  I spent 34 minutes reviewing her medical records/testing/previous office notes/labs, face-to-face interaction with patient, physical examination, formulating the plan of care, and discussion of plan of care with patient.  ·

## 2021-07-06 ENCOUNTER — LAB (OUTPATIENT)
Dept: LAB | Facility: HOSPITAL | Age: 71
End: 2021-07-06

## 2021-07-06 DIAGNOSIS — I10 ESSENTIAL HYPERTENSION: ICD-10-CM

## 2021-07-06 LAB
ALBUMIN SERPL-MCNC: 4.3 G/DL (ref 3.5–5.2)
ALBUMIN/GLOB SERPL: 1.4 G/DL
ALP SERPL-CCNC: 83 U/L (ref 39–117)
ALT SERPL W P-5'-P-CCNC: 20 U/L (ref 1–33)
ANION GAP SERPL CALCULATED.3IONS-SCNC: 10.2 MMOL/L (ref 5–15)
AST SERPL-CCNC: 16 U/L (ref 1–32)
BILIRUB SERPL-MCNC: 0.7 MG/DL (ref 0–1.2)
BUN SERPL-MCNC: 18 MG/DL (ref 8–23)
BUN/CREAT SERPL: 21.4 (ref 7–25)
CALCIUM SPEC-SCNC: 9.3 MG/DL (ref 8.6–10.5)
CHLORIDE SERPL-SCNC: 106 MMOL/L (ref 98–107)
CO2 SERPL-SCNC: 25.8 MMOL/L (ref 22–29)
CREAT SERPL-MCNC: 0.84 MG/DL (ref 0.57–1)
GFR SERPL CREATININE-BSD FRML MDRD: 67 ML/MIN/1.73
GLOBULIN UR ELPH-MCNC: 3 GM/DL
GLUCOSE SERPL-MCNC: 99 MG/DL (ref 65–99)
POTASSIUM SERPL-SCNC: 4.3 MMOL/L (ref 3.5–5.2)
PROT SERPL-MCNC: 7.3 G/DL (ref 6–8.5)
SODIUM SERPL-SCNC: 142 MMOL/L (ref 136–145)

## 2021-07-06 PROCEDURE — 80053 COMPREHEN METABOLIC PANEL: CPT

## 2021-07-06 PROCEDURE — 36415 COLL VENOUS BLD VENIPUNCTURE: CPT

## 2021-07-07 ENCOUNTER — TELEPHONE (OUTPATIENT)
Dept: CARDIOLOGY | Facility: CLINIC | Age: 71
End: 2021-07-07

## 2021-07-07 NOTE — TELEPHONE ENCOUNTER
----- Message from MIMI Kyle sent at 7/6/2021 10:27 PM EDT -----  Please call patient.  Her CMP was normal.  Please reassure her that her kidney function and liver enzymes are both normal with the addition of spironolactone.  She was concerned about this in the office.  Please reassess her home blood pressure readings and let me know.  Thank you so much.

## 2021-07-07 NOTE — TELEPHONE ENCOUNTER
Called and spoke with patient. Went over results and recommendations. She verbalized understanding.    1) She said she is having palpitations when she wakes up in the morning.    2) She is concerned about being on olmesartan and spironolactone because she read there could be possible medication interactions between the two    3) B/P readings:  154/86, HR 84  153/86, HR 91  163/91, HR 86  147/81, HR 77  128/74, HR 66  130/73, HR 67  130/71, HR 74    Thank you,    Yesenia Hernandez, RN  Triage [facility] [place] [facility]

## 2021-07-08 NOTE — TELEPHONE ENCOUNTER
There is no concern between the 2 medications as long as the blood work is stable.    In May 2020, she wore a Holter monitor which was normal.      Does she feel that she needs to wear another Holter monitor?  Or does she want to increase the diltiazem?    If she wants to increase the diltiazem, I would recommend increasing to 360 mg 1 tablet at nighttime.  She can then make a phone appointment to see me in 1 month.      Please let me know what she decides.  Thank you.

## 2021-07-09 NOTE — TELEPHONE ENCOUNTER
Pt called back and I have reviewed your message with her.    She doesn't want to go up on her meds at this time and will call back if she feels she will need a holter.  I have scheduled her to be seen by you 8/9.  I made this an in office apt because she explains that she feels the palpitations mainly in the am and thought she may need an EKG when she comes in.  She is agreeable to an in office apt vs a telephone apt.  Thanks  Rossana Bermeo RN  Triage nurse

## 2021-07-16 ENCOUNTER — TRANSCRIBE ORDERS (OUTPATIENT)
Dept: ADMINISTRATIVE | Facility: HOSPITAL | Age: 71
End: 2021-07-16

## 2021-07-16 ENCOUNTER — LAB (OUTPATIENT)
Dept: LAB | Facility: HOSPITAL | Age: 71
End: 2021-07-16

## 2021-07-16 DIAGNOSIS — E89.0 POSTSURGICAL HYPOTHYROIDISM: ICD-10-CM

## 2021-07-16 DIAGNOSIS — E06.3 CHRONIC LYMPHOCYTIC THYROIDITIS: ICD-10-CM

## 2021-07-16 DIAGNOSIS — E89.0 POSTSURGICAL HYPOTHYROIDISM: Primary | ICD-10-CM

## 2021-07-16 DIAGNOSIS — R68.89 MECHANICAL AND MOTOR PROBLEMS WITH INTERNAL ORGANS: ICD-10-CM

## 2021-07-16 LAB
ALBUMIN SERPL-MCNC: 4.2 G/DL (ref 3.5–5.2)
ALBUMIN/GLOB SERPL: 1.6 G/DL
ALP SERPL-CCNC: 69 U/L (ref 39–117)
ALT SERPL W P-5'-P-CCNC: 17 U/L (ref 1–33)
ANION GAP SERPL CALCULATED.3IONS-SCNC: 9.3 MMOL/L (ref 5–15)
AST SERPL-CCNC: 14 U/L (ref 1–32)
BILIRUB SERPL-MCNC: 0.4 MG/DL (ref 0–1.2)
BUN SERPL-MCNC: 18 MG/DL (ref 8–23)
BUN/CREAT SERPL: 22.2 (ref 7–25)
CALCIUM SPEC-SCNC: 9.5 MG/DL (ref 8.6–10.5)
CHLORIDE SERPL-SCNC: 107 MMOL/L (ref 98–107)
CO2 SERPL-SCNC: 25.7 MMOL/L (ref 22–29)
CREAT SERPL-MCNC: 0.81 MG/DL (ref 0.57–1)
GFR SERPL CREATININE-BSD FRML MDRD: 70 ML/MIN/1.73
GLOBULIN UR ELPH-MCNC: 2.7 GM/DL
GLUCOSE SERPL-MCNC: 93 MG/DL (ref 65–99)
POTASSIUM SERPL-SCNC: 4.4 MMOL/L (ref 3.5–5.2)
PROT SERPL-MCNC: 6.9 G/DL (ref 6–8.5)
SODIUM SERPL-SCNC: 142 MMOL/L (ref 136–145)
T3FREE SERPL-MCNC: 2.99 PG/ML (ref 2–4.4)
T4 FREE SERPL-MCNC: 1.48 NG/DL (ref 0.93–1.7)
T4 SERPL-MCNC: 9.81 MCG/DL (ref 4.5–11.7)
TSH SERPL DL<=0.05 MIU/L-ACNC: 2.87 UIU/ML (ref 0.27–4.2)

## 2021-07-16 PROCEDURE — 84443 ASSAY THYROID STIM HORMONE: CPT

## 2021-07-16 PROCEDURE — 84481 FREE ASSAY (FT-3): CPT

## 2021-07-16 PROCEDURE — 80053 COMPREHEN METABOLIC PANEL: CPT

## 2021-07-16 PROCEDURE — 36415 COLL VENOUS BLD VENIPUNCTURE: CPT

## 2021-07-16 PROCEDURE — 84439 ASSAY OF FREE THYROXINE: CPT

## 2021-08-09 ENCOUNTER — OFFICE VISIT (OUTPATIENT)
Dept: CARDIOLOGY | Facility: CLINIC | Age: 71
End: 2021-08-09

## 2021-08-09 VITALS
WEIGHT: 235 LBS | HEART RATE: 78 BPM | DIASTOLIC BLOOD PRESSURE: 84 MMHG | BODY MASS INDEX: 43.24 KG/M2 | HEIGHT: 62 IN | SYSTOLIC BLOOD PRESSURE: 140 MMHG

## 2021-08-09 DIAGNOSIS — I51.89 DIASTOLIC DYSFUNCTION: ICD-10-CM

## 2021-08-09 DIAGNOSIS — G47.33 OBSTRUCTIVE SLEEP APNEA: ICD-10-CM

## 2021-08-09 DIAGNOSIS — I10 ESSENTIAL HYPERTENSION: Primary | ICD-10-CM

## 2021-08-09 DIAGNOSIS — I48.0 PAROXYSMAL ATRIAL FIBRILLATION (HCC): ICD-10-CM

## 2021-08-09 PROCEDURE — 99214 OFFICE O/P EST MOD 30 MIN: CPT | Performed by: NURSE PRACTITIONER

## 2021-08-09 PROCEDURE — 93000 ELECTROCARDIOGRAM COMPLETE: CPT | Performed by: NURSE PRACTITIONER

## 2021-08-09 RX ORDER — DILTIAZEM HYDROCHLORIDE 120 MG/1
120 CAPSULE, EXTENDED RELEASE ORAL DAILY
COMMUNITY
End: 2021-08-09 | Stop reason: DRUGHIGH

## 2021-08-09 RX ORDER — CHLORTHALIDONE 25 MG/1
25 TABLET ORAL DAILY
Qty: 30 TABLET | Refills: 1 | Status: SHIPPED | OUTPATIENT
Start: 2021-08-09 | End: 2022-02-08 | Stop reason: ALTCHOICE

## 2021-08-09 NOTE — PROGRESS NOTES
Date of Office Visit: 2021  Encounter Provider: MIMI Shelton  Place of Service: [facility] CARDIOLOGY  Patient Name: Liza Mcnair  :1950  Primary Cardiologist: Dr. Kelly Hodges     Chief Complaint   Patient presents with   • Hypertension   :     HPI: Liza Mcnair is a pleasant 71 y.o. female who presents today for discussion of blood pressure.  I have reviewed her medical records. She has been diagnosed with hypertension, hyperlipidemia, hypothyroidism, and obesity.    In 2014, she was diagnosed with mild nonobstructive coronary artery disease per coronary angiography during the Myoview stress study, she developed atrial fibrillation.  In 2015, her event recorder was normal.  In 2018, echocardiogram showed normal LVEF of 63% and grade 1 diastolic dysfunction.    In 2019, she reported dyspnea and decreased stamina.  Nuclear stress test showed a mild abnormality that Dr. Hodges felt was low risk.  She recommended good blood pressure control.    In 2020, she followed up with Dr. Hodges and reported heart racing at times.  Blood pressure was running 150s/90s.  Her diltiazem was increased to 300 mg 1 tablet daily.    In 2021, she saw me in the office.  Her diastolic blood pressure is mildly elevated and she is running 130/80s at home.  Dr. Hodges has recommended goal of 120/80s or less.  Her palpitations have improved on her current dose of diltiazem.  She reports some mild shortness of breath, brief palpitations lasting a few seconds intermittently, lower extremity edema, and fatigue.  Her blood pressure that day was 132/88.  I recommend starting spironolactone 25 mg 1 tablet daily.  She was in a normal sinus rhythm and it was noted that she had declined anticoagulation in the past.      In 2021, her follow-up CMP was normal.  Patient reported that she continued to have palpitations in the morning  "and she was concerned about being on olmesartan and spironolactone.  Her blood pressures are ranging from 128//91 with heart rates in the 60s and 80s.  She had just worn a Holter monitor in May 2020 and I said if she had new symptoms that we could order another Holter monitor.  I also recommended increasing the diltiazem to 360 mg at nighttime.  She did not feel comfortable changing her medications.    She presents today for discussion of her blood pressure medications.  Since starting the spironolactone, she has noticed breast enlargement, breast soreness, and a dry mouth.  Her blood pressures are better controlled at 120-130s/70s.  She does not feel that she ever had lower extremity edema, just \"big legs\".  Her palpitations have improved.  She denies chest pain, shortness of breath, PND, orthopnea, dizziness, syncope, or bleeding.    Past Medical History:   Diagnosis Date   • Abnormal stress test     Stress echo on 8/21/14 showed hypokinesis of the mid anteroseptum and distal anterior wall.   • Allergy to IVP dye     Severe itching   • Atherosclerosis     Atherosclerosis of abdominal aorta   • CAD (coronary artery disease) 09/2014    Mild, nonobstructive   • Gastric ulcer    • GERD (gastroesophageal reflux disease)    • Hemangioma     Hemangioma of liver (right lobe)   • Hyperlipidemia    • Hypertension    • Hypothyroidism     History of Hashimoto's and goiter - s/p total thyroidectomy in 11/12   • Migraine    • Mitral valve insufficiency     Moderate to severe by RAUL on 9/4/14.  Mild to moderate by echo on 10/28/15.   • Non-rheumatic tricuspid valve insufficiency    • Obstructive sleep apnea syndrome    • Osteopenia    • PAF (paroxysmal atrial fibrillation) (CMS/HCC)     Afib with RVR during stress echo on 08/21/14.  None on event recorder in 01/2015.   • Vitamin D deficiency disease        Past Surgical History:   Procedure Laterality Date   • CHOLECYSTECTOMY  1992   • EYE SURGERY      cataract surgery   • " THYROID SURGERY  06/07/2012   • UTERINE FIBROID SURGERY      Several uterine ablation surgeries        Social History     Socioeconomic History   • Marital status:      Spouse name: Not on file   • Number of children: Not on file   • Years of education: Not on file   • Highest education level: Not on file   Tobacco Use   • Smoking status: Never Smoker   • Smokeless tobacco: Never Used   • Tobacco comment: caffeine:    Substance and Sexual Activity   • Alcohol use: Not Currently     Comment: Occ//Caffeine use: 2-3 cups daily   • Drug use: No   • Sexual activity: Defer       Family History   Problem Relation Age of Onset   • Goiter Mother    • Pancreatic cancer Mother    • Heart failure Father    • Hypertension Father    • Diabetes Sister    • Coronary artery disease Sister         Sister with 3 vessel CABG at age 62   • Heart disease Sister    • No Known Problems Maternal Grandmother    • No Known Problems Maternal Grandfather    • Diabetes Paternal Grandmother    • No Known Problems Paternal Grandfather        The following portion of the patient's history were reviewed and updated as appropriate: past medical history, past surgical history, past social history, past family history, allergies, current medications, and problem list.    Review of Systems   Constitutional: Negative for weight gain.   Cardiovascular: Positive for palpitations. Negative for dyspnea on exertion and leg swelling.   Respiratory: Negative.    Musculoskeletal: Positive for joint pain.   Neurological: Negative.        Allergies   Allergen Reactions   • Iodinated Diagnostic Agents Itching   • Iodine Itching   • Tartrazine Itching     Cat scan dye   • Hydrochlorothiazide Other (See Comments)     weakness   • Spironolactone Other (See Comments)     Breast pain/enlargement, soreness, dry mouth         Current Outpatient Medications:   •  aspirin 325 MG tablet, Take 325 mg by mouth Daily., Disp: , Rfl:   •  Cholecalciferol (VITAMIN D) 2000  "units capsule, Take 4,000 Units by mouth Daily., Disp: , Rfl:   •  coenzyme Q10 100 MG capsule, Take 200 mg by mouth Every Night., Disp: , Rfl:   •  dilTIAZem CD (CARDIZEM CD) 300 MG 24 hr capsule, Take 1 capsule by mouth Daily., Disp: 90 capsule, Rfl: 3  •  levothyroxine (SYNTHROID) 150 MCG tablet, Take 1 tablet by mouth Daily. Wed and Sunday extra half, Disp: , Rfl:   •  olmesartan (BENICAR) 40 MG tablet, Take 40 mg by mouth Daily., Disp: , Rfl:   •  rosuvastatin (CRESTOR) 20 MG tablet, TAKE 1 TABLET DAILY, Disp: 90 tablet, Rfl: 1  •  vitamin B-12 (CYANOCOBALAMIN) 500 MCG tablet, Take 500 mcg by mouth Daily., Disp: , Rfl:   •  chlorthalidone (HYGROTON) 25 MG tablet, Take 1 tablet by mouth Daily., Disp: 30 tablet, Rfl: 1        Objective:     Vitals:    08/09/21 0810   BP: 140/84   Pulse: 78   Weight: 107 kg (235 lb)   Height: 157.5 cm (62\")     Body mass index is 42.98 kg/m².    PHYSICAL EXAM:    Vitals Reviewed.   General Appearance: No acute distress, well developed and well nourished. Obese.   Eyes: Conjunctiva and lids: No erythema, swelling, or discharge. Sclera non-icteric.   HENT: Atraumatic, normocephalic. External eyes, ears, and nose normal. No hearing loss noted. Mucous membranes normal. Lips not cyanotic. Neck supple with no tenderness.  Respiratory: No signs of respiratory distress. Respiration rhythm and depth normal.   Clear to auscultation. No rales, crackles, rhonchi, or wheezing auscultated.   Cardiovascular:  Jugular Venous Pressure: Normal  Heart Rate and Rhythm: Normal, Heart Sounds: Normal S1 and S2. No S3 or S4 noted.  Murmurs: No murmurs noted. No rubs, thrills, or gallops.   Lower Extremities: No lower extremity edema noted.  Gastrointestinal:  Abdomen soft, non-distended, non-tender. Normal bowel sounds.    Musculoskeletal: Normal movement of extremities  Skin and Nails: General appearance normal. No pallor, cyanosis, diaphoresis. Skin temperature normal. No clubbing of fingernails. "   Psychiatric: Patient alert and oriented to person, place, and time. Speech and behavior appropriate. Normal mood and affect.       ECG 12 Lead    Date/Time: 8/9/2021 8:16 AM  Performed by: Lanette Sellers APRN  Authorized by: Lanette Sellers APRN   Comparison: compared with previous ECG from 6/23/2021  Similar to previous ECG  Rhythm: sinus rhythm  Rate: normal  BPM: 78  Conduction: conduction normal  ST Segments: ST segments normal  T Waves: T waves normal  QRS axis: normal  Other findings: left atrial abnormality    Clinical impression: non-specific ECG              Assessment:       Diagnosis Plan   1. Essential hypertension  Basic Metabolic Panel   2. Diastolic dysfunction     3. Paroxysmal atrial fibrillation (CMS/[facility])     4. Obstructive sleep apnea     5. Body mass index (BMI) of 40.0-44.9 in adult (CMS/[facility])            Plan:       1.  Hypertension: Blood pressure better controlled on spironolactone now running 120-130s/70s at home.  Unfortunately, she developed breast enlargement, breast soreness, dry mouth.  We will discontinued the spironolactone.  I recommended increasing the diltiazem to 360 mg daily, changing the diltiazem to 180 mg twice daily, or adding chlorthalidone.  She opted for chlorthalidone 25 mg 1 tablet daily.  Repeat BMP in 1 week and call me with an update on home blood pressure readings in 1 month.    2.  Diastolic Dysfunction: Denies shortness of breath or lower extremity edema.    3.  Paroxysmal Atrial Fibrillation: She is currently in a normal sinus rhythm.  She occasionally experiences brief palpitations, but nothing sustained and they have improved.  She has opted not to be on anticoagulation and has chosen aspirin only.  She denies any strokelike symptoms.  If her palpitations increase in severity, duration, or frequency, I asked her to call the office.    4.  Obstructive Sleep Apnea: Compliant with CPAP machine.    5.  Obesity: BMI 43. she says she really wants to lose  weight.    6.  I will follow-up with her about the BMP results and she will call me in 1 month with an update on her blood pressure readings.      As always, it has been a pleasure to participate in your patient's care. Thank you.       Sincerely,       MIMI Pineda  [facility] Cardiology      · COVID-19 Precautions - Patient was compliant in wearing a mask. When I saw the patient, I used appropriate personal protective equipment (PPE) including mask and eye shield (standard procedure).  Additionally, I used gown and gloves if indicated.  Hand hygiene was completed before and after seeing the patient.  · Dictated utilizing Dragon Dictation  I spent 30 minutes reviewing her medical records/testing/previous office notes/labs, face-to-face interaction with patient, physical examination, formulating the plan of care, and discussion of plan of care with patient.

## 2021-08-24 ENCOUNTER — LAB (OUTPATIENT)
Dept: LAB | Facility: HOSPITAL | Age: 71
End: 2021-08-24

## 2021-08-24 ENCOUNTER — TELEPHONE (OUTPATIENT)
Dept: CARDIOLOGY | Facility: CLINIC | Age: 71
End: 2021-08-24

## 2021-08-24 DIAGNOSIS — I10 ESSENTIAL HYPERTENSION: ICD-10-CM

## 2021-08-24 LAB
ANION GAP SERPL CALCULATED.3IONS-SCNC: 12.7 MMOL/L (ref 5–15)
BUN SERPL-MCNC: 28 MG/DL (ref 8–23)
BUN/CREAT SERPL: 28.3 (ref 7–25)
CALCIUM SPEC-SCNC: 9 MG/DL (ref 8.6–10.5)
CHLORIDE SERPL-SCNC: 105 MMOL/L (ref 98–107)
CO2 SERPL-SCNC: 23.3 MMOL/L (ref 22–29)
CREAT SERPL-MCNC: 0.99 MG/DL (ref 0.57–1)
GFR SERPL CREATININE-BSD FRML MDRD: 55 ML/MIN/1.73
GLUCOSE SERPL-MCNC: 106 MG/DL (ref 65–99)
POTASSIUM SERPL-SCNC: 3.9 MMOL/L (ref 3.5–5.2)
SODIUM SERPL-SCNC: 141 MMOL/L (ref 136–145)

## 2021-08-24 PROCEDURE — 80048 BASIC METABOLIC PNL TOTAL CA: CPT

## 2021-08-24 PROCEDURE — 36415 COLL VENOUS BLD VENIPUNCTURE: CPT

## 2021-08-24 NOTE — TELEPHONE ENCOUNTER
Please call patient.  BMP checked because she just started chlorthalidone 25 mg daily.  Glucose mildly abnormal at 106.  BUN mildly elevated at 28.  I would encourage her just to drink a little bit more fluid throughout the daytime.  Potassium stable, but she could eat more potassium rich foods.      Please ask her to call me with an update on her blood pressure and pulse in the next 1 to 2 weeks unless she wants to tell you now.      Thank you so much.

## 2021-08-26 NOTE — TELEPHONE ENCOUNTER
Please schedule patient to see me next week and bring her blood machine so I can double check it for accuracy. Thanks.

## 2021-09-29 ENCOUNTER — IMMUNIZATION (OUTPATIENT)
Dept: VACCINE CLINIC | Facility: HOSPITAL | Age: 71
End: 2021-09-29

## 2021-09-29 PROCEDURE — 91300 HC SARSCOV02 VAC 30MCG/0.3ML IM: CPT | Performed by: INTERNAL MEDICINE

## 2021-09-29 PROCEDURE — 0003A: CPT | Performed by: INTERNAL MEDICINE

## 2021-09-29 PROCEDURE — 0001A: CPT | Performed by: INTERNAL MEDICINE

## 2021-11-29 RX ORDER — DILTIAZEM HYDROCHLORIDE 300 MG/1
CAPSULE, COATED, EXTENDED RELEASE ORAL
Qty: 90 CAPSULE | Refills: 0 | Status: SHIPPED | OUTPATIENT
Start: 2021-11-29 | End: 2022-02-08 | Stop reason: SDUPTHER

## 2022-01-27 ENCOUNTER — LAB (OUTPATIENT)
Dept: LAB | Facility: HOSPITAL | Age: 72
End: 2022-01-27

## 2022-01-27 ENCOUNTER — TRANSCRIBE ORDERS (OUTPATIENT)
Dept: ADMINISTRATIVE | Facility: HOSPITAL | Age: 72
End: 2022-01-27

## 2022-01-27 DIAGNOSIS — E89.0 POSTSURGICAL HYPOTHYROIDISM: ICD-10-CM

## 2022-01-27 DIAGNOSIS — R68.89 MECHANICAL AND MOTOR PROBLEMS WITH INTERNAL ORGANS: ICD-10-CM

## 2022-01-27 DIAGNOSIS — I10 ESSENTIAL HYPERTENSION, MALIGNANT: ICD-10-CM

## 2022-01-27 DIAGNOSIS — E78.2 MIXED HYPERLIPIDEMIA: Primary | ICD-10-CM

## 2022-01-27 DIAGNOSIS — E78.2 MIXED HYPERLIPIDEMIA: ICD-10-CM

## 2022-01-27 LAB
ALBUMIN SERPL-MCNC: 4.5 G/DL (ref 3.5–5.2)
ALBUMIN/GLOB SERPL: 2 G/DL
ALP SERPL-CCNC: 87 U/L (ref 39–117)
ALT SERPL W P-5'-P-CCNC: 20 U/L (ref 1–33)
ANION GAP SERPL CALCULATED.3IONS-SCNC: 12.5 MMOL/L (ref 5–15)
AST SERPL-CCNC: 14 U/L (ref 1–32)
BILIRUB SERPL-MCNC: 0.9 MG/DL (ref 0–1.2)
BUN SERPL-MCNC: 10 MG/DL (ref 8–23)
BUN/CREAT SERPL: 13.3 (ref 7–25)
CALCIUM SPEC-SCNC: 9.6 MG/DL (ref 8.6–10.5)
CHLORIDE SERPL-SCNC: 104 MMOL/L (ref 98–107)
CO2 SERPL-SCNC: 25.5 MMOL/L (ref 22–29)
CREAT SERPL-MCNC: 0.75 MG/DL (ref 0.57–1)
GFR SERPL CREATININE-BSD FRML MDRD: 76 ML/MIN/1.73
GLOBULIN UR ELPH-MCNC: 2.2 GM/DL
GLUCOSE SERPL-MCNC: 102 MG/DL (ref 65–99)
POTASSIUM SERPL-SCNC: 4 MMOL/L (ref 3.5–5.2)
PROT SERPL-MCNC: 6.7 G/DL (ref 6–8.5)
SODIUM SERPL-SCNC: 142 MMOL/L (ref 136–145)
T3FREE SERPL-MCNC: 3.29 PG/ML (ref 2–4.4)
T4 FREE SERPL-MCNC: 1.71 NG/DL (ref 0.93–1.7)
TSH SERPL DL<=0.05 MIU/L-ACNC: 3.46 UIU/ML (ref 0.27–4.2)

## 2022-01-27 PROCEDURE — 84443 ASSAY THYROID STIM HORMONE: CPT

## 2022-01-27 PROCEDURE — 84439 ASSAY OF FREE THYROXINE: CPT

## 2022-01-27 PROCEDURE — 84481 FREE ASSAY (FT-3): CPT

## 2022-01-27 PROCEDURE — 80053 COMPREHEN METABOLIC PANEL: CPT

## 2022-01-27 PROCEDURE — 36415 COLL VENOUS BLD VENIPUNCTURE: CPT

## 2022-02-08 ENCOUNTER — OFFICE VISIT (OUTPATIENT)
Dept: CARDIOLOGY | Facility: CLINIC | Age: 72
End: 2022-02-08

## 2022-02-08 VITALS
HEART RATE: 80 BPM | HEIGHT: 62 IN | WEIGHT: 231 LBS | BODY MASS INDEX: 42.51 KG/M2 | DIASTOLIC BLOOD PRESSURE: 78 MMHG | SYSTOLIC BLOOD PRESSURE: 144 MMHG

## 2022-02-08 DIAGNOSIS — R07.2 PRECORDIAL PAIN: ICD-10-CM

## 2022-02-08 DIAGNOSIS — I51.89 DIASTOLIC DYSFUNCTION: ICD-10-CM

## 2022-02-08 DIAGNOSIS — I48.0 PAROXYSMAL ATRIAL FIBRILLATION: Primary | ICD-10-CM

## 2022-02-08 DIAGNOSIS — E78.2 MIXED HYPERLIPIDEMIA: ICD-10-CM

## 2022-02-08 DIAGNOSIS — I25.118 ATHEROSCLEROSIS OF NATIVE CORONARY ARTERY OF NATIVE HEART WITH STABLE ANGINA PECTORIS: ICD-10-CM

## 2022-02-08 DIAGNOSIS — I10 PRIMARY HYPERTENSION: ICD-10-CM

## 2022-02-08 DIAGNOSIS — G47.33 OBSTRUCTIVE SLEEP APNEA SYNDROME: ICD-10-CM

## 2022-02-08 DIAGNOSIS — R06.09 DYSPNEA ON EXERTION: ICD-10-CM

## 2022-02-08 PROCEDURE — 93000 ELECTROCARDIOGRAM COMPLETE: CPT | Performed by: NURSE PRACTITIONER

## 2022-02-08 PROCEDURE — 99214 OFFICE O/P EST MOD 30 MIN: CPT | Performed by: NURSE PRACTITIONER

## 2022-02-08 RX ORDER — DILTIAZEM HYDROCHLORIDE 300 MG/1
300 CAPSULE, COATED, EXTENDED RELEASE ORAL DAILY
Qty: 90 CAPSULE | Refills: 3 | Status: SHIPPED | OUTPATIENT
Start: 2022-02-08 | End: 2022-02-08 | Stop reason: SDUPTHER

## 2022-02-08 RX ORDER — DILTIAZEM HYDROCHLORIDE 300 MG/1
300 CAPSULE, COATED, EXTENDED RELEASE ORAL DAILY
Qty: 90 CAPSULE | Refills: 3 | Status: SHIPPED | OUTPATIENT
Start: 2022-02-08 | End: 2022-11-16 | Stop reason: SDUPTHER

## 2022-02-08 RX ORDER — LEVOTHYROXINE SODIUM 0.15 MG/1
TABLET ORAL DAILY
COMMUNITY
Start: 2021-12-12

## 2022-02-08 NOTE — PATIENT INSTRUCTIONS
Take diltiazem, olmesartan, and aspirin 81 mg daily in AM  Xarelto with dinner    Schedule stress test and echocardiogram

## 2022-02-08 NOTE — PROGRESS NOTES
Date of Office Visit: 2022  Encounter Provider: MIMI Shelton  Place of Service: [facility] CARDIOLOGY  Patient Name: Liza Mcnair  :1950  Primary Cardiologist: Dr. Kelly Hodges     Chief Complaint   Patient presents with   • Hypertension   • Atrial Fibrillation   :     HPI: Liza Mcnair is a pleasant 71 y.o. female who presents today for follow-up on recent atrial fibrillation.  I have reviewed her medical records.     She has been diagnosed with hypertension, hyperlipidemia, hypothyroidism, and obesity.  In 2014, she had a Myoview stress test which was abnormal and also developed atrial fibrillation during the study.  She then underwent coronary angiography and was diagnosed with mild, nonobstructive coronary artery disease.  In 2015, event recorder was normal.    In 2018, echocardiogram showed normal LVEF and grade 1 diastolic dysfunction.  In 2019, she reported dyspnea and decreased stamina.  Nuclear stress test showed a mild abnormality in which Dr. Hodges felt this was overall low risk study and medical treatment was recommended.  She has had brief palpitations over the the years and her diltiazem was increased.  In May 2020, she wore a 14-day Holter monitor which was normal.    In 2021, she had the Pfizer booster vaccine and states that she had a severe reaction with left ear pain, malaise, muscle aches, joint pain, periorbital edema, right jaw swelling, and felt miserable.      In 2022, she developed left periorbital swelling and fever. She went to the urgent care center.  She was told that her blood pressure was elevated and was getting higher readings at home.  She says her blood pressure machine at home says that she was in atrial fibrillation. In the beginning of January, she had a TSH level drawn and it was normal.    On 2022, she followed up with her PCP.  She was taking chlorthalidone  half tablet on Monday, Wednesdays, Fridays, Saturdays.  She was taking the olmesartan 40 mg every morning and diltiazem 300 mg in the evening.  She no longer wanted to take the spironolactone and stop the medication.  Her blood pressure in the office that day was 168/92 and heart rate was 100.  EKG was obtained which showed atrial fibrillation.  Her PCP started her on rivaroxaban 20 mg daily for [id number] score of 3.  He recommended that she continue with the baby aspirin.  She was recommended to follow-up with cardiology.     She presents today for evaluation. Her blood pressure is borderline elevated and she is in a normal sinus rhythm.  Occasionally she experiences a brief palpitation.  She has developed worsening dyspnea with exertion and now is experiencing midsternal chest pressure with exertional activities that resolves with rest.  She has had a couple episodes of paroxysmal nocturnal dyspnea.  She denies orthopnea, cough, edema, dizziness, syncope, or bleeding.  She has been diagnosed with mild to moderate obstructive sleep apnea and does not use a CPAP machine.  She was recommended weight loss at that time and had lost weight.      Past Medical History:   Diagnosis Date   • Abnormal stress test     Stress echo on 8/21/14 showed hypokinesis of the mid anteroseptum and distal anterior wall.   • Allergy to IVP dye     Severe itching   • Atherosclerosis     Atherosclerosis of abdominal aorta   • CAD (coronary artery disease) 09/2014    Mild, nonobstructive   • Gastric ulcer    • GERD (gastroesophageal reflux disease)    • Hemangioma     Hemangioma of liver (right lobe)   • Hyperlipidemia    • Hypertension    • Hypothyroidism     History of Hashimoto's and goiter - s/p total thyroidectomy in 11/12   • Migraine    • Mitral valve insufficiency     Moderate to severe by RAUL on 9/4/14.  Mild to moderate by echo on 10/28/15.   • Non-rheumatic tricuspid valve insufficiency    • Obstructive sleep apnea syndrome    •  Osteopenia    • PAF (paroxysmal atrial fibrillation) (HCC)     Afib with RVR during stress echo on 08/21/14.  None on event recorder in 01/2015.   • Vitamin D deficiency disease        Past Surgical History:   Procedure Laterality Date   • CHOLECYSTECTOMY  1992   • EYE SURGERY      cataract surgery   • THYROID SURGERY  06/07/2012   • UTERINE FIBROID SURGERY      Several uterine ablation surgeries        Social History     Socioeconomic History   • Marital status:    Tobacco Use   • Smoking status: Never Smoker   • Smokeless tobacco: Never Used   Substance and Sexual Activity   • Alcohol use: Not Currently     Comment: Caffeine use: 2 cups daily   • Drug use: No   • Sexual activity: Defer       Family History   Problem Relation Age of Onset   • Goiter Mother    • Pancreatic cancer Mother    • Heart failure Father    • Hypertension Father    • Diabetes Sister    • Coronary artery disease Sister         Sister with 3 vessel CABG at age 62   • Heart disease Sister    • No Known Problems Maternal Grandmother    • No Known Problems Maternal Grandfather    • Diabetes Paternal Grandmother    • No Known Problems Paternal Grandfather        The following portion of the patient's history were reviewed and updated as appropriate: past medical history, past surgical history, past social history, past family history, allergies, current medications, and problem list.    Review of Systems   Constitutional: Negative for weight gain.   Cardiovascular: Positive for chest pain, dyspnea on exertion and palpitations. Negative for leg swelling.   Respiratory: Positive for shortness of breath.    Musculoskeletal: Positive for joint pain.   Neurological: Negative.        Allergies   Allergen Reactions   • Iodinated Diagnostic Agents Itching   • Iodine Itching   • Tartrazine Itching     Cat scan dye   • Hydrochlorothiazide Other (See Comments)     weakness   • Spironolactone Other (See Comments)     Breast pain/enlargement, soreness,  "dry mouth         Current Outpatient Medications:   •  Cholecalciferol (VITAMIN D) 2000 units capsule, Take 4,000 Units by mouth Daily., Disp: , Rfl:   •  coenzyme Q10 100 MG capsule, Take 200 mg by mouth Every Night., Disp: , Rfl:   •  dilTIAZem CD (CARDIZEM CD) 300 MG 24 hr capsule, Take 1 capsule by mouth Daily., Disp: 90 capsule, Rfl: 3  •  olmesartan (BENICAR) 40 MG tablet, Take 40 mg by mouth Daily., Disp: , Rfl:   •  rivaroxaban (XARELTO) 20 MG tablet, Take 20 mg by mouth Daily., Disp: , Rfl:   •  rosuvastatin (CRESTOR) 20 MG tablet, TAKE 1 TABLET DAILY, Disp: 90 tablet, Rfl: 1  •  Synthroid 112 MCG tablet, , Disp: , Rfl:   •  vitamin B-12 (CYANOCOBALAMIN) 500 MCG tablet, Take 500 mcg by mouth Daily., Disp: , Rfl:         Objective:     Vitals:    02/08/22 0909 02/08/22 0924   BP: 142/84 144/78   BP Location: Left arm Right arm   Pulse: 80    Weight: 105 kg (231 lb)    Height: 157.5 cm (62\")      Body mass index is 42.25 kg/m².    PHYSICAL EXAM:    Vitals Reviewed.   General Appearance: No acute distress, well developed and well nourished. Obese.   Eyes: Conjunctiva and lids: No erythema, swelling, or discharge. Sclera non-icteric.   HENT: Atraumatic, normocephalic. External eyes, ears, and nose normal. No hearing loss noted. Mucous membranes normal. Lips not cyanotic. Neck supple with no tenderness.  Wearing a mask.  Respiratory: No signs of respiratory distress. Respiration rhythm and depth normal.   Clear to auscultation. No rales, crackles, rhonchi, or wheezing auscultated.   Cardiovascular:  Jugular Venous Pressure: Normal  Heart Rate and Rhythm: Normal, Heart Sounds: Normal S1 and S2. No S3 or S4 noted.  Murmurs: No murmurs noted. No rubs, thrills, or gallops.   Lower Extremities: No lower extremity edema noted.  Gastrointestinal:  Abdomen soft, non-distended, non-tender. Normal bowel sounds.    Musculoskeletal: Normal movement of extremities  Skin and Nails: General appearance normal. No pallor, " cyanosis, diaphoresis. Skin temperature normal. No clubbing of fingernails.   Psychiatric: Patient alert and oriented to person, place, and time. Speech and behavior appropriate. Normal mood and affect.       ECG 12 Lead    Date/Time: 2/8/2022 9:12 AM  Performed by: Lanette Sellers APRN  Authorized by: Lanette Sellers APRN   Comparison: compared with previous ECG from 1/21/2022  Comparison to previous ECG: Atrial fibrillation  Rhythm: sinus rhythm  Rate: normal  BPM: 80  Conduction: conduction normal  ST Segments: ST segments normal  T Waves: T waves normal  QRS axis: normal    Clinical impression: normal ECG              Assessment:       Diagnosis Plan   1. Paroxysmal atrial fibrillation (HCC)  Adult Transthoracic Echo Complete W/ Cont if Necessary Per Protocol    Stress Test With Pet Myocardial Perfusion (MULTI STUDY, REST AND STRESS)   2. Precordial pain  Adult Transthoracic Echo Complete W/ Cont if Necessary Per Protocol    Stress Test With Pet Myocardial Perfusion (MULTI STUDY, REST AND STRESS)   3. Dyspnea on exertion  Adult Transthoracic Echo Complete W/ Cont if Necessary Per Protocol    Stress Test With Pet Myocardial Perfusion (MULTI STUDY, REST AND STRESS)   4. Atherosclerosis of native coronary artery of native heart with stable angina pectoris (HCC)     5. Diastolic dysfunction     6. Primary hypertension     7. Mixed hyperlipidemia     8. Obstructive sleep apnea syndrome     9. Body mass index (BMI) of 40.0-44.9 in adult (HCC)            Plan:       1.  Paroxysmal Atrial fibrillation: First diagnosed in 2014 during a stress test.  She has had intermittent palpitations over the years, but nothing sustained.  She recently saw her PCP and had 2 EKGs in the office which confirmed atrial fibrillation.  She was started on rivaroxaban 20 mg daily and was recommended continue with diltiazem.  Her TSH level was normal.  She is unable to wear a monitor because she is allergic to the patches, even a Zio patch  monitor patch.  I recommended that she continue with both medications.    Atrial Fibrillation and Atrial Flutter  Assessment  • The patient has paroxysmal atrial fibrillation  • The patient's CHADS2-VASc score is 4  • A SCQ9CL2-PLCa score of 2 or more is considered a high risk for a thromboembolic event  • Rivaroxaban prescribed    2/3/4/5.  Chest Pain and Dyspnea: She has been experiencing midsternal chest pressure and dyspnea that occurs with exertion and resolves with rest.  She has a history of nonocclusive atherosclerosis per coronary angiography and diastolic dysfunction per echocardiogram.  I recommended a cardiac PET scan and 2D echocardiogram.  I recommended that she restart the aspirin 81 mg daily.    6.  Hypertension: Blood pressure borderline elevated today.  She says that her endocrinology office the other day it was in the 120 systolic.  We will continue to monitor.  She was intolerant to spironolactone due to mastalgia.  She has no longer taking the chlorthalidone that was previously recommended.    7.  Hyperlipidemia: Remains on rosuvastatin.    8.  Obstructive Sleep Apnea: She said she was diagnosed over 16 years with mild to moderate obstructive sleep apnea and does not use a CPAP machine.  She does not want a referral to sleep medicine.    9.  Obesity: BMI is 42.2.    10.  Further recommendations to follow after cardiac testing is completed.    ADDENDUM 2/21/2022:    · Echocardiogram normal except trace to mild mitral regurgitation.    · Cardiac PET scan normal.    · I spoke with patient via telephone about results.  She denies chest pain, but continues to experience dyspnea with exertion.    · She has been in atrial fibrillation which has been rate controlled.  She was scheduled to see Dr. Hodges in March and this appointment has been canceled.  She needs to be rescheduled.    · She informs me that she may also need to have a cystoscopy completed.      As always, it has been a pleasure to  participate in your patient's care. Thank you.       Sincerely,       MIMI Pineda  [facility] Cardiology      · COVID-19 Precautions - Patient was compliant in wearing a mask. When I saw the patient, I used appropriate personal protective equipment (PPE) including mask and eye shield (standard procedure).  Additionally, I used gown and gloves if indicated.  Hand hygiene was completed before and after seeing the patient.  · Dictated utilizing Dragon Dictation  I spent 40 minutes reviewing her medical records/testing/previous office notes/labs, face-to-face interaction with patient, physical examination, formulating the plan of care, and discussion of plan of care with patient.

## 2022-02-14 ENCOUNTER — TELEPHONE (OUTPATIENT)
Dept: CARDIOLOGY | Facility: CLINIC | Age: 72
End: 2022-02-14

## 2022-02-14 NOTE — TELEPHONE ENCOUNTER
Lanette,    Does patient need to hold Xarelto? Do you have any recommendations for her?    Thank you,    Yesenia Hernandez RN  Triage [facility]

## 2022-02-14 NOTE — TELEPHONE ENCOUNTER
Patient called and stated that she was started on Xarelto on 2/8/22.  She stated that over the weekend that she noticed blood in her urine.  She stated that it would become very dark a few hours after she took the medication and the it would taper off as the day went on.  She stated that she is not having any urinary symptoms.  She stated that she is feeling fine.  She wanted to know if she should be concerned or just continue on with the medications.  Please advise.    CB: [phone]    Thanks,  Judi

## 2022-02-14 NOTE — TELEPHONE ENCOUNTER
She having blood in her urine today?  If not, continue the blood thinner.  She also needs to call her PCP and have a urine analysis completed.  Please ask her to call after she sees her PCP with an update.  Thank you.

## 2022-02-15 NOTE — TELEPHONE ENCOUNTER
Notified pt. She is going to try to get to urgent care today.    Thank you,    Yesenia Hernandez RN  Triage [facility] – [place], [place]

## 2022-02-15 NOTE — TELEPHONE ENCOUNTER
Lanette    Pt called her PCP. They won't be able to get a UA until Thursday. Is it ok for her to wait until then?    Thank you,    Yesenia Hernandez RN  Triage [facility]

## 2022-02-15 NOTE — TELEPHONE ENCOUNTER
Pt called back. Went over your recommendations. She is still having blood in her urine. She said sometimes it's brown. She is going to call Dr. Das today. She will update us afterwards.    Thank you,    Yesenia Hernandez RN  Triage [facility] [place]

## 2022-02-18 ENCOUNTER — HOSPITAL ENCOUNTER (OUTPATIENT)
Dept: CARDIOLOGY | Facility: HOSPITAL | Age: 72
Discharge: HOME | End: 2022-02-18

## 2022-02-18 ENCOUNTER — TELEPHONE (OUTPATIENT)
Dept: CARDIOLOGY | Facility: CLINIC | Age: 72
End: 2022-02-18

## 2022-02-18 VITALS
HEART RATE: 75 BPM | SYSTOLIC BLOOD PRESSURE: 166 MMHG | DIASTOLIC BLOOD PRESSURE: 78 MMHG | BODY MASS INDEX: 42.51 KG/M2 | OXYGEN SATURATION: 97 % | WEIGHT: 231 LBS | HEIGHT: 62 IN

## 2022-02-18 DIAGNOSIS — R06.09 DYSPNEA ON EXERTION: ICD-10-CM

## 2022-02-18 DIAGNOSIS — I48.0 PAROXYSMAL ATRIAL FIBRILLATION: ICD-10-CM

## 2022-02-18 DIAGNOSIS — R07.2 PRECORDIAL PAIN: ICD-10-CM

## 2022-02-18 LAB
BH CV NUCLEAR PRIOR STUDY: 2
BH CV REST NUCLEAR ISOTOPE DOSE: 57.8 MCI
BH CV STRESS BP STAGE 1: NORMAL
BH CV STRESS COMMENTS STAGE 1: NORMAL
BH CV STRESS DOSE REGADENOSON STAGE 1: 0.4
BH CV STRESS DURATION MIN STAGE 1: 0
BH CV STRESS DURATION SEC STAGE 1: 10
BH CV STRESS HR STAGE 1: 97
BH CV STRESS NUCLEAR ISOTOPE DOSE: 57.8 MCI
BH CV STRESS PROTOCOL 1: NORMAL
BH CV STRESS RECOVERY BP: NORMAL MMHG
BH CV STRESS RECOVERY HR: 83 BPM
BH CV STRESS STAGE 1: 1
LV EF NUC BP: 80 %
MAXIMAL PREDICTED HEART RATE: 148 BPM
PERCENT MAX PREDICTED HR: 65.54 %
STRESS BASELINE BP: NORMAL MMHG
STRESS BASELINE HR: 69 BPM
STRESS PERCENT HR: 77 %
STRESS POST EXERCISE DUR SEC: 10 SEC
STRESS POST PEAK BP: NORMAL MMHG
STRESS POST PEAK HR: 97 BPM
STRESS TARGET HR: 126 BPM

## 2022-02-18 PROCEDURE — 25010000002 PERFLUTREN (DEFINITY) 8.476 MG IN SODIUM CHLORIDE (PF) 0.9 % 10 ML INJECTION: Performed by: NURSE PRACTITIONER

## 2022-02-18 PROCEDURE — 93017 CV STRESS TEST TRACING ONLY: CPT

## 2022-02-18 PROCEDURE — 93306 TTE W/DOPPLER COMPLETE: CPT

## 2022-02-18 PROCEDURE — A9555 RB82 RUBIDIUM: HCPCS | Performed by: NURSE PRACTITIONER

## 2022-02-18 PROCEDURE — 93306 TTE W/DOPPLER COMPLETE: CPT | Performed by: INTERNAL MEDICINE

## 2022-02-18 PROCEDURE — 93018 CV STRESS TEST I&R ONLY: CPT | Performed by: INTERNAL MEDICINE

## 2022-02-18 PROCEDURE — 78492 MYOCRD IMG PET MLT RST&STRS: CPT | Performed by: INTERNAL MEDICINE

## 2022-02-18 PROCEDURE — 0 RUBIDIUM CHLORIDE: Performed by: NURSE PRACTITIONER

## 2022-02-18 PROCEDURE — 25010000002 REGADENOSON 0.4 MG/5ML SOLUTION: Performed by: NURSE PRACTITIONER

## 2022-02-18 PROCEDURE — 93016 CV STRESS TEST SUPVJ ONLY: CPT | Performed by: INTERNAL MEDICINE

## 2022-02-18 PROCEDURE — 78492 MYOCRD IMG PET MLT RST&STRS: CPT

## 2022-02-18 RX ADMIN — REGADENOSON 0.4 MG: 0.08 INJECTION, SOLUTION INTRAVENOUS at 11:17

## 2022-02-18 RX ADMIN — PERFLUTREN 1.5 ML: 6.52 INJECTION, SUSPENSION INTRAVENOUS at 11:00

## 2022-02-18 NOTE — TELEPHONE ENCOUNTER
Echocardiogram normal except for trace to mild mitral valve regurgitation. Cardiac PET scan normal.    I will reassess if she still having chest pain and dyspnea. She is scheduled to see Dr. Hodges in March.    I called and she did not answer. I left a message stating that we will call her next week with results.

## 2022-02-18 NOTE — TELEPHONE ENCOUNTER
Patient called and stated she could not get to the phone fast enough.  She was just returning your call.    Judi

## 2022-02-21 LAB
AORTIC ARCH: 3.7 CM
ASCENDING AORTA: 3.2 CM
BH CV ECHO MEAS - ACS: 1.9 CM
BH CV ECHO MEAS - AO MAX PG (FULL): 3.4 MMHG
BH CV ECHO MEAS - AO MAX PG: 8.4 MMHG
BH CV ECHO MEAS - AO MEAN PG (FULL): 1.2 MMHG
BH CV ECHO MEAS - AO MEAN PG: 4 MMHG
BH CV ECHO MEAS - AO ROOT AREA (BSA CORRECTED): 1.6
BH CV ECHO MEAS - AO ROOT AREA: 8.1 CM^2
BH CV ECHO MEAS - AO ROOT DIAM: 3.2 CM
BH CV ECHO MEAS - AO V2 MAX: 145.2 CM/SEC
BH CV ECHO MEAS - AO V2 MEAN: 93.4 CM/SEC
BH CV ECHO MEAS - AO V2 VTI: 27.6 CM
BH CV ECHO MEAS - ASC AORTA: 3.2 CM
BH CV ECHO MEAS - AVA(I,A): 2.6 CM^2
BH CV ECHO MEAS - AVA(I,D): 2.6 CM^2
BH CV ECHO MEAS - AVA(V,A): 2.2 CM^2
BH CV ECHO MEAS - AVA(V,D): 2.2 CM^2
BH CV ECHO MEAS - BSA(HAYCOCK): 2.2 M^2
BH CV ECHO MEAS - BSA: 2 M^2
BH CV ECHO MEAS - BZI_BMI: 42.3 KILOGRAMS/M^2
BH CV ECHO MEAS - BZI_METRIC_HEIGHT: 157.5 CM
BH CV ECHO MEAS - BZI_METRIC_WEIGHT: 104.8 KG
BH CV ECHO MEAS - EDV(MOD-SP2): 93 ML
BH CV ECHO MEAS - EDV(MOD-SP4): 116 ML
BH CV ECHO MEAS - EDV(TEICH): 88.7 ML
BH CV ECHO MEAS - EF(CUBED): 77.1 %
BH CV ECHO MEAS - EF(MOD-BP): 63 %
BH CV ECHO MEAS - EF(MOD-SP2): 63.4 %
BH CV ECHO MEAS - EF(MOD-SP4): 61.2 %
BH CV ECHO MEAS - EF(TEICH): 69.4 %
BH CV ECHO MEAS - ESV(MOD-SP2): 34 ML
BH CV ECHO MEAS - ESV(MOD-SP4): 45 ML
BH CV ECHO MEAS - ESV(TEICH): 27.1 ML
BH CV ECHO MEAS - FS: 38.9 %
BH CV ECHO MEAS - IVS/LVPW: 1
BH CV ECHO MEAS - IVSD: 0.95 CM
BH CV ECHO MEAS - LAT PEAK E' VEL: 6.5 CM/SEC
BH CV ECHO MEAS - LV DIASTOLIC VOL/BSA (35-75): 57.1 ML/M^2
BH CV ECHO MEAS - LV MASS(C)D: 136.4 GRAMS
BH CV ECHO MEAS - LV MASS(C)DI: 67.1 GRAMS/M^2
BH CV ECHO MEAS - LV MAX PG: 5.1 MMHG
BH CV ECHO MEAS - LV MEAN PG: 2.8 MMHG
BH CV ECHO MEAS - LV SYSTOLIC VOL/BSA (12-30): 22.1 ML/M^2
BH CV ECHO MEAS - LV V1 MAX: 112.7 CM/SEC
BH CV ECHO MEAS - LV V1 MEAN: 80 CM/SEC
BH CV ECHO MEAS - LV V1 VTI: 26 CM
BH CV ECHO MEAS - LVIDD: 4.4 CM
BH CV ECHO MEAS - LVIDS: 2.7 CM
BH CV ECHO MEAS - LVLD AP2: 7 CM
BH CV ECHO MEAS - LVLD AP4: 7.2 CM
BH CV ECHO MEAS - LVLS AP2: 6.3 CM
BH CV ECHO MEAS - LVLS AP4: 6.2 CM
BH CV ECHO MEAS - LVOT AREA (M): 2.8 CM^2
BH CV ECHO MEAS - LVOT AREA: 2.8 CM^2
BH CV ECHO MEAS - LVOT DIAM: 1.9 CM
BH CV ECHO MEAS - LVPWD: 0.93 CM
BH CV ECHO MEAS - MED PEAK E' VEL: 6.3 CM/SEC
BH CV ECHO MEAS - MR MAX PG: 96 MMHG
BH CV ECHO MEAS - MR MAX VEL: 490 CM/SEC
BH CV ECHO MEAS - MV A DUR: 0.11 SEC
BH CV ECHO MEAS - MV A MAX VEL: 108.8 CM/SEC
BH CV ECHO MEAS - MV DEC SLOPE: 384.1 CM/SEC^2
BH CV ECHO MEAS - MV DEC TIME: 0.18 SEC
BH CV ECHO MEAS - MV E MAX VEL: 81 CM/SEC
BH CV ECHO MEAS - MV E/A: 0.74
BH CV ECHO MEAS - MV MAX PG: 6.7 MMHG
BH CV ECHO MEAS - MV MEAN PG: 2.4 MMHG
BH CV ECHO MEAS - MV P1/2T MAX VEL: 101.8 CM/SEC
BH CV ECHO MEAS - MV P1/2T: 77.6 MSEC
BH CV ECHO MEAS - MV V2 MAX: 129 CM/SEC
BH CV ECHO MEAS - MV V2 MEAN: 70.4 CM/SEC
BH CV ECHO MEAS - MV V2 VTI: 30.9 CM
BH CV ECHO MEAS - MVA P1/2T LCG: 2.2 CM^2
BH CV ECHO MEAS - MVA(P1/2T): 2.8 CM^2
BH CV ECHO MEAS - MVA(VTI): 2.3 CM^2
BH CV ECHO MEAS - PA ACC TIME: 0.12 SEC
BH CV ECHO MEAS - PA MAX PG (FULL): 1.8 MMHG
BH CV ECHO MEAS - PA MAX PG: 4.6 MMHG
BH CV ECHO MEAS - PA PR(ACCEL): 24.3 MMHG
BH CV ECHO MEAS - PA V2 MAX: 107.2 CM/SEC
BH CV ECHO MEAS - PULM A REVS DUR: 0.11 SEC
BH CV ECHO MEAS - PULM A REVS VEL: 23.1 CM/SEC
BH CV ECHO MEAS - PULM DIAS VEL: 31.3 CM/SEC
BH CV ECHO MEAS - PULM S/D: 1.7
BH CV ECHO MEAS - PULM SYS VEL: 52.3 CM/SEC
BH CV ECHO MEAS - PVA(V,A): 2.4 CM^2
BH CV ECHO MEAS - PVA(V,D): 2.4 CM^2
BH CV ECHO MEAS - QP/QS: 0.77
BH CV ECHO MEAS - RAP SYSTOLE: 3 MMHG
BH CV ECHO MEAS - RV MAX PG: 2.8 MMHG
BH CV ECHO MEAS - RV MEAN PG: 1.7 MMHG
BH CV ECHO MEAS - RV V1 MAX: 84 CM/SEC
BH CV ECHO MEAS - RV V1 MEAN: 62.2 CM/SEC
BH CV ECHO MEAS - RV V1 VTI: 18.4 CM
BH CV ECHO MEAS - RVOT AREA: 3 CM^2
BH CV ECHO MEAS - RVOT DIAM: 2 CM
BH CV ECHO MEAS - RVSP: 25.6 MMHG
BH CV ECHO MEAS - SI(AO): 110.4 ML/M^2
BH CV ECHO MEAS - SI(CUBED): 32.8 ML/M^2
BH CV ECHO MEAS - SI(LVOT): 35.6 ML/M^2
BH CV ECHO MEAS - SI(MOD-SP2): 29 ML/M^2
BH CV ECHO MEAS - SI(MOD-SP4): 34.9 ML/M^2
BH CV ECHO MEAS - SI(TEICH): 30.3 ML/M^2
BH CV ECHO MEAS - SUP REN AO DIAM: 2.1 CM
BH CV ECHO MEAS - SV(AO): 224.3 ML
BH CV ECHO MEAS - SV(CUBED): 66.6 ML
BH CV ECHO MEAS - SV(LVOT): 72.4 ML
BH CV ECHO MEAS - SV(MOD-SP2): 59 ML
BH CV ECHO MEAS - SV(MOD-SP4): 71 ML
BH CV ECHO MEAS - SV(RVOT): 56 ML
BH CV ECHO MEAS - SV(TEICH): 61.6 ML
BH CV ECHO MEAS - TAPSE (>1.6): 2 CM
BH CV ECHO MEAS - TR MAX VEL: 237.5 CM/SEC
BH CV ECHO MEASUREMENTS AVERAGE E/E' RATIO: 12.66
BH CV XLRA - RV BASE: 2.6 CM
BH CV XLRA - RV LENGTH: 6.4 CM
BH CV XLRA - RV MID: 2.2 CM
BH CV XLRA - TDI S': 16.6 CM/SEC
LEFT ATRIUM VOLUME INDEX: 23 ML/M2
MAXIMAL PREDICTED HEART RATE: 148 BPM
SINUS: 3 CM
STJ: 2.8 CM
STRESS TARGET HR: 126 BPM

## 2022-02-21 NOTE — TELEPHONE ENCOUNTER
Monika-patient said she missed your call to reschedule her appointment with Dr. Hodges.  Please call her back.  Thank you

## 2022-02-21 NOTE — TELEPHONE ENCOUNTER
Echocardiogram normal except trace to mild mitral regurgitation.  Cardiac PET scan normal.  I spoke with patient via telephone about results.  She denies chest pain, but continues to experience dyspnea with exertion.  She has been atrial fibrillation which has been rate controlled.  She was scheduled to see Dr. Hodges in March and this appointment has been canceled.  She needs to be rescheduled.  She informs me that she may also need to have a cystoscopy completed.

## 2022-05-11 ENCOUNTER — OFFICE VISIT (OUTPATIENT)
Dept: CARDIOLOGY | Facility: CLINIC | Age: 72
End: 2022-05-11

## 2022-05-11 VITALS
HEIGHT: 62 IN | WEIGHT: 218 LBS | SYSTOLIC BLOOD PRESSURE: 122 MMHG | BODY MASS INDEX: 40.12 KG/M2 | DIASTOLIC BLOOD PRESSURE: 62 MMHG | HEART RATE: 83 BPM | OXYGEN SATURATION: 98 %

## 2022-05-11 DIAGNOSIS — I48.0 PAROXYSMAL ATRIAL FIBRILLATION: Primary | ICD-10-CM

## 2022-05-11 DIAGNOSIS — I51.89 DIASTOLIC DYSFUNCTION: ICD-10-CM

## 2022-05-11 DIAGNOSIS — E78.2 MIXED HYPERLIPIDEMIA: ICD-10-CM

## 2022-05-11 DIAGNOSIS — I10 PRIMARY HYPERTENSION: ICD-10-CM

## 2022-05-11 PROCEDURE — 93000 ELECTROCARDIOGRAM COMPLETE: CPT | Performed by: INTERNAL MEDICINE

## 2022-05-11 PROCEDURE — 99214 OFFICE O/P EST MOD 30 MIN: CPT | Performed by: INTERNAL MEDICINE

## 2022-05-11 RX ORDER — ASPIRIN 81 MG/1
81 TABLET ORAL DAILY
COMMUNITY
End: 2022-05-27

## 2022-05-11 NOTE — PROGRESS NOTES
Date of Office Visit: 2022  Encounter Provider: Kelly Hodges MD  Place of Service: [facility] CARDIOLOGY  Patient Name: Liza Mcnair  :1950      Patient ID:  Liza Mcnair is a 71 y.o. female is here for  followup for atrial fibrillation.        History of Present Illness    She has a history of mild nonobstructive coronary artery disease by cardiac catheterization done 2014.  She had atrial fibrillation induced on a stress study which has never recurred and had a normal event recorder done 2015.  She has hypothyroidism, osteoarthritis, asthma, hypertension and hyperlipidemia.  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  She sees Dr. Cooley for asthma.      She does not smoke or use alcohol.      She had a stress study on 2019 which showed a very small area of reversibility in the inferoapical segment, possibly very slight ischemia, ejection fraction 61%.  Showed normal 4-day monitor done 2020.  She wore a Zio patch monitor for 4 days done 2020.  This was a normal monitor.      She had a nonischemic stress PET study done 2022.  Echocardiogram done 2022 showed ejection fraction of 63% with normal diastolic function, no significant valvular abnormalities.  Labs on 22 showed glucose 102, otherwise normal CMP, free T4 slightly elevated at 1.71 but normal TSH and free T3.    She was diagnosed with atrial fibrillation and started on Xarelto.  She is still here today.  She does not really feel dizziness or chest pain.  She has had no falls or syncope.  She has no orthopnea or PND.  She has no exertional dyspnea.  She has no fevers, chills or cough.  She had some hematuria and saw Dr. Indu Garcia, oncology.  She sent her to see Dr. Ortiz from urology did MRI of the abdomen and pelvis, CT abdomen pelvis and cystoscopy.  No cancer was found.  No infections were  found.  He recommended she come back in a year.    Past Medical History:   Diagnosis Date   • Abnormal stress test     Stress echo on 8/21/14 showed hypokinesis of the mid anteroseptum and distal anterior wall.   • Allergy to IVP dye     Severe itching   • Atherosclerosis     Atherosclerosis of abdominal aorta   • CAD (coronary artery disease) 09/2014    Mild, nonobstructive   • Gastric ulcer    • GERD (gastroesophageal reflux disease)    • Hemangioma     Hemangioma of liver (right lobe)   • Hyperlipidemia    • Hypertension    • Hypothyroidism     History of Hashimoto's and goiter - s/p total thyroidectomy in 11/12   • Migraine    • Mitral valve insufficiency     Moderate to severe by RAUL on 9/4/14.  Mild to moderate by echo on 10/28/15.   • Non-rheumatic tricuspid valve insufficiency    • Obstructive sleep apnea syndrome    • Osteopenia    • PAF (paroxysmal atrial fibrillation) (HCC)     Afib with RVR during stress echo on 08/21/14.  None on event recorder in 01/2015.   • Vitamin D deficiency disease          Past Surgical History:   Procedure Laterality Date   • CHOLECYSTECTOMY  1992   • EYE SURGERY      cataract surgery   • THYROID SURGERY  06/07/2012   • UTERINE FIBROID SURGERY      Several uterine ablation surgeries        Current Outpatient Medications on File Prior to Visit   Medication Sig Dispense Refill   • aspirin 81 MG EC tablet Take 81 mg by mouth Daily.     • Cholecalciferol (VITAMIN D) 2000 units capsule Take 4,000 Units by mouth Daily.     • coenzyme Q10 100 MG capsule Take 200 mg by mouth Every Night.     • dilTIAZem CD (CARDIZEM CD) 300 MG 24 hr capsule Take 1 capsule by mouth Daily. 90 capsule 3   • olmesartan (BENICAR) 40 MG tablet Take 40 mg by mouth Daily.     • rivaroxaban (XARELTO) 20 MG tablet Take 20 mg by mouth Daily.     • rosuvastatin (CRESTOR) 20 MG tablet TAKE 1 TABLET DAILY 90 tablet 1   • Synthroid 112 MCG tablet      • vitamin B-12 (CYANOCOBALAMIN) 500 MCG tablet Take 500 mcg by  "mouth Daily.       No current facility-administered medications on file prior to visit.       Social History     Socioeconomic History   • Marital status:    Tobacco Use   • Smoking status: Never Smoker   • Smokeless tobacco: Never Used   Substance and Sexual Activity   • Alcohol use: Not Currently     Comment: Caffeine use: 2 cups daily   • Drug use: No   • Sexual activity: Defer           ROS    Procedures    ECG 12 Lead    Date/Time: 5/11/2022 2:18 PM  Performed by: Kelly Hodges MD  Authorized by: Kelly Hodges MD   Comparison: compared with previous ECG   Comparison to previous ECG: Now a fib  Rhythm: atrial fibrillation    Clinical impression: abnormal EKG                Objective:      Vitals:    05/11/22 1410   BP: 122/62   Pulse: 83   SpO2: 98%   Weight: 98.9 kg (218 lb)   Height: 157.5 cm (62\")     Body mass index is 39.87 kg/m².    Vitals reviewed.   Constitutional:       General: Not in acute distress.     Appearance: Well-developed. Not diaphoretic.   Eyes:      General: No scleral icterus.     Conjunctiva/sclera: Conjunctivae normal.   HENT:      Head: Normocephalic and atraumatic.   Neck:      Thyroid: No thyromegaly.      Vascular: No carotid bruit or JVD.      Lymphadenopathy: No cervical adenopathy.   Pulmonary:      Effort: Pulmonary effort is normal. No respiratory distress.      Breath sounds: Normal breath sounds. No wheezing. No rhonchi. No rales.   Chest:      Chest wall: Not tender to palpatation.   Cardiovascular:      Normal rate. Irregularly irregular rhythm.      Murmurs: There is no murmur.      No gallop.   Pulses:     Intact distal pulses.   Edema:     Peripheral edema absent.   Abdominal:      General: Bowel sounds are normal. There is no distension or abdominal bruit.      Palpations: Abdomen is soft. There is no abdominal mass.      Tenderness: There is no abdominal tenderness.   Musculoskeletal:         General: No deformity.      Extremities: No clubbing " present.     Cervical back: Neck supple. Skin:     General: Skin is warm and dry. There is no cyanosis.      Coloration: Skin is not pale.      Findings: No rash.   Neurological:      Mental Status: Alert and oriented to person, place, and time.      Cranial Nerves: No cranial nerve deficit.   Psychiatric:         Judgment: Judgment normal.         Lab Review:       Assessment:      Diagnosis Plan   1. Paroxysmal atrial fibrillation (HCC)     2. Diastolic dysfunction     3. Primary hypertension     4. Mixed hyperlipidemia         1. Hypertension, BP goal <120/80.  Well-controlled.  2. Hyperlipidemia, on rosuvastatin.   3. Mild nonobstructive CAD, no angina.   4. H/o PAF, on rivaroxaban.  In atrial fibrillation today.  Her [id number] is 3 giving her 3.2% annual risk of stroke.  Her heart rate is well controlled.  5. LORI.   6. Hypothyroidism to removal of her thyroid, stable on replacement.  7. Hematuria, follows with urology.       Plan:       See Lanette in 6 months, no other testing at this time, overall doing well.

## 2022-05-25 NOTE — PROGRESS NOTES
Subjective   Chief Complaint   Patient presents with   • Hyperlipidemia   • Hypothyroidism   • Hypertension     Est care       History of Present Illness   71 y.o. female presents as a new patient to establish care and discuss painless hematuria,  HTN, hypothyroidism, HLD, Vitamin D deficiency and obesity; formerly followed by Dr. Mcintyre.     Reports cola colored urine in February/March in the last month for which she saw Dr. Tracy. Reports negative non-contrast CT abd/pelvis as well as MRI abdomen and pelvis; follow up advised in one year--she requested 6 months. She has also had evaluation with GYN--Dr. Garcia and Allison. Negative UC with tiny amounts of blood. She is on Xarelto 20 mg daily and advised to continue this at recent visit with Dr. Hodges; she has atrial fibrillation.     P/P, MMG and DXA with Dr. Cooper.    Using her cane more often lately. Advised she needed knee replacement several years ago by Dr. Bharat Suarez. Waited as she was the primary caregiver for her  who was very ioll; he recently passed away and she is considering it.     She sees Dr. Bloom for her thyroid which was removed several years ago. She takes 150 mcg Synthroid daily and on Wednesday and Sundays she takes 1/2 112 mcg tab in addition to her 150 mcg.     H/O CPAP which improved with weight loss. Not using CPAP and stays off her back.      Patient Active Problem List   Diagnosis   • Degeneration of intervertebral disc   • Hyperlipidemia   • Hypothyroidism   • Obstructive sleep apnea syndrome   • Osteoarthritis of knee   • Paroxysmal atrial fibrillation (HCC)   • Vitamin D deficiency   • IFG (impaired fasting glucose)   • Hypertension   • Body mass index (BMI) of 40.0-44.9 in adult (HCC)   • Other fatigue   • Diastolic dysfunction       Allergies   Allergen Reactions   • Iodinated Diagnostic Agents Itching   • Iodine Itching   • Tartrazine Itching     Cat scan dye   • Hydrochlorothiazide Other (See Comments)      weakness   • Spironolactone Other (See Comments)     Breast pain/enlargement, soreness, dry mouth       Current Outpatient Medications on File Prior to Visit   Medication Sig Dispense Refill   • Cholecalciferol (VITAMIN D) 2000 units capsule Take 4,000 Units by mouth Daily.     • coenzyme Q10 100 MG capsule Take 200 mg by mouth Every Night.     • dilTIAZem CD (CARDIZEM CD) 300 MG 24 hr capsule Take 1 capsule by mouth Daily. 90 capsule 3   • levothyroxine (SYNTHROID, LEVOTHROID) 150 MCG tablet On Sunday and Wednesday add an additional 56 mcg daily per Dr Bloom     • olmesartan (BENICAR) 40 MG tablet Take 40 mg by mouth Daily.     • rivaroxaban (XARELTO) 20 MG tablet Take 20 mg by mouth Daily.     • rosuvastatin (CRESTOR) 20 MG tablet TAKE 1 TABLET DAILY 90 tablet 1   • vitamin B-12 (CYANOCOBALAMIN) 500 MCG tablet Take 500 mcg by mouth Daily.     • Synthroid 112 MCG tablet 112 mcg. 1/2 tab on Wednesday and Sunday in addition to 150 mc daily     • [DISCONTINUED] aspirin 81 MG EC tablet Take 81 mg by mouth Daily.       No current facility-administered medications on file prior to visit.       Past Medical History:   Diagnosis Date   • Abnormal stress test     Stress echo on 8/21/14 showed hypokinesis of the mid anteroseptum and distal anterior wall.   • Allergy to IVP dye     Severe itching   • Atherosclerosis     Atherosclerosis of abdominal aorta   • CAD (coronary artery disease) 09/2014    Mild, nonobstructive   • Gastric ulcer    • GERD (gastroesophageal reflux disease)    • Hemangioma     Hemangioma of liver (right lobe)   • Hyperlipidemia    • Hypertension    • Hypothyroidism     History of Hashimoto's and goiter - s/p total thyroidectomy in 11/12   • Migraine    • Mitral valve insufficiency     Moderate to severe by RAUL on 9/4/14.  Mild to moderate by echo on 10/28/15.   • Non-rheumatic tricuspid valve insufficiency    • Obstructive sleep apnea syndrome    • Osteopenia    • PAF (paroxysmal atrial fibrillation)  (HCC)     Afib with RVR during stress echo on 08/21/14.  None on event recorder in 01/2015.   • Vitamin D deficiency disease        Family History   Problem Relation Age of Onset   • Goiter Mother    • Pancreatic cancer Mother    • Heart failure Father    • Hypertension Father    • Diabetes Sister    • Coronary artery disease Sister         Sister with 3 vessel CABG at age 62   • Heart disease Sister    • No Known Problems Maternal Grandmother    • No Known Problems Maternal Grandfather    • Diabetes Paternal Grandmother    • No Known Problems Paternal Grandfather        Social History     Socioeconomic History   • Marital status:    Tobacco Use   • Smoking status: Never Smoker   • Smokeless tobacco: Never Used   Substance and Sexual Activity   • Alcohol use: Not Currently     Comment: Caffeine use: 2 cups daily   • Drug use: No   • Sexual activity: Defer       Past Surgical History:   Procedure Laterality Date   • CHOLECYSTECTOMY  1992   • EYE SURGERY      cataract surgery   • THYROID SURGERY  06/07/2012   • UTERINE FIBROID SURGERY      Several uterine ablation surgeries        The following portions of the patient's history were reviewed and updated as appropriate: problem list, allergies, current medications, past medical history, past family history, past social history and past surgical history.    Review of Systems    Immunization History   Administered Date(s) Administered   • COVID-19 (PFIZER) PURPLE CAP 02/25/2021, 03/18/2021, 09/29/2021   • FluMist 2-49yrs 10/30/2015   • Fluzone High Dose =>65 Years (Vaxcare ONLY) 09/01/2018, 10/02/2019   • Influenza TIV (IM) 09/21/2016   • Pneumococcal Conjugate 13-Valent (PCV13) 10/04/2016   • Pneumococcal Polysaccharide (PPSV23) 07/03/2019   • Shingrix 07/23/2019, 09/21/2019   • Tdap 01/01/2011   • Zostavax 02/25/2010       Objective   Vitals:    05/27/22 1006   BP: 114/76   Pulse: 92   Resp: 20   Temp: 96.9 °F (36.1 °C)   Weight: 99.8 kg (220 lb)   Height: 157.5  "cm (62\")     Body mass index is 40.24 kg/m².  Physical Exam  Vitals reviewed.   Constitutional:       Appearance: Normal appearance. She is well-developed. She is obese.   HENT:      Head: Normocephalic and atraumatic.      Mouth/Throat:      Mouth: Mucous membranes are moist.      Pharynx: Oropharynx is clear. No oropharyngeal exudate or posterior oropharyngeal erythema.   Cardiovascular:      Rate and Rhythm: Normal rate and regular rhythm.      Heart sounds: Normal heart sounds, S1 normal and S2 normal.   Pulmonary:      Effort: Pulmonary effort is normal.      Breath sounds: Normal breath sounds.   Abdominal:      General: Bowel sounds are normal. There is no distension.      Palpations: Abdomen is soft. There is no mass.      Tenderness: There is no abdominal tenderness.   Musculoskeletal:      Cervical back: Neck supple.      Comments: Ambulates with assistance of cane; no clubbing, cyanosis or edema.    Lymphadenopathy:      Cervical: No cervical adenopathy.   Skin:     General: Skin is warm and dry.   Neurological:      Mental Status: She is alert.   Psychiatric:      Comments: She is very tearful.          Procedures    Assessment & Plan   Diagnoses and all orders for this visit:    1. Painless hematuria (Primary)  Comments:  She has had negative imaging and reports negative cystoscopy. Dr. Lin advised continuing Xarelto at recent OV. Additional MDM pending labs.   Orders:  -     CBC & Differential  -     Comprehensive Metabolic Panel  -     Urinalysis With Culture If Indicated -    2. Primary hypertension  Comments:  Controlled. Continue current medications.     3. Mixed hyperlipidemia  Comments:  Recent FLP 01/0402022 looks good. Continu Crestor 20 mg daily.     4. Acquired hypothyroidism  Comments:  Followed by Dr. Elsa Bloom.     5. Vitamin D deficiency  Comments:  Continues OTC V-D 4,000 IU daily.     6. IFG (impaired fasting glucose)  Comments:  A1C last year 5.6%. Weight loss via dietary " changes advised.     7. Body mass index (BMI) of 40.0-44.9 in adult (HCC)  Comments:  Unable to exercise at this time with her knee. Weight loss via dietary changes advised.     Records reviewed include imaging available MRI abd/pelvis in April as well as labs in January 2022 and recent OV with Dr. Hodges. OVs, cystoscopy and imaging with  requested.     55 minutes spent reviewing records, taking history, examining patient, counseling and coordinating care.     Return in about 1 month (around 6/27/2022) for Lab Today.

## 2022-05-27 ENCOUNTER — OFFICE VISIT (OUTPATIENT)
Dept: INTERNAL MEDICINE | Facility: CLINIC | Age: 72
End: 2022-05-27

## 2022-05-27 VITALS
TEMPERATURE: 96.9 F | BODY MASS INDEX: 40.48 KG/M2 | HEART RATE: 92 BPM | DIASTOLIC BLOOD PRESSURE: 76 MMHG | SYSTOLIC BLOOD PRESSURE: 114 MMHG | WEIGHT: 220 LBS | HEIGHT: 62 IN | RESPIRATION RATE: 20 BRPM

## 2022-05-27 DIAGNOSIS — R31.9 PAINLESS HEMATURIA: Primary | ICD-10-CM

## 2022-05-27 DIAGNOSIS — I10 PRIMARY HYPERTENSION: Chronic | ICD-10-CM

## 2022-05-27 DIAGNOSIS — E78.2 MIXED HYPERLIPIDEMIA: Chronic | ICD-10-CM

## 2022-05-27 DIAGNOSIS — E03.9 ACQUIRED HYPOTHYROIDISM: Chronic | ICD-10-CM

## 2022-05-27 DIAGNOSIS — E55.9 VITAMIN D DEFICIENCY: ICD-10-CM

## 2022-05-27 DIAGNOSIS — R73.01 IFG (IMPAIRED FASTING GLUCOSE): ICD-10-CM

## 2022-05-27 DIAGNOSIS — R31.9 PAINLESS HEMATURIA: ICD-10-CM

## 2022-05-27 PROCEDURE — 99214 OFFICE O/P EST MOD 30 MIN: CPT | Performed by: NURSE PRACTITIONER

## 2022-05-27 RX ORDER — LEVOTHYROXINE SODIUM 112 MCG
112 TABLET ORAL
COMMUNITY
Start: 2022-03-14 | End: 2022-11-16 | Stop reason: ALTCHOICE

## 2022-05-28 LAB
APTT PPP: 30 SEC (ref 24–33)
INR PPP: 1 (ref 0.9–1.2)
PROTHROMBIN TIME: 11.1 SEC (ref 9.1–12)

## 2022-06-01 LAB
ALBUMIN SERPL-MCNC: 4.6 G/DL (ref 3.7–4.7)
ALBUMIN/GLOB SERPL: 1.8 {RATIO} (ref 1.2–2.2)
ALP SERPL-CCNC: 78 IU/L (ref 44–121)
ALT SERPL-CCNC: 11 IU/L (ref 0–32)
APPEARANCE UR: CLEAR
AST SERPL-CCNC: 11 IU/L (ref 0–40)
BACTERIA #/AREA URNS HPF: ABNORMAL /[HPF]
BACTERIA UR CULT: NORMAL
BACTERIA UR CULT: NORMAL
BASOPHILS # BLD AUTO: 0 X10E3/UL (ref 0–0.2)
BASOPHILS NFR BLD AUTO: 1 %
BILIRUB SERPL-MCNC: 0.8 MG/DL (ref 0–1.2)
BILIRUB UR QL STRIP: NEGATIVE
BUN SERPL-MCNC: 11 MG/DL (ref 8–27)
BUN/CREAT SERPL: 13 (ref 12–28)
CALCIUM SERPL-MCNC: 9.7 MG/DL (ref 8.7–10.3)
CASTS URNS QL MICRO: ABNORMAL /LPF
CHLORIDE SERPL-SCNC: 105 MMOL/L (ref 96–106)
CO2 SERPL-SCNC: 23 MMOL/L (ref 20–29)
COLOR UR: YELLOW
CREAT SERPL-MCNC: 0.83 MG/DL (ref 0.57–1)
EGFRCR SERPLBLD CKD-EPI 2021: 75 ML/MIN/1.73
EOSINOPHIL # BLD AUTO: 0.1 X10E3/UL (ref 0–0.4)
EOSINOPHIL NFR BLD AUTO: 1 %
EPI CELLS #/AREA URNS HPF: ABNORMAL /HPF (ref 0–10)
ERYTHROCYTE [DISTWIDTH] IN BLOOD BY AUTOMATED COUNT: 12 % (ref 11.7–15.4)
GLOBULIN SER CALC-MCNC: 2.5 G/DL (ref 1.5–4.5)
GLUCOSE SERPL-MCNC: 110 MG/DL (ref 65–99)
GLUCOSE UR QL STRIP: NEGATIVE
HCT VFR BLD AUTO: 42.2 % (ref 34–46.6)
HGB BLD-MCNC: 13.9 G/DL (ref 11.1–15.9)
HGB UR QL STRIP: ABNORMAL
IMM GRANULOCYTES # BLD AUTO: 0 X10E3/UL (ref 0–0.1)
IMM GRANULOCYTES NFR BLD AUTO: 0 %
KETONES UR QL STRIP: NEGATIVE
LEUKOCYTE ESTERASE UR QL STRIP: ABNORMAL
LYMPHOCYTES # BLD AUTO: 1.6 X10E3/UL (ref 0.7–3.1)
LYMPHOCYTES NFR BLD AUTO: 25 %
MCH RBC QN AUTO: 30.2 PG (ref 26.6–33)
MCHC RBC AUTO-ENTMCNC: 32.9 G/DL (ref 31.5–35.7)
MCV RBC AUTO: 92 FL (ref 79–97)
MICRO URNS: ABNORMAL
MONOCYTES # BLD AUTO: 0.4 X10E3/UL (ref 0.1–0.9)
MONOCYTES NFR BLD AUTO: 7 %
NEUTROPHILS # BLD AUTO: 4.2 X10E3/UL (ref 1.4–7)
NEUTROPHILS NFR BLD AUTO: 66 %
NITRITE UR QL STRIP: NEGATIVE
PH UR STRIP: 5.5 [PH] (ref 5–7.5)
PLATELET # BLD AUTO: 248 X10E3/UL (ref 150–450)
POTASSIUM SERPL-SCNC: 3.9 MMOL/L (ref 3.5–5.2)
PROT SERPL-MCNC: 7.1 G/DL (ref 6–8.5)
PROT UR QL STRIP: NEGATIVE
RBC # BLD AUTO: 4.61 X10E6/UL (ref 3.77–5.28)
RBC #/AREA URNS HPF: ABNORMAL /HPF (ref 0–2)
SODIUM SERPL-SCNC: 142 MMOL/L (ref 134–144)
SP GR UR STRIP: 1.02 (ref 1–1.03)
URINALYSIS REFLEX: ABNORMAL
UROBILINOGEN UR STRIP-MCNC: 0.2 MG/DL (ref 0.2–1)
WBC # BLD AUTO: 6.4 X10E3/UL (ref 3.4–10.8)
WBC #/AREA URNS HPF: ABNORMAL /HPF (ref 0–5)

## 2022-06-09 ENCOUNTER — TELEPHONE (OUTPATIENT)
Dept: INTERNAL MEDICINE | Facility: CLINIC | Age: 72
End: 2022-06-09

## 2022-06-09 DIAGNOSIS — R31.9 HEMATURIA, UNSPECIFIED TYPE: Primary | ICD-10-CM

## 2022-06-09 NOTE — TELEPHONE ENCOUNTER
Please let her know I have looked over her imaging and office notes with urology as well as FISH and cytology done with her cystoscopy. Findings are benign. Given she continues to have blood in her stool with cause not yet identifed I advise she see nephrology--Dr. Krueger or Dr. Seymour Zhang.    No notes yet from GYN.

## 2022-06-10 ENCOUNTER — TELEPHONE (OUTPATIENT)
Dept: INTERNAL MEDICINE | Facility: CLINIC | Age: 72
End: 2022-06-10

## 2022-06-10 NOTE — TELEPHONE ENCOUNTER
Caller: Liza Mcnair    Relationship: Self    Best call back number: [phone]     What is the best time to reach you: ANY TIME     Who are you requesting to speak with (clinical staff, provider,  specific staff member):  JOSH     What was the call regarding: PATIENT WOULD LIKE A CALL BACK FROM JOSH REGARDING THEIR PHONE CALL YESTERDAY.  SHE STATES  JOSH TOLD  HER THAT HERVE LEVY WOULD LIKE PATIENT TO SEE A NEPHROLOGIST, AND PATIENT HAS SOME FURTHER QUESTIONS.  PLEASE CONTACT PATIENT TO CLARIFY.         Do you require a callback: YES

## 2022-06-14 DIAGNOSIS — E78.2 MIXED HYPERLIPIDEMIA: ICD-10-CM

## 2022-06-14 DIAGNOSIS — I10 PRIMARY HYPERTENSION: Primary | ICD-10-CM

## 2022-06-14 RX ORDER — OLMESARTAN MEDOXOMIL 40 MG/1
40 TABLET ORAL DAILY
Qty: 90 TABLET | Refills: 1 | Status: SHIPPED | OUTPATIENT
Start: 2022-06-14 | End: 2022-12-07

## 2022-06-14 RX ORDER — ROSUVASTATIN CALCIUM 20 MG/1
20 TABLET, COATED ORAL DAILY
Qty: 90 TABLET | Refills: 3 | Status: SHIPPED | OUTPATIENT
Start: 2022-06-14

## 2022-07-05 NOTE — PROGRESS NOTES
Subjective   Chief Complaint   Patient presents with   • Blood in Urine     F/u       History of Present Illness   71 y.o. female presents for follow up regarding painless hematuria which started in February/March 2022. Evaluation with urology (Dr. Tracy) including CT abd/pelvis with and without contrast as well as MRI abdomen in April cystoscopy, FISH and cytology unrevealing and she was advised to RTC in 6 months. Also had unrevealing evaluation with GYN (Dr. Garcia). Advised by cardiology (Dr. Hodges) to continue Xarelto for A-fib. She is scheduled with Dr. Krueger  In August.     No longer seeing blood in her urine. No burning or frequency. She has lost 4# since May. Making dietary changesShe has been working on losing weigh as she hopes to get her knees replaced.     Diagnosed with LORI 16 years ago before diagnosed with A-fib. At that time she was advised she did not need CPAP and could just lose weight and not sleep on her back. She is now willing to be retested.      Patient Active Problem List   Diagnosis   • Degeneration of intervertebral disc   • Hyperlipidemia   • Hypothyroidism   • Obstructive sleep apnea syndrome   • Osteoarthritis of knee   • Paroxysmal atrial fibrillation (HCC)   • Vitamin D deficiency   • IFG (impaired fasting glucose)   • Hypertension   • Body mass index (BMI) of 40.0-44.9 in adult (HCC)   • Other fatigue   • Diastolic dysfunction       Allergies   Allergen Reactions   • Iodinated Diagnostic Agents Itching   • Iodine Itching   • Tartrazine Itching     Cat scan dye   • Hydrochlorothiazide Other (See Comments)     weakness   • Spironolactone Other (See Comments)     Breast pain/enlargement, soreness, dry mouth       Current Outpatient Medications on File Prior to Visit   Medication Sig Dispense Refill   • Cholecalciferol (VITAMIN D) 2000 units capsule Take 4,000 Units by mouth Daily.     • coenzyme Q10 100 MG capsule Take 200 mg by mouth Every Night.     • dilTIAZem CD  (CARDIZEM CD) 300 MG 24 hr capsule Take 1 capsule by mouth Daily. 90 capsule 3   • levothyroxine (SYNTHROID, LEVOTHROID) 150 MCG tablet On Sunday and Wednesday add an additional 56 mcg daily per Dr Bloom     • olmesartan (BENICAR) 40 MG tablet Take 1 tablet by mouth Daily. 90 tablet 1   • rivaroxaban (XARELTO) 20 MG tablet Take 20 mg by mouth Daily.     • rosuvastatin (CRESTOR) 20 MG tablet Take 1 tablet by mouth Daily. 90 tablet 3   • Synthroid 112 MCG tablet 112 mcg. 1/2 tab on Wednesday and Sunday in addition to 150 mc daily     • vitamin B-12 (CYANOCOBALAMIN) 500 MCG tablet Take 500 mcg by mouth Daily.       No current facility-administered medications on file prior to visit.       Past Medical History:   Diagnosis Date   • Abnormal stress test     Stress echo on 8/21/14 showed hypokinesis of the mid anteroseptum and distal anterior wall.   • Allergy to IVP dye     Severe itching   • Atherosclerosis     Atherosclerosis of abdominal aorta   • CAD (coronary artery disease) 09/2014    Mild, nonobstructive   • Gastric ulcer    • GERD (gastroesophageal reflux disease)    • Hemangioma     Hemangioma of liver (right lobe)   • Hyperlipidemia    • Hypertension    • Hypothyroidism     History of Hashimoto's and goiter - s/p total thyroidectomy in 11/12   • Migraine    • Mitral valve insufficiency     Moderate to severe by RAUL on 9/4/14.  Mild to moderate by echo on 10/28/15.   • Non-rheumatic tricuspid valve insufficiency    • Obstructive sleep apnea syndrome    • Osteopenia        Family History   Problem Relation Age of Onset   • Goiter Mother    • Pancreatic cancer Mother    • Heart failure Father    • Hypertension Father    • Diabetes Sister    • Coronary artery disease Sister         Sister with 3 vessel CABG at age 62   • Heart disease Sister    • No Known Problems Maternal Grandmother    • No Known Problems Maternal Grandfather    • Diabetes Paternal Grandmother    • No Known Problems Paternal Grandfather   "      Social History     Socioeconomic History   • Marital status:    Tobacco Use   • Smoking status: Never Smoker   • Smokeless tobacco: Never Used   Substance and Sexual Activity   • Alcohol use: Not Currently     Comment: Caffeine use: 2 cups daily   • Drug use: No   • Sexual activity: Defer       Past Surgical History:   Procedure Laterality Date   • CHOLECYSTECTOMY  1992   • EYE SURGERY      cataract surgery   • THYROID SURGERY  06/07/2012   • UTERINE FIBROID SURGERY      Several uterine ablation surgeries        The following portions of the patient's history were reviewed and updated as appropriate: problem list, allergies, current medications, past medical history and past social history.    Review of Systems    Immunization History   Administered Date(s) Administered   • COVID-19 (PFIZER) PURPLE CAP 02/25/2021, 03/18/2021, 09/29/2021   • FLUAD TRI 65YR+ 09/01/2018   • FluMist 2-49yrs 10/30/2015   • Fluad Quad 65+ 09/21/2020, 10/13/2021   • Fluzone High Dose =>65 Years (Vaxcare ONLY) 09/01/2018, 10/02/2019   • Hepatitis A 04/26/2018   • Influenza TIV (IM) 09/21/2016   • Influenza, Unspecified 10/18/2017   • Pneumococcal Conjugate 13-Valent (PCV13) 10/04/2016   • Pneumococcal Polysaccharide (PPSV23) 07/03/2019   • Shingrix 07/23/2019, 09/21/2019   • Tdap 01/01/2011   • Zostavax 02/25/2010       Objective   Vitals:    07/07/22 1337   Temp: 98 °F (36.7 °C)   Weight: 98 kg (216 lb)   Height: 157.5 cm (62\")     Body mass index is 39.51 kg/m².  Physical Exam  Vitals reviewed.   Constitutional:       Appearance: Normal appearance. She is well-developed. She is obese.   HENT:      Head: Normocephalic and atraumatic.   Cardiovascular:      Rate and Rhythm: Normal rate.      Heart sounds: Normal heart sounds, S1 normal and S2 normal.      Comments: Irregularly irregular.  Pulmonary:      Effort: Pulmonary effort is normal.      Breath sounds: Normal breath sounds.   Skin:     General: Skin is warm and dry. "   Neurological:      Mental Status: She is alert.   Psychiatric:         Mood and Affect: Mood normal.         Behavior: Behavior normal.         Procedures    Assessment & Plan   Diagnoses and all orders for this visit:    1. Painless hematuria (Primary)  Comments:  She no longer has gross hematuria.Imaging, cystoscopy and cytology unrevealing. Scheduled with Dr. Krueger 08/09. Continues Xarelto for a-fib.     2. IFG (impaired fasting glucose)  Comments:  A1C with labs prior to follow up. She is working on weight loss. Last A1C 5.6%.   Orders:  -     Hemoglobin A1c; Future    3. Primary hypertension  Comments:  Controlled. Continue current medications.   Orders:  -     Comprehensive Metabolic Panel; Future  -     Lipid Panel With / Chol / HDL Ratio; Future    4. Obstructive sleep apnea syndrome  Comments:  She is willing to schedule with sleep medicine given A-fib.   Orders:  -     Ambulatory Referral to Sleep Medicine    5. Paroxysmal atrial fibrillation (HCC)  -     Ambulatory Referral to Sleep Medicine    6. Mixed hyperlipidemia    Records reviewed include previous OV with myself as well as labs.     Return in about 6 months (around 1/7/2023) for Lab B4 [facility], [facility].

## 2022-07-07 ENCOUNTER — OFFICE VISIT (OUTPATIENT)
Dept: INTERNAL MEDICINE | Facility: CLINIC | Age: 72
End: 2022-07-07

## 2022-07-07 VITALS — HEIGHT: 62 IN | TEMPERATURE: 98 F | BODY MASS INDEX: 39.75 KG/M2 | WEIGHT: 216 LBS

## 2022-07-07 DIAGNOSIS — R31.9 PAINLESS HEMATURIA: Primary | ICD-10-CM

## 2022-07-07 DIAGNOSIS — G47.33 OBSTRUCTIVE SLEEP APNEA SYNDROME: ICD-10-CM

## 2022-07-07 DIAGNOSIS — R73.01 IFG (IMPAIRED FASTING GLUCOSE): ICD-10-CM

## 2022-07-07 DIAGNOSIS — I10 PRIMARY HYPERTENSION: Chronic | ICD-10-CM

## 2022-07-07 DIAGNOSIS — E78.2 MIXED HYPERLIPIDEMIA: Chronic | ICD-10-CM

## 2022-07-07 DIAGNOSIS — I48.0 PAROXYSMAL ATRIAL FIBRILLATION: ICD-10-CM

## 2022-07-07 PROCEDURE — 99214 OFFICE O/P EST MOD 30 MIN: CPT | Performed by: NURSE PRACTITIONER

## 2022-07-12 ENCOUNTER — TELEPHONE (OUTPATIENT)
Dept: INTERNAL MEDICINE | Facility: CLINIC | Age: 72
End: 2022-07-12

## 2022-07-12 NOTE — TELEPHONE ENCOUNTER
Caller: Liza Mcnair    Relationship: Self    Best call back number: [phone]    What is the best time to reach you: THIS MORNING    Who are you requesting to speak with (clinical staff, provider,  specific staff member): CLINICAL    What was the call regarding: REGARDING HER SLEEP APNEA TESTING .  SHE THOUGHT SHE WOULD BE MEETING WITH SOMEONE IN THE OFFICE TO DO THE TESTING, NOT [facility] ?     Do you require a callback: YES

## 2022-07-12 NOTE — TELEPHONE ENCOUNTER
Spoke with pt and informed her that we do not do sleep studies here in the office, they are done at pulmonology

## 2022-07-27 ENCOUNTER — LAB (OUTPATIENT)
Dept: LAB | Facility: HOSPITAL | Age: 72
End: 2022-07-27

## 2022-07-27 ENCOUNTER — TRANSCRIBE ORDERS (OUTPATIENT)
Dept: ADMINISTRATIVE | Facility: HOSPITAL | Age: 72
End: 2022-07-27

## 2022-07-27 DIAGNOSIS — I10 ESSENTIAL HYPERTENSION, MALIGNANT: ICD-10-CM

## 2022-07-27 DIAGNOSIS — E89.0 POST-OPERATIVE HYPOTHYROIDISM: ICD-10-CM

## 2022-07-27 DIAGNOSIS — R68.89 OTHER GENERAL SYMPTOMS AND SIGNS: ICD-10-CM

## 2022-07-27 DIAGNOSIS — R68.89 OTHER GENERAL SYMPTOMS AND SIGNS: Primary | ICD-10-CM

## 2022-07-27 DIAGNOSIS — E78.5 HYPERLIPIDEMIA, UNSPECIFIED HYPERLIPIDEMIA TYPE: ICD-10-CM

## 2022-07-27 LAB
ALBUMIN SERPL-MCNC: 4.5 G/DL (ref 3.5–5.2)
ALBUMIN/GLOB SERPL: 1.8 G/DL
ALP SERPL-CCNC: 70 U/L (ref 39–117)
ALT SERPL W P-5'-P-CCNC: 14 U/L (ref 1–33)
ANION GAP SERPL CALCULATED.3IONS-SCNC: 12 MMOL/L (ref 5–15)
AST SERPL-CCNC: 17 U/L (ref 1–32)
BILIRUB SERPL-MCNC: 1 MG/DL (ref 0–1.2)
BUN SERPL-MCNC: 19 MG/DL (ref 8–23)
BUN/CREAT SERPL: 25.7 (ref 7–25)
CALCIUM SPEC-SCNC: 9.7 MG/DL (ref 8.6–10.5)
CHLORIDE SERPL-SCNC: 106 MMOL/L (ref 98–107)
CO2 SERPL-SCNC: 25 MMOL/L (ref 22–29)
CREAT SERPL-MCNC: 0.74 MG/DL (ref 0.57–1)
EGFRCR SERPLBLD CKD-EPI 2021: 86.1 ML/MIN/1.73
GLOBULIN UR ELPH-MCNC: 2.5 GM/DL
GLUCOSE SERPL-MCNC: 100 MG/DL (ref 65–99)
POTASSIUM SERPL-SCNC: 4.4 MMOL/L (ref 3.5–5.2)
PROT SERPL-MCNC: 7 G/DL (ref 6–8.5)
SODIUM SERPL-SCNC: 143 MMOL/L (ref 136–145)
T3FREE SERPL-MCNC: 2.73 PG/ML (ref 2–4.4)
T4 FREE SERPL-MCNC: 1.99 NG/DL (ref 0.93–1.7)
TSH SERPL DL<=0.05 MIU/L-ACNC: 0.89 UIU/ML (ref 0.27–4.2)

## 2022-07-27 PROCEDURE — 84443 ASSAY THYROID STIM HORMONE: CPT

## 2022-07-27 PROCEDURE — 80053 COMPREHEN METABOLIC PANEL: CPT

## 2022-07-27 PROCEDURE — 84439 ASSAY OF FREE THYROXINE: CPT

## 2022-07-27 PROCEDURE — 36415 COLL VENOUS BLD VENIPUNCTURE: CPT

## 2022-07-27 PROCEDURE — 84481 FREE ASSAY (FT-3): CPT

## 2022-09-09 ENCOUNTER — TELEPHONE (OUTPATIENT)
Dept: INTERNAL MEDICINE | Facility: CLINIC | Age: 72
End: 2022-09-09

## 2022-09-09 NOTE — TELEPHONE ENCOUNTER
Caller: Liza Mcnair    Relationship: Self    Best call back number: [phone]     What was the call regarding: PATIENT CALLING WANTING TO KNOW IF HERVE LEVY HAS RECEIVED MEDICAL RECORDS FROM DR.ZHALET ADAM SHE WAS TOLD THAT A 3RD PARTY WAS SUPPOSE TO GET THIS SENT.

## 2022-10-31 ENCOUNTER — TRANSCRIBE ORDERS (OUTPATIENT)
Dept: ADMINISTRATIVE | Facility: HOSPITAL | Age: 72
End: 2022-10-31

## 2022-10-31 ENCOUNTER — LAB (OUTPATIENT)
Dept: LAB | Facility: HOSPITAL | Age: 72
End: 2022-10-31

## 2022-10-31 DIAGNOSIS — R68.89 OTHER GENERAL SYMPTOMS AND SIGNS: ICD-10-CM

## 2022-10-31 DIAGNOSIS — E78.2 MIXED HYPERLIPIDEMIA: Primary | ICD-10-CM

## 2022-10-31 DIAGNOSIS — E89.0 POSTSURGICAL HYPOTHYROIDISM: ICD-10-CM

## 2022-10-31 DIAGNOSIS — I10 ESSENTIAL HYPERTENSION: ICD-10-CM

## 2022-10-31 DIAGNOSIS — R73.01 IMPAIRED FASTING GLUCOSE: ICD-10-CM

## 2022-10-31 DIAGNOSIS — E78.2 MIXED HYPERLIPIDEMIA: ICD-10-CM

## 2022-10-31 LAB
ALBUMIN SERPL-MCNC: 4.3 G/DL (ref 3.5–5.2)
ALBUMIN/GLOB SERPL: 1.8 G/DL
ALP SERPL-CCNC: 74 U/L (ref 39–117)
ALT SERPL W P-5'-P-CCNC: 13 U/L (ref 1–33)
ANION GAP SERPL CALCULATED.3IONS-SCNC: 10 MMOL/L (ref 5–15)
AST SERPL-CCNC: 16 U/L (ref 1–32)
BILIRUB SERPL-MCNC: 0.9 MG/DL (ref 0–1.2)
BUN SERPL-MCNC: 15 MG/DL (ref 8–23)
BUN/CREAT SERPL: 17 (ref 7–25)
CALCIUM SPEC-SCNC: 9.3 MG/DL (ref 8.6–10.5)
CHLORIDE SERPL-SCNC: 107 MMOL/L (ref 98–107)
CO2 SERPL-SCNC: 25 MMOL/L (ref 22–29)
CREAT SERPL-MCNC: 0.88 MG/DL (ref 0.57–1)
EGFRCR SERPLBLD CKD-EPI 2021: 69.5 ML/MIN/1.73
GLOBULIN UR ELPH-MCNC: 2.4 GM/DL
GLUCOSE SERPL-MCNC: 106 MG/DL (ref 65–99)
HBA1C MFR BLD: 5.8 % (ref 4.8–5.6)
POTASSIUM SERPL-SCNC: 4.1 MMOL/L (ref 3.5–5.2)
PROT SERPL-MCNC: 6.7 G/DL (ref 6–8.5)
SODIUM SERPL-SCNC: 142 MMOL/L (ref 136–145)
T3 SERPL-MCNC: 93.1 NG/DL (ref 80–200)
T4 FREE SERPL-MCNC: 1.89 NG/DL (ref 0.93–1.7)
TSH SERPL DL<=0.05 MIU/L-ACNC: 2.44 UIU/ML (ref 0.27–4.2)

## 2022-10-31 PROCEDURE — 84443 ASSAY THYROID STIM HORMONE: CPT

## 2022-10-31 PROCEDURE — 83036 HEMOGLOBIN GLYCOSYLATED A1C: CPT

## 2022-10-31 PROCEDURE — 84480 ASSAY TRIIODOTHYRONINE (T3): CPT

## 2022-10-31 PROCEDURE — 80053 COMPREHEN METABOLIC PANEL: CPT

## 2022-10-31 PROCEDURE — 36415 COLL VENOUS BLD VENIPUNCTURE: CPT

## 2022-10-31 PROCEDURE — 84439 ASSAY OF FREE THYROXINE: CPT

## 2022-11-07 ENCOUNTER — TRANSCRIBE ORDERS (OUTPATIENT)
Dept: SLEEP MEDICINE | Facility: HOSPITAL | Age: 72
End: 2022-11-07

## 2022-11-07 DIAGNOSIS — G47.33 OBSTRUCTIVE SLEEP APNEA SYNDROME: Primary | ICD-10-CM

## 2022-11-16 ENCOUNTER — OFFICE VISIT (OUTPATIENT)
Dept: CARDIOLOGY | Facility: CLINIC | Age: 72
End: 2022-11-16

## 2022-11-16 VITALS
BODY MASS INDEX: 41.41 KG/M2 | HEART RATE: 95 BPM | WEIGHT: 225 LBS | DIASTOLIC BLOOD PRESSURE: 82 MMHG | SYSTOLIC BLOOD PRESSURE: 120 MMHG | HEIGHT: 62 IN

## 2022-11-16 DIAGNOSIS — G47.33 OBSTRUCTIVE SLEEP APNEA SYNDROME: ICD-10-CM

## 2022-11-16 DIAGNOSIS — E78.2 MIXED HYPERLIPIDEMIA: ICD-10-CM

## 2022-11-16 DIAGNOSIS — I48.0 PAROXYSMAL ATRIAL FIBRILLATION: Primary | ICD-10-CM

## 2022-11-16 DIAGNOSIS — I10 PRIMARY HYPERTENSION: ICD-10-CM

## 2022-11-16 DIAGNOSIS — I51.89 DIASTOLIC DYSFUNCTION: ICD-10-CM

## 2022-11-16 PROCEDURE — 99214 OFFICE O/P EST MOD 30 MIN: CPT | Performed by: NURSE PRACTITIONER

## 2022-11-16 PROCEDURE — 93000 ELECTROCARDIOGRAM COMPLETE: CPT | Performed by: NURSE PRACTITIONER

## 2022-11-16 RX ORDER — DILTIAZEM HYDROCHLORIDE 300 MG/1
300 CAPSULE, COATED, EXTENDED RELEASE ORAL DAILY
Qty: 90 CAPSULE | Refills: 3 | Status: SHIPPED | OUTPATIENT
Start: 2022-11-16 | End: 2022-11-16 | Stop reason: SDUPTHER

## 2022-11-16 RX ORDER — DILTIAZEM HYDROCHLORIDE 300 MG/1
300 CAPSULE, COATED, EXTENDED RELEASE ORAL DAILY
Qty: 90 CAPSULE | Refills: 3 | Status: SHIPPED | OUTPATIENT
Start: 2022-11-16

## 2022-11-16 NOTE — PROGRESS NOTES
Date of Office Visit: 2022  Encounter Provider: MIMI Shelton  Place of Service: [facility] CARDIOLOGY  Patient Name: Liza Mcnair  :1950  Primary Cardiologist: Dr. Kelly Hodges     Chief Complaint   Patient presents with   • Atrial Fibrillation   • Follow-up   :     HPI: Liza Mcnair is a pleasant 72 y.o. female who presents today for follow-up on paroxysmal atrial fibrillation.  I have reviewed her medical records.     She has been diagnosed with hypertension, hyperlipidemia, hypothyroidism, and obesity.  She has been diagnosed with mild to moderate sleep apnea and does not wear a CPAP or BiPAP machine.    In 2014, she had a Myoview stress test which was abnormal and developed atrial fibrillation during the study.  She was diagnosed with nonobstructive coronary atherosclerosis per cardiac cath.  In 2015, event recorder was normal.    In 2018, echocardiogram showed normal LVEF and grade 1 diastolic dysfunction.      In 2019, she reported dyspnea and decreased stamina.  Nuclear stress test showed a mild abnormality.  Dr. Hodges reviewed and felt that it was a low risk study and recommended medical treatment.  Her diltiazem was increased for palpitations.    In May 2020, she wore a 14-day Holter monitor which was normal.    In 2021, she had the Pfizer booster vaccine and states that she had a severe reaction with left ear pain, malaise, muscle aches, joint pain, periorbital edema, right jaw swelling, and felt miserable.      In 2022, she developed left periorbital swelling and fever. She went to the urgent care center.  She was told that her blood pressure was elevated.  According to her own home BP machine her blood pressure was elevated and that said her heart rate was irregular.  TSH level was normal.     Later in 2022, she saw her PCP.  She had stopped her spironolactone and her blood pressure  was elevated at 168/92.  EKG confirmed atrial fibrillation with a heart rate of 100.  She was started on rivaroxaban.    In February 2022, she followed up with me.  She was reporting brief palpitations, worsening dyspnea with exertion, and exertional chest pressure that would resolve with rest.  Echocardiogram was normal except for trace to mild MR.  Cardiac PET scan was normal.  In May 2022, she followed up with Dr. Hodges and was feeling better.    Today is her 6-month follow-up visit.  She said her thyroid level was recently high and she was recommended to decrease her thyroid medication.  She had a headache earlier today and took some Tylenol.  She continues to experience mild dyspnea with exertion which is unchanged.  She denies chest pain, PND, orthopnea, palpitations, edema, dizziness, syncope, or bleeding.  She is in atrial fibrillation today and rate controlled.  Blood pressure was elevated on the first check and better on the second check.  She is scheduled for a sleep study tomorrow.      Past Medical History:   Diagnosis Date   • Abnormal stress test     Stress echo on 8/21/14 showed hypokinesis of the mid anteroseptum and distal anterior wall.   • Allergy to IVP dye     Severe itching   • Atherosclerosis     Atherosclerosis of abdominal aorta   • CAD (coronary artery disease) 09/2014    Mild, nonobstructive   • Gastric ulcer    • GERD (gastroesophageal reflux disease)    • Hemangioma     Hemangioma of liver (right lobe)   • Hyperlipidemia    • Hypertension    • Hypothyroidism     History of Hashimoto's and goiter - s/p total thyroidectomy in 11/12   • Migraine    • Mitral valve insufficiency     Moderate to severe by RAUL on 9/4/14.  Mild to moderate by echo on 10/28/15.   • Non-rheumatic tricuspid valve insufficiency    • Obstructive sleep apnea syndrome    • Osteopenia        Past Surgical History:   Procedure Laterality Date   • CHOLECYSTECTOMY  1992   • EYE SURGERY      cataract surgery   •  THYROID SURGERY  06/07/2012   • UTERINE FIBROID SURGERY      Several uterine ablation surgeries        Social History     Socioeconomic History   • Marital status:    Tobacco Use   • Smoking status: Never   • Smokeless tobacco: Never   Substance and Sexual Activity   • Alcohol use: Not Currently     Comment: Caffeine use: 2 cups daily   • Drug use: No   • Sexual activity: Defer       Family History   Problem Relation Age of Onset   • Goiter Mother    • Pancreatic cancer Mother    • Heart failure Father    • Hypertension Father    • Diabetes Sister    • Coronary artery disease Sister         Sister with 3 vessel CABG at age 62   • Heart disease Sister    • No Known Problems Maternal Grandmother    • No Known Problems Maternal Grandfather    • Diabetes Paternal Grandmother    • No Known Problems Paternal Grandfather        The following portion of the patient's history were reviewed and updated as appropriate: past medical history, past surgical history, past social history, past family history, allergies, current medications, and problem list.    Review of Systems   Constitutional: Negative for weight gain.   Cardiovascular: Positive for dyspnea on exertion. Negative for chest pain, leg swelling and palpitations.   Respiratory: Positive for shortness of breath.    Musculoskeletal: Positive for joint pain and myalgias.   Neurological: Negative.        Allergies   Allergen Reactions   • Iodinated Diagnostic Agents Itching   • Iodine Itching   • Tartrazine Itching     Cat scan dye   • Hydrochlorothiazide Other (See Comments)     weakness   • Spironolactone Other (See Comments)     Breast pain/enlargement, soreness, dry mouth         Current Outpatient Medications:   •  Cholecalciferol (VITAMIN D) 2000 units capsule, Take 4,000 Units by mouth Daily., Disp: , Rfl:   •  coenzyme Q10 100 MG capsule, Take 200 mg by mouth Every Night., Disp: , Rfl:   •  dilTIAZem CD (CARDIZEM CD) 300 MG 24 hr capsule, Take 1 capsule by  "mouth Daily., Disp: 90 capsule, Rfl: 3  •  levothyroxine (SYNTHROID, LEVOTHROID) 150 MCG tablet, Daily. 7 DAYS A WEEK ONLY TAKE 150 MCG SYNTHROID., Disp: , Rfl:   •  olmesartan (BENICAR) 40 MG tablet, Take 1 tablet by mouth Daily., Disp: 90 tablet, Rfl: 1  •  rivaroxaban (XARELTO) 20 MG tablet, Take 20 mg by mouth Daily., Disp: , Rfl:   •  rosuvastatin (CRESTOR) 20 MG tablet, Take 1 tablet by mouth Daily., Disp: 90 tablet, Rfl: 3  •  vitamin B-12 (CYANOCOBALAMIN) 500 MCG tablet, Take 500 mcg by mouth Daily., Disp: , Rfl:         Objective:     Vitals:    11/16/22 1440 11/16/22 1510   BP: 160/100 120/82   BP Location: Left arm Left arm   Patient Position: Sitting Sitting   Cuff Size: Adult    Pulse: 95    Weight: 102 kg (225 lb)    Height: 157.5 cm (62\")      Body mass index is 41.15 kg/m².    PHYSICAL EXAM:    Vitals Reviewed.   General Appearance: No acute distress, well developed and well nourished. Obese.   Eyes: Conjunctiva and lids: No erythema, swelling, or discharge. Sclera non-icteric.   HENT: Atraumatic, normocephalic. External eyes, ears, and nose normal. No hearing loss noted. Mucous membranes normal. Lips not cyanotic. Neck supple with no tenderness.  Wearing a mask.  Respiratory: No signs of respiratory distress. Respiration rhythm and depth normal.   Clear to auscultation. No rales, crackles, rhonchi, or wheezing auscultated.   Cardiovascular:  Jugular Venous Pressure: Normal  Heart Rate and Rhythm: Irregularly, irregular.  Heart Sounds: Normal S1 and S2. No S3 or S4 noted.  Murmurs: No murmurs noted. No rubs, thrills, or gallops.   Lower Extremities: No lower extremity edema noted.  Gastrointestinal:  Abdomen soft, non-distended, non-tender. Normal bowel sounds.    Musculoskeletal: Normal movement of extremities  Skin and Nails: General appearance normal. No pallor, cyanosis, diaphoresis. Skin temperature normal. No clubbing of fingernails.   Psychiatric: Patient alert and oriented to person, place, " and time. Speech and behavior appropriate. Normal mood and affect.       ECG 12 Lead    Date/Time: 11/16/2022 2:35 PM  Performed by: Lanette Sellers APRN  Authorized by: Lanette Sellers APRN   Comparison: compared with previous ECG from 5/11/2022  Similar to previous ECG  Rhythm: atrial fibrillation  Rate: normal  BPM: 95  Conduction: conduction normal  ST Segments: ST segments normal  T Waves: T waves normal  QRS axis: normal    Clinical impression: abnormal EKG              Assessment:       Diagnosis Plan   1. Paroxysmal atrial fibrillation (HCC)  ECG 12 Lead      2. Diastolic dysfunction        3. Primary hypertension        4. Mixed hyperlipidemia        5. Obstructive sleep apnea syndrome               Plan:       1.  Paroxysmal Atrial fibrillation: First diagnosed in 2014 during a stress test.  The A. fib was then paroxysmal and she was noted to have A. fib again in January 2022.  She was started on rivaroxaban.  Today she is in atrial fibrillation and rate controlled.  Continue diltiazem and rivaroxaban.  She has a [id number] score of 4, putting her at high risk of a thromboembolic event annually.    2.  Diastolic Dysfunction: Reports mild dyspnea with exertion.  Euvolemic.    3.  Hypertension: Blood pressure elevated on the first check and normal on the second check.    4.  Hyperlipidemia: Remains on rosuvastatin.    5.  Obstructive Sleep Apnea: She is having a sleep study completed tomorrow.    6.  She prefers to follow-up with Dr. Hodges in 6 months.          As always, it has been a pleasure to participate in your patient's care. Thank you.       Sincerely,       MIMI Pineda  [facility] Cardiology      · COVID-19 Precautions - Patient was compliant in wearing a mask. When I saw the patient, I used appropriate personal protective equipment (PPE) including mask and eye shield (standard procedure).  Additionally, I used gown and gloves if indicated.  Hand hygiene was  completed before and after seeing the patient.  · Dictated utilizing Dragon Dictation

## 2022-11-17 ENCOUNTER — HOSPITAL ENCOUNTER (OUTPATIENT)
Dept: SLEEP MEDICINE | Facility: HOSPITAL | Age: 72
Discharge: HOME | End: 2022-11-17
Admitting: INTERNAL MEDICINE

## 2022-11-17 DIAGNOSIS — G47.33 OBSTRUCTIVE SLEEP APNEA SYNDROME: ICD-10-CM

## 2022-11-17 PROCEDURE — 95810 POLYSOM 6/> YRS 4/> PARAM: CPT

## 2022-11-18 ENCOUNTER — TELEPHONE (OUTPATIENT)
Dept: SLEEP MEDICINE | Facility: HOSPITAL | Age: 72
End: 2022-11-18

## 2022-11-29 ENCOUNTER — TELEPHONE (OUTPATIENT)
Dept: INTERNAL MEDICINE | Facility: CLINIC | Age: 72
End: 2022-11-29

## 2022-11-29 NOTE — TELEPHONE ENCOUNTER
There is a GI virus going around. Continue to push fluids and eat small amounts of things that are easy on the stomach (chicken broth, toast, etc). I am glad to send in Zofran 4 mg #30 sig 1 tab tid prn nausea. If she is not any better I can see her Thursday. Please send in Zofran

## 2022-11-29 NOTE — TELEPHONE ENCOUNTER
Caller: Xu Liza RUIZ    Relationship: Self    Best call back number:[phone]    What is the best time to reach you: ANY  Who are you requesting to speak with (clinical staff, provider,  specific staff member):  CLINICAL     What was the call regarding: PATIENT WOKE UP THIS MORNING WITH DIARRHEA AND VOMITING AT 2:30 AM UNTIL 9:30A.M. SHE IS DRINKING WATER, GINGER ALE, ICE CUBES AND LITTLE BIT OF TEA.  SHE WANTS DOCTORS SUGGESTION ON WHAT TO DO   PATIENT STATES SHE SO COLD    Do you require a callback: YES

## 2022-12-01 NOTE — PROGRESS NOTES
Subjective   Chief Complaint   Patient presents with   • Diarrhea   • Fever   • Vomiting     Started Tuesday morning       History of Present Illness   72 y.o. female presents with cc nausea and vomiting that started 2 days ago. Able to keep down fluids. Zofran sent in 11/29.     Denies fever or chills for 2 days. Tmax was 101.9. Mild nausea. No vomiting for 2 days. Small BM today x 3. Imodium slowed it down. Abdominal pain much better. She thinks she may have had food poisoning.     Started eating again yesterday. Drinking Ginger Ale.      Patient Active Problem List   Diagnosis   • Degeneration of intervertebral disc   • Hyperlipidemia   • Hypothyroidism   • Obstructive sleep apnea syndrome   • Osteoarthritis of knee   • Paroxysmal atrial fibrillation (HCC)   • Vitamin D deficiency   • IFG (impaired fasting glucose)   • Essential hypertension   • Body mass index (BMI) of 40.0-44.9 in adult (HCC)   • Other fatigue   • Diastolic dysfunction       Allergies   Allergen Reactions   • Iodinated Diagnostic Agents Itching   • Iodine Itching   • Tartrazine Itching     Cat scan dye   • Hydrochlorothiazide Other (See Comments)     weakness   • Spironolactone Other (See Comments)     Breast pain/enlargement, soreness, dry mouth       Current Outpatient Medications on File Prior to Visit   Medication Sig Dispense Refill   • Cholecalciferol (VITAMIN D) 2000 units capsule Take 4,000 Units by mouth Daily.     • coenzyme Q10 100 MG capsule Take 200 mg by mouth Every Night.     • dilTIAZem CD (CARDIZEM CD) 300 MG 24 hr capsule Take 1 capsule by mouth Daily. 90 capsule 3   • levothyroxine (SYNTHROID, LEVOTHROID) 150 MCG tablet Daily. 7 DAYS A WEEK ONLY TAKE 150 MCG SYNTHROID.     • olmesartan (BENICAR) 40 MG tablet Take 1 tablet by mouth Daily. 90 tablet 1   • rosuvastatin (CRESTOR) 20 MG tablet Take 1 tablet by mouth Daily. 90 tablet 3   • vitamin B-12 (CYANOCOBALAMIN) 500 MCG tablet Take 500 mcg by mouth Daily.     • rivaroxaban  (XARELTO) 20 MG tablet Take 20 mg by mouth Daily.       No current facility-administered medications on file prior to visit.       Past Medical History:   Diagnosis Date   • Abnormal stress test     Stress echo on 8/21/14 showed hypokinesis of the mid anteroseptum and distal anterior wall.   • Allergy to IVP dye     Severe itching   • Atherosclerosis     Atherosclerosis of abdominal aorta   • CAD (coronary artery disease) 09/2014    Mild, nonobstructive   • Gastric ulcer    • GERD (gastroesophageal reflux disease)    • Hemangioma     Hemangioma of liver (right lobe)   • Hyperlipidemia    • Hypothyroidism     History of Hashimoto's and goiter - s/p total thyroidectomy in 11/12   • Migraine    • Mitral valve insufficiency     Moderate to severe by RAUL on 9/4/14.  Mild to moderate by echo on 10/28/15.   • Non-rheumatic tricuspid valve insufficiency    • Obstructive sleep apnea syndrome    • Osteopenia        Family History   Problem Relation Age of Onset   • Goiter Mother    • Pancreatic cancer Mother    • Heart failure Father    • Hypertension Father    • Diabetes Sister    • Coronary artery disease Sister         Sister with 3 vessel CABG at age 62   • Heart disease Sister    • No Known Problems Maternal Grandmother    • No Known Problems Maternal Grandfather    • Diabetes Paternal Grandmother    • No Known Problems Paternal Grandfather        Social History     Socioeconomic History   • Marital status:    Tobacco Use   • Smoking status: Never   • Smokeless tobacco: Never   Substance and Sexual Activity   • Alcohol use: Not Currently     Comment: Caffeine use: 2 cups daily   • Drug use: No   • Sexual activity: Defer       Past Surgical History:   Procedure Laterality Date   • CHOLECYSTECTOMY  1992   • EYE SURGERY      cataract surgery   • THYROID SURGERY  06/07/2012   • UTERINE FIBROID SURGERY      Several uterine ablation surgeries        The following portions of the patient's history were reviewed and  updated as appropriate: problem list, allergies, current medications, past medical history and past social history.    Review of Systems    Immunization History   Administered Date(s) Administered   • COVID-19 (PFIZER) PURPLE CAP 02/25/2021, 03/18/2021, 09/29/2021   • FLUAD TRI 65YR+ 09/01/2018   • FluMist 2-49yrs 10/30/2015   • Fluad Quad 65+ 09/21/2020, 10/13/2021   • Fluzone High Dose =>65 Years ([employer] ONLY) 09/01/2018, 10/02/2019   • Hepatitis A 04/26/2018   • Influenza TIV (IM) 09/21/2016   • Influenza, Unspecified 10/18/2017   • Pneumococcal Conjugate 13-Valent (PCV13) 10/04/2016   • Pneumococcal Polysaccharide (PPSV23) 07/03/2019   • Shingrix 07/23/2019, 09/21/2019   • Tdap 01/01/2011   • Zostavax 02/25/2010       Objective   Vitals:    12/02/22 1308 12/02/22 1329   BP:  122/74   Pulse: 100 92   Resp:  16   Temp: 98.3 °F (36.8 °C)    SpO2: 96%      There is no height or weight on file to calculate BMI.  Physical Exam  Vitals reviewed.   Constitutional:       Appearance: Normal appearance. She is well-developed.   HENT:      Head: Normocephalic and atraumatic.      Mouth/Throat:      Mouth: Mucous membranes are moist.      Pharynx: Oropharynx is clear. No oropharyngeal exudate or posterior oropharyngeal erythema.   Cardiovascular:      Rate and Rhythm: Normal rate and regular rhythm.      Heart sounds: Normal heart sounds, S1 normal and S2 normal.   Pulmonary:      Effort: Pulmonary effort is normal.      Breath sounds: Normal breath sounds.   Abdominal:      General: Bowel sounds are normal. There is no distension.      Palpations: Abdomen is soft.      Tenderness: There is no abdominal tenderness.   Skin:     General: Skin is warm and dry.   Neurological:      Mental Status: She is alert.   Psychiatric:         Mood and Affect: Mood normal.         Behavior: Behavior normal.         Procedures    Assessment & Plan   Diagnoses and all orders for this visit:    1. Acute gastroenteritis  (Primary)  Comments:  Rapid COVID and flu negative. Feeling better. Zofran sent to pharmacy earlier this week. Advance diet as tolerated. Call with any changes. Should symptoms recur she will need stat labs:CBC, CMP, Lipase, and UA.   Orders:  -     POCT SARS-CoV-2 Antigen ASIA + Flu    Return for Next scheduled follow up.

## 2022-12-02 ENCOUNTER — OFFICE VISIT (OUTPATIENT)
Dept: INTERNAL MEDICINE | Facility: CLINIC | Age: 72
End: 2022-12-02

## 2022-12-02 VITALS
DIASTOLIC BLOOD PRESSURE: 74 MMHG | HEART RATE: 92 BPM | TEMPERATURE: 98.3 F | SYSTOLIC BLOOD PRESSURE: 122 MMHG | OXYGEN SATURATION: 96 % | RESPIRATION RATE: 16 BRPM

## 2022-12-02 DIAGNOSIS — R11.0 NAUSEA: Primary | ICD-10-CM

## 2022-12-02 DIAGNOSIS — K52.9 ACUTE GASTROENTERITIS: Primary | ICD-10-CM

## 2022-12-02 LAB
EXPIRATION DATE: NORMAL
FLUAV AG UPPER RESP QL IA.RAPID: NOT DETECTED
FLUBV AG UPPER RESP QL IA.RAPID: NOT DETECTED
INTERNAL CONTROL: NORMAL
Lab: NORMAL
SARS-COV-2 AG UPPER RESP QL IA.RAPID: NOT DETECTED

## 2022-12-02 PROCEDURE — 99213 OFFICE O/P EST LOW 20 MIN: CPT | Performed by: NURSE PRACTITIONER

## 2022-12-02 PROCEDURE — 87428 SARSCOV & INF VIR A&B AG IA: CPT | Performed by: NURSE PRACTITIONER

## 2022-12-02 RX ORDER — ONDANSETRON 4 MG/1
4 TABLET, FILM COATED ORAL EVERY 8 HOURS PRN
Qty: 30 TABLET | Refills: 0 | Status: SHIPPED | OUTPATIENT
Start: 2022-12-02 | End: 2023-01-17

## 2022-12-07 DIAGNOSIS — I10 PRIMARY HYPERTENSION: ICD-10-CM

## 2022-12-07 RX ORDER — OLMESARTAN MEDOXOMIL 40 MG/1
TABLET ORAL
Qty: 90 TABLET | Refills: 3 | Status: SHIPPED | OUTPATIENT
Start: 2022-12-07

## 2023-01-17 PROBLEM — E66.01 MORBID OBESITY WITH BODY MASS INDEX (BMI) OF 40.0 OR HIGHER: Chronic | Status: ACTIVE | Noted: 2019-09-09

## 2023-01-17 NOTE — PROGRESS NOTES
The ABCs of the Annual Wellness Visit  Initial Medicare Wellness Visit    Subjective     Liza Mcnair is a 72 y.o. female who presents for an Initial Medicare Wellness Visit.    The following portions of the patient's history were reviewed and   updated as appropriate: allergies, current medications, past family history, past medical history, past social history, past surgical history and problem list.     Compared to one year ago, the patient feels her physical   health is the same.    Compared to one year ago, the patient feels her mental   health is the same.    Recent Hospitalizations:  She was not admitted to the hospital during the last year.       Current Medical Providers:  Patient Care Team:  Peggy Carias APRN as PCP - General (Family Medicine)  Kelly Hodges MD as Consulting Physician (Cardiology)  Elsa Bloom MD as Consulting Physician (Endocrinology)  Neftaly Cooley Jr., MD as Consulting Physician (Pulmonary Disease)  Tamiko De Leon MD as Consulting Physician (Ophthalmology)  Rossana Manzano MD as Consulting Physician (Dermatology)  Diana Cooper MD as Consulting Physician (Gynecology)    Outpatient Medications Prior to Visit   Medication Sig Dispense Refill   • Cholecalciferol (VITAMIN D3 PO) Take 200 mg by mouth 2 (Two) Times a Day.     • coenzyme Q10 100 MG capsule Take 200 mg by mouth Every Night.     • dilTIAZem CD (CARDIZEM CD) 300 MG 24 hr capsule Take 1 capsule by mouth Daily. 90 capsule 3   • levothyroxine (SYNTHROID, LEVOTHROID) 150 MCG tablet Daily. 7 DAYS A WEEK ONLY TAKE 150 MCG SYNTHROID.     • olmesartan (BENICAR) 40 MG tablet TAKE 1 TABLET DAILY 90 tablet 3   • rosuvastatin (CRESTOR) 20 MG tablet Take 1 tablet by mouth Daily. 90 tablet 3   • vitamin B-12 (CYANOCOBALAMIN) 500 MCG tablet Take 500 mcg by mouth Daily.     • Cholecalciferol (VITAMIN D) 2000 units capsule Take 4,000 Units by mouth Daily.     • rivaroxaban (XARELTO) 20 MG tablet Take 20 mg  "by mouth Daily.     • ondansetron (Zofran) 4 MG tablet Take 1 tablet by mouth Every 8 (Eight) Hours As Needed for Nausea or Vomiting. 30 tablet 0     No facility-administered medications prior to visit.       No opioid medication identified on active medication list. I have reviewed chart for other potential  high risk medication/s and harmful drug interactions in the elderly.          Aspirin is not on active medication list.  Aspirin use is not indicated based on review of current medical condition/s. Risk of harm outweighs potential benefits.  .    Patient Active Problem List   Diagnosis   • Degeneration of intervertebral disc   • Hyperlipidemia   • Hypothyroidism   • Obstructive sleep apnea syndrome   • Osteoarthritis of knee   • Paroxysmal atrial fibrillation (HCC)   • Vitamin D deficiency   • Prediabetes   • Essential hypertension   • Morbid obesity with body mass index (BMI) of 40.0 or higher (HCC)   • Other fatigue   • Diastolic dysfunction     Advance Care Planning  Advance Directive is not on file.  ACP discussion was held with the patient during this visit. Patient does not have an advance directive, declines further assistance.       Objective    Vitals:    01/19/23 1258   BP: 130/72   Pulse: 92   Resp: 16   Temp: 97.3 °F (36.3 °C)   TempSrc: Temporal   Weight: 103 kg (227 lb 12.8 oz)   Height: 157.5 cm (62\")     Estimated body mass index is 41.67 kg/m² as calculated from the following:    Height as of this encounter: 157.5 cm (62\").    Weight as of this encounter: 103 kg (227 lb 12.8 oz).    Class 3 Severe Obesity (BMI >=40). Obesity-related health conditions include the following: obstructive sleep apnea, hypertension, coronary heart disease, diabetes mellitus, dyslipidemias and GERD. Obesity is unchanged. BMI is is above average; BMI management plan is completed. We discussed portion control and increasing exercise.      Does the patient have evidence of cognitive impairment?   No    Lab Results "   Component Value Date    CHLPL 162 01/12/2023    TRIG 56 01/12/2023    HDL 76 (H) 01/12/2023    LDL 75 01/12/2023    VLDL 11 01/12/2023    HGBA1C 5.70 (H) 01/12/2023    HGBA1C 5.80 (H) 10/31/2022        HEALTH RISK ASSESSMENT    Smoking Status:  Social History     Tobacco Use   Smoking Status Never   Smokeless Tobacco Never     Alcohol Consumption:  Social History     Substance and Sexual Activity   Alcohol Use Not Currently    Comment: Caffeine use: 2 cups daily     Fall Risk Screen:    STEADI Fall Risk Assessment was completed, and patient is at LOW risk for falls.Assessment completed on:1/19/2023    Depression Screen:   PHQ-2/PHQ-9 Depression Screening 1/19/2023   Little Interest or Pleasure in Doing Things 0-->not at all   Feeling Down, Depressed or Hopeless 0-->not at all   PHQ-9: Brief Depression Severity Measure Score 0       Health Habits and Functional and Cognitive Screening:  Functional & Cognitive Status 1/19/2023   Do you have difficulty preparing food and eating? No   Do you have difficulty bathing yourself, getting dressed or grooming yourself? No   Do you have difficulty using the toilet? No   Do you have difficulty moving around from place to place? No   Do you have trouble with steps or getting out of a bed or a chair? No   Current Diet Limited Junk Food   Dental Exam Up to date   Eye Exam Up to date   Exercise (times per week) 5 times per week   Current Exercises Include House Cleaning        Exercise Comment -   Do you need help using the phone?  No   Are you deaf or do you have serious difficulty hearing?  No   Do you need help with transportation? No   Do you need help shopping? No   Do you need help preparing meals?  No   Do you need help with housework?  No   Do you need help with laundry? No   Do you need help taking your medications? No   Do you need help managing money? No   Do you ever drive or ride in a car without wearing a seat belt? No   Have you felt unusual stress, anger or  loneliness in the last month? No   Who do you live with? Alone   If you need help, do you have trouble finding someone available to you? No   Have you been bothered in the last four weeks by sexual problems? No   Do you have difficulty concentrating, remembering or making decisions? No       Age-appropriate Screening Schedule:  Refer to the list below for future screening recommendations based on patient's age, sex and/or medical conditions. Orders for these recommended tests are listed in the plan section. The patient has been provided with a written plan.    Health Maintenance   Topic Date Due   • ZOSTER VACCINE (2 of 3) 04/22/2010   • TDAP/TD VACCINES (2 - Td or Tdap) 01/01/2021   • LIPID PANEL  01/12/2024   • MAMMOGRAM  02/02/2024   • DXA SCAN  02/10/2024   • INFLUENZA VACCINE  Completed          CMS Preventative Services Quick Reference  Risk Factors Identified During Encounter    Immunizations Discussed/Encouraged: Tdap    The above risks/problems have been discussed with the patient.  Pertinent information has been shared with the patient in the After Visit Summary.  An After Visit Summary and PPPS were made available to the patient.  Diagnoses and all orders for this visit:    1. Medicare annual wellness visit, subsequent (Primary)  Comments:  Living Will advised as is Tdap at local pharmacy. Weight loss via dietary changes and 150 minutes weekly aerobic exercise advised.     2. Essential hypertension  Comments:  Controlled. Continue current medications. RTO 6 months.     3. Mixed hyperlipidemia  Comments:  Given H/O CAD, LDL goal less than 70. Current LDL 75--she wishes to workon LSM given myalgias with Crestor.   Orders:  -     Lipid Panel With / Chol / HDL Ratio; Future    4. Prediabetes  Comments:  Stable with A1C 5.7%. Weight loss, dietary changes and exercise advised.     5. Morbid obesity with body mass index (BMI) of 40.0 or higher ([facility])  Comments:  Weight loss via dietary changes and 150 minutes  "weekly aerobic exercise advised.     6. Paroxysmal atrial fibrillation (HCC)  Comments:  Followed by Dr. Hodges and Lanette Busby. Anticoagulated with Xarelto. Rate controlled with Cardizem.     7. Atherosclerosis of native coronary artery of native heart with stable angina pectoris (HCC)  Comments:  Followed by Dr. Hodges.     8. Acquired hypothyroidism  Comments:  Followed by Dr. Bloom.     9. Obstructive sleep apnea syndrome  Comments:  Using CPAP.     10. Colon cancer screening  Comments:  Schedule with Dr. Nair.   Orders:  -     Ambulatory Referral For Screening Colonoscopy      Follow Up:  Next Medicare Wellness visit to be scheduled in 1 year.        Additional E&M Note during same encounter follows:  Patient has multiple medical problems which are significant and separately identifiable that require additional work above and beyond the Medicare Wellness Visit.      Chief Complaint  Medicare Wellness-subsequent, Hypertension, Prediabetes (/), and Hyperlipidemia    Subjective        HPI  Liza Mcnair is also being seen today for HTN, obesity and HLD. She has made dietary changes; eating more salad. Cutting back on bread. Tolerating CPAP well--she started it 2 weeks ago; she is currently using it 5 hrs a night. Seeing Dr. Anguiano in March. Denies chest pain. Dyspnea unchanged. Recent follow up with Lanette Busby. No longer doing water aerobics.          Objective   Vital Signs:  /72   Pulse 92   Temp 97.3 °F (36.3 °C) (Temporal)   Resp 16   Ht 157.5 cm (62\")   Wt 103 kg (227 lb 12.8 oz)   BMI 41.67 kg/m²     Physical Exam  Cardiovascular:      Comments: Irregularly irregular.                         Assessment and Plan   Diagnoses and all orders for this visit:    1. Medicare annual wellness visit, subsequent (Primary)  Comments:  Living Will advised as is Tdap at local pharmacy. Weight loss via dietary changes and 150 minutes weekly aerobic exercise advised.     2. Essential " hypertension  Comments:  Controlled. Continue current medications. RTO 6 months.     3. Mixed hyperlipidemia  Comments:  Given H/O CAD, LDL goal less than 70. Current LDL 75--she wishes to workon LSM given myalgias with Crestor.   Orders:  -     Lipid Panel With / Chol / HDL Ratio; Future    4. Prediabetes  Comments:  Stable with A1C 5.7%. Weight loss, dietary changes and exercise advised.     5. Morbid obesity with body mass index (BMI) of 40.0 or higher (HCC)  Comments:  Weight loss via dietary changes and 150 minutes weekly aerobic exercise advised.     6. Paroxysmal atrial fibrillation (HCC)  Comments:  Followed by Dr. Hodges and Lanette Busby. Anticoagulated with Xarelto. Rate controlled with Cardizem.     7. Atherosclerosis of native coronary artery of native heart with stable angina pectoris (HCC)  Comments:  Followed by Dr. Hodges.     8. Acquired hypothyroidism  Comments:  Followed by Dr. Bloom.     9. Obstructive sleep apnea syndrome  Comments:  Using CPAP.     10. Colon cancer screening  Comments:  Schedule with Dr. Nair.   Orders:  -     Ambulatory Referral For Screening Colonoscopy    Records reviewed include previous OV with myself, Sleep medicine and cardiology as well as labs.          Follow Up   Return in about 6 months (around 7/19/2023) for Lab B4 FUP.  Patient was given instructions and counseling regarding her condition or for health maintenance advice. Please see specific information pulled into the AVS if appropriate.

## 2023-01-19 ENCOUNTER — OFFICE VISIT (OUTPATIENT)
Dept: INTERNAL MEDICINE | Facility: CLINIC | Age: 73
End: 2023-01-19
Payer: MEDICARE

## 2023-01-19 VITALS
HEART RATE: 92 BPM | BODY MASS INDEX: 41.92 KG/M2 | TEMPERATURE: 97.3 F | DIASTOLIC BLOOD PRESSURE: 72 MMHG | RESPIRATION RATE: 16 BRPM | SYSTOLIC BLOOD PRESSURE: 130 MMHG | WEIGHT: 227.8 LBS | HEIGHT: 62 IN

## 2023-01-19 DIAGNOSIS — E66.01 MORBID OBESITY WITH BODY MASS INDEX (BMI) OF 40.0 OR HIGHER: Chronic | ICD-10-CM

## 2023-01-19 DIAGNOSIS — Z12.11 COLON CANCER SCREENING: ICD-10-CM

## 2023-01-19 DIAGNOSIS — E03.9 ACQUIRED HYPOTHYROIDISM: Chronic | ICD-10-CM

## 2023-01-19 DIAGNOSIS — Z00.00 MEDICARE ANNUAL WELLNESS VISIT, SUBSEQUENT: Primary | ICD-10-CM

## 2023-01-19 DIAGNOSIS — I25.118 ATHEROSCLEROSIS OF NATIVE CORONARY ARTERY OF NATIVE HEART WITH STABLE ANGINA PECTORIS: Chronic | ICD-10-CM

## 2023-01-19 DIAGNOSIS — R73.03 PREDIABETES: Chronic | ICD-10-CM

## 2023-01-19 DIAGNOSIS — E78.2 MIXED HYPERLIPIDEMIA: Chronic | ICD-10-CM

## 2023-01-19 DIAGNOSIS — G47.33 OBSTRUCTIVE SLEEP APNEA SYNDROME: Chronic | ICD-10-CM

## 2023-01-19 DIAGNOSIS — I10 ESSENTIAL HYPERTENSION: Chronic | ICD-10-CM

## 2023-01-19 DIAGNOSIS — I48.0 PAROXYSMAL ATRIAL FIBRILLATION: Chronic | ICD-10-CM

## 2023-01-19 PROCEDURE — 1170F FXNL STATUS ASSESSED: CPT | Performed by: NURSE PRACTITIONER

## 2023-01-19 PROCEDURE — 99214 OFFICE O/P EST MOD 30 MIN: CPT | Performed by: NURSE PRACTITIONER

## 2023-01-19 PROCEDURE — 1159F MED LIST DOCD IN RCRD: CPT | Performed by: NURSE PRACTITIONER

## 2023-01-19 PROCEDURE — 1160F RVW MEDS BY RX/DR IN RCRD: CPT | Performed by: NURSE PRACTITIONER

## 2023-01-19 PROCEDURE — G0439 PPPS, SUBSEQ VISIT: HCPCS | Performed by: NURSE PRACTITIONER

## 2023-01-26 ENCOUNTER — PRE-PROCEDURE SCREENING (OUTPATIENT)
Dept: GASTROENTEROLOGY | Facility: CLINIC | Age: 73
End: 2023-01-26
Payer: MEDICARE

## 2023-01-26 NOTE — TELEPHONE ENCOUNTER
LAST SCOPE  IN [facility] --No personal history of polyps--No family history of polyps or colon cancer--XARELTO--Medications:              Cholecalciferol (VITAMIN D) 2000 units capsule  Cholecalciferol (VITAMIN D3 PO)  coenzyme Q10 100 MG capsule    dilTIAZem CD (CARDIZEM CD) 300 MG 24 hr capsule  levothyroxine (SYNTHROID, LEVOTHROID) 150 MCG tablet    olmesartan (BENICAR) 40 MG tablet    rivaroxaban (XARELTO) 20 MG tablet ()    rosuvastatin (CRESTOR) 20 MG tablet  vitamin B-12 (CYANOCOBALAMIN) 500 MCG tablet        QUESTIONNAIRE SCEEENING  HAS BEEN SENT TO DOCTOR FOR REVIEW

## 2023-02-02 ENCOUNTER — TELEPHONE (OUTPATIENT)
Dept: GASTROENTEROLOGY | Facility: CLINIC | Age: 73
End: 2023-02-02
Payer: MEDICARE

## 2023-02-02 DIAGNOSIS — Z12.11 COLON CANCER SCREENING: Primary | ICD-10-CM

## 2023-02-02 RX ORDER — SODIUM CHLORIDE, SODIUM LACTATE, POTASSIUM CHLORIDE, CALCIUM CHLORIDE 600; 310; 30; 20 MG/100ML; MG/100ML; MG/100ML; MG/100ML
30 INJECTION, SOLUTION INTRAVENOUS CONTINUOUS
Status: CANCELLED | OUTPATIENT
Start: 2023-06-14

## 2023-02-02 NOTE — TELEPHONE ENCOUNTER
Case request entered    Can you please reach out to her cardiologist, Dr. Hodges for clearance and to hold her Xarelto 48 hours prior to her colonoscopy.    Thanks.

## 2023-02-02 NOTE — TELEPHONE ENCOUNTER
Dr Lin,  Dr Nair would like to schedule this pt for a colonoscopy and would like to know if it is ok for her to hold her Xarelto for 48 hours prior.  Thanks  Rossana CHACON

## 2023-02-03 ENCOUNTER — TELEPHONE (OUTPATIENT)
Dept: CARDIOLOGY | Facility: CLINIC | Age: 73
End: 2023-02-03
Payer: MEDICARE

## 2023-02-03 NOTE — TELEPHONE ENCOUNTER
Patient called stating she is returning your call and she will be home for the rest of the day.    AR

## 2023-02-03 NOTE — TELEPHONE ENCOUNTER
Patient has paroxysmal atrial fibrillation.  I would recommend holding rivaroxaban 1 day before the colonoscopy.    I left a message for patient to return my call.

## 2023-02-03 NOTE — TELEPHONE ENCOUNTER
24 hours off of anticoagulation is insufficient for the potential of snare polypectomy.  48 hours is the current recommendation with resumption the next day.  If this cannot be done safely, can you facilitate bridging with Lovenox and the Lovenox can be held 24 hours prior?

## 2023-02-06 ENCOUNTER — TELEPHONE (OUTPATIENT)
Dept: GASTROENTEROLOGY | Facility: CLINIC | Age: 73
End: 2023-02-06
Payer: MEDICARE

## 2023-02-06 PROBLEM — Z12.11 COLON CANCER SCREENING: Status: ACTIVE | Noted: 2023-02-06

## 2023-02-06 NOTE — TELEPHONE ENCOUNTER
LILIA Torres for colonoscopy on 6/14/23  arrive at 8am   . Mailed Prep instructions to Mailing address on-file. [facility]

## 2023-02-06 NOTE — TELEPHONE ENCOUNTER
Hold xarelto 48 hours before procedure.  I discussed with Dr. Nascimento-she recommended not bridging with enoxaparin.    I will inform patient that she is at potential risk of stroke when holding rivaroxaban if she has any strokelike symptoms, she needs to present to the ED immediately.  Message left for patient to return my call.

## 2023-02-07 ENCOUNTER — TELEPHONE (OUTPATIENT)
Dept: CARDIOLOGY | Facility: CLINIC | Age: 73
End: 2023-02-07
Payer: MEDICARE

## 2023-02-07 NOTE — TELEPHONE ENCOUNTER
Patient called stating she has been playing phone tag trying to return your calls. Patient states she will be home all day today. Patient states she calling regarding medication and a coloscopy.

## 2023-02-07 NOTE — TELEPHONE ENCOUNTER
Hold xarelto 48 hours before colonoscopy.  I discussed with Dr. Nascimento-she recommended not bridging with enoxaparin.     I will inform patient that she is at potential risk of stroke when holding rivaroxaban if she has any strokelike symptoms, she needs to present to the ED immediately.      Patient informed & verbalizes understanding.

## 2023-02-07 NOTE — TELEPHONE ENCOUNTER
Called pt and let her know it is ok for her to hold her xarelto for 48 hours prior to scope in June.  Also discussed risk of CVA and to report to the ER if she has any stroke like symptoms.  Pt verbalized understanding.  Thanks

## 2023-02-15 NOTE — TELEPHONE ENCOUNTER
Pharmacy/Patient is requesting a refill on doxycycline monohydrate (MONODOX) 100 MG capsule  Last seen: 11/10/2022 per note would be willing to try doxycycline since no blood work monitoring involved and no risk of facial hair.  We reviewed that the doxycycline would be used for its anti-inflammatory effects in attempt to stop the condition, and would not be expected to directly promote hair regrowth.  She would like to proceed.   Next appointment scheduled:3/28/2023  Last refill 11/10/2022 # 60 with 2 refills     Writer to DR Shalini potter to refill doxycycline  for a month if so sign RX     Received labs and I have placed them in your in box for review

## 2023-02-22 ENCOUNTER — LAB (OUTPATIENT)
Dept: LAB | Facility: HOSPITAL | Age: 73
End: 2023-02-22
Payer: MEDICARE

## 2023-02-22 ENCOUNTER — TRANSCRIBE ORDERS (OUTPATIENT)
Dept: ADMINISTRATIVE | Facility: HOSPITAL | Age: 73
End: 2023-02-22
Payer: MEDICARE

## 2023-02-22 DIAGNOSIS — R73.01 IMPAIRED FASTING GLUCOSE: ICD-10-CM

## 2023-02-22 DIAGNOSIS — E89.0 POSTSURGICAL HYPOTHYROIDISM: ICD-10-CM

## 2023-02-22 DIAGNOSIS — I10 ESSENTIAL HYPERTENSION, BENIGN: ICD-10-CM

## 2023-02-22 DIAGNOSIS — R68.89 OTHER GENERAL SYMPTOMS AND SIGNS: ICD-10-CM

## 2023-02-22 DIAGNOSIS — E78.2 MIXED HYPERLIPIDEMIA: ICD-10-CM

## 2023-02-22 DIAGNOSIS — E78.2 MIXED HYPERLIPIDEMIA: Primary | ICD-10-CM

## 2023-02-22 LAB
ALBUMIN SERPL-MCNC: 4.4 G/DL (ref 3.5–5.2)
ALBUMIN/GLOB SERPL: 1.6 G/DL
ALP SERPL-CCNC: 74 U/L (ref 39–117)
ALT SERPL W P-5'-P-CCNC: 20 U/L (ref 1–33)
ANION GAP SERPL CALCULATED.3IONS-SCNC: 8.8 MMOL/L (ref 5–15)
AST SERPL-CCNC: 24 U/L (ref 1–32)
BILIRUB SERPL-MCNC: 1 MG/DL (ref 0–1.2)
BUN SERPL-MCNC: 12 MG/DL (ref 8–23)
BUN/CREAT SERPL: 12.9 (ref 7–25)
CALCIUM SPEC-SCNC: 9.5 MG/DL (ref 8.6–10.5)
CHLORIDE SERPL-SCNC: 103 MMOL/L (ref 98–107)
CO2 SERPL-SCNC: 26.2 MMOL/L (ref 22–29)
CREAT SERPL-MCNC: 0.93 MG/DL (ref 0.57–1)
EGFRCR SERPLBLD CKD-EPI 2021: 65 ML/MIN/1.73
GLOBULIN UR ELPH-MCNC: 2.8 GM/DL
GLUCOSE SERPL-MCNC: 100 MG/DL (ref 65–99)
HBA1C MFR BLD: 5.7 % (ref 4.8–5.6)
POTASSIUM SERPL-SCNC: 4.1 MMOL/L (ref 3.5–5.2)
PROT SERPL-MCNC: 7.2 G/DL (ref 6–8.5)
SODIUM SERPL-SCNC: 138 MMOL/L (ref 136–145)
T3 SERPL-MCNC: 102 NG/DL (ref 80–200)
T4 FREE SERPL-MCNC: 1.73 NG/DL (ref 0.93–1.7)
TSH SERPL DL<=0.05 MIU/L-ACNC: 3.62 UIU/ML (ref 0.27–4.2)

## 2023-02-22 PROCEDURE — 80053 COMPREHEN METABOLIC PANEL: CPT

## 2023-02-22 PROCEDURE — 84439 ASSAY OF FREE THYROXINE: CPT

## 2023-02-22 PROCEDURE — 84480 ASSAY TRIIODOTHYRONINE (T3): CPT

## 2023-02-22 PROCEDURE — 84443 ASSAY THYROID STIM HORMONE: CPT

## 2023-02-22 PROCEDURE — 36415 COLL VENOUS BLD VENIPUNCTURE: CPT

## 2023-02-22 PROCEDURE — 83036 HEMOGLOBIN GLYCOSYLATED A1C: CPT

## 2023-04-19 NOTE — PROGRESS NOTES
Subjective   Chief Complaint   Patient presents with   • Anticoagulation   • Knee Pain       History of Present Illness   72 y.o. female presents with cc concerns regarding anticoagulation as she is scheduled for CLS in June with Dr. Nair. She was advised Xarelto would need to be held. GI had discussed bridging with Lovenox. Cardiology did not feel it was needed. She is worried and upset  Because the last person she spoke with regarding it advised holding Xarelto 48 hrs prior to CLS and reminded her she could have a stroke during the time she is holding Xarelto. She is scared and tearful. She would like to discuss Cologuard instead. She does not have a FH CRC and has never had a polyp. Denies hematochezia or melena. Last CLS with Dr. Desouza in 12/2013 that was negative.     Considering knee replacement. She has put this off 3-4 years as she was taking care of her  who passed away next year; she was bone on bone at that time. She has several orthopedists she is considering and would like guidance.      Recent follow up with sleep medicine; using CPAP nightly with improvement.      Patient Active Problem List   Diagnosis   • Degeneration of intervertebral disc   • Hyperlipidemia   • Hypothyroidism   • Obstructive sleep apnea syndrome   • Osteoarthritis of knee   • Paroxysmal atrial fibrillation   • Vitamin D deficiency   • Prediabetes   • Essential hypertension   • Morbid obesity with body mass index (BMI) of 40.0 or higher (HCC)   • Other fatigue   • Diastolic dysfunction   • Colon cancer screening       Allergies   Allergen Reactions   • Iodinated Contrast Media Itching   • Iodine Itching   • Tartrazine Itching     Cat scan dye   • Hydrochlorothiazide Other (See Comments)     weakness   • Spironolactone Other (See Comments)     Breast pain/enlargement, soreness, dry mouth       Current Outpatient Medications on File Prior to Visit   Medication Sig Dispense Refill   • Cholecalciferol (VITAMIN D3 PO) Take  200 mg by mouth 2 (Two) Times a Day.     • coenzyme Q10 100 MG capsule Take 2 capsules by mouth Every Night.     • dilTIAZem CD (CARDIZEM CD) 300 MG 24 hr capsule Take 1 capsule by mouth Daily. 90 capsule 3   • levothyroxine (SYNTHROID, LEVOTHROID) 150 MCG tablet Daily. 7 DAYS A WEEK ONLY TAKE 150 MCG SYNTHROID.     • MAGNESIUM PO Take 200 capsules by mouth.     • olmesartan (BENICAR) 40 MG tablet TAKE 1 TABLET DAILY 90 tablet 3   • rivaroxaban (XARELTO) 20 MG tablet Take 1 tablet by mouth Daily.     • rosuvastatin (CRESTOR) 20 MG tablet Take 1 tablet by mouth Daily. 90 tablet 3   • vitamin B-12 (CYANOCOBALAMIN) 500 MCG tablet Take 1 tablet by mouth Daily.     • rivaroxaban (XARELTO) 20 MG tablet Take 1 tablet by mouth Daily.       No current facility-administered medications on file prior to visit.       Past Medical History:   Diagnosis Date   • Abnormal stress test     Stress echo on 8/21/14 showed hypokinesis of the mid anteroseptum and distal anterior wall.   • Allergy to IVP dye     Severe itching   • Atherosclerosis     Atherosclerosis of abdominal aorta   • CAD (coronary artery disease) 09/2014    Mild, nonobstructive   • Gastric ulcer    • GERD (gastroesophageal reflux disease)    • Hemangioma     Hemangioma of liver (right lobe)   • Hyperlipidemia    • Hypothyroidism     History of Hashimoto's and goiter - s/p total thyroidectomy in 11/12   • Migraine    • Mitral valve insufficiency     Moderate to severe by RAUL on 9/4/14.  Mild to moderate by echo on 10/28/15.   • Non-rheumatic tricuspid valve insufficiency    • Obstructive sleep apnea syndrome    • Osteopenia        Family History   Problem Relation Age of Onset   • Goiter Mother    • Pancreatic cancer Mother    • Heart failure Father    • Hypertension Father    • Diabetes Sister    • Coronary artery disease Sister         Sister with 3 vessel CABG at age 62   • Heart disease Sister    • No Known Problems Maternal Grandmother    • No Known Problems  "Maternal Grandfather    • Diabetes Paternal Grandmother    • No Known Problems Paternal Grandfather        Social History     Socioeconomic History   • Marital status:    Tobacco Use   • Smoking status: Never   • Smokeless tobacco: Never   Substance and Sexual Activity   • Alcohol use: Not Currently     Comment: Caffeine use: 2 cups daily   • Drug use: No   • Sexual activity: Defer       Past Surgical History:   Procedure Laterality Date   • CHOLECYSTECTOMY  1992   • EYE SURGERY      cataract surgery   • THYROID SURGERY  06/07/2012   • UTERINE FIBROID SURGERY      Several uterine ablation surgeries        The following portions of the patient's history were reviewed and updated as appropriate: problem list, allergies, current medications, past medical history, past social history and past surgical history.    Review of Systems    Immunization History   Administered Date(s) Administered   • COVID-19 (PFIZER) PURPLE CAP 02/25/2021, 03/18/2021, 09/29/2021   • FLUAD TRI 65YR+ 09/01/2018   • FluMist 2-49yrs 10/30/2015   • Fluad Quad 65+ 09/21/2020, 10/13/2021   • Fluzone High Dose =>65 Years ([employer] ONLY) 09/01/2018, 10/02/2019   • Fluzone High-Dose 65+yrs 09/27/2022   • Hepatitis A 04/26/2018   • Influenza TIV (IM) 09/21/2016   • Influenza, Unspecified 10/18/2017   • Pneumococcal Conjugate 13-Valent (PCV13) 10/04/2016   • Pneumococcal Polysaccharide (PPSV23) 07/03/2019   • Tdap 01/01/2011   • Zostavax 02/25/2010       Objective   Vitals:    04/20/23 1413   BP: 124/66   Pulse: 88   Resp: 20   Temp: 97.1 °F (36.2 °C)   Weight: 103 kg (226 lb)   Height: 157.5 cm (62.01\")     Body mass index is 41.33 kg/m².  Physical Exam  Constitutional:       Appearance: Normal appearance. She is obese.   HENT:      Head: Normocephalic and atraumatic.   Cardiovascular:      Comments: Irregularly irregular.   Pulmonary:      Effort: Pulmonary effort is normal.      Breath sounds: Normal breath sounds.   Musculoskeletal:      " Comments: Ambulates with cane.    Neurological:      Mental Status: She is alert.   Psychiatric:         Mood and Affect: Mood normal.         Behavior: Behavior normal.      Comments: Tearful.          Procedures    Assessment & Plan   Diagnoses and all orders for this visit:    1. Chronic anticoagulation (Primary)  Comments:  Continues Xarelto daily. She is very feardul regarding risk of stroke should she hold this for CLS.     2. Paroxysmal atrial fibrillation  Comments:  Followed by Dr. Hodges and Lanette LE.      3. Colon cancer screening  Comments:  Cologuard preferred at this time. If positive then she will need CLS with Dr. Nair.   Orders:  -     Cologuard - Stool, Per Rectum; Future    4. Osteoarthritis of both knees, unspecified osteoarthritis type  Comments:  Considering Dr. Suarez, Dr. Piña or Dr. Tena. Encouragement and guidance offered. She will let me know what she decides.     5. Essential hypertension  Comments:  Controlled. Continue current medications.     6. Obstructive sleep apnea syndrome  Comments:  Using CPAP nightly.     Records reviewed include previous OV with myself as well as labs.     Return for Next scheduled follow up.

## 2023-04-20 ENCOUNTER — TELEPHONE (OUTPATIENT)
Dept: INTERNAL MEDICINE | Facility: CLINIC | Age: 73
End: 2023-04-20

## 2023-04-20 ENCOUNTER — OFFICE VISIT (OUTPATIENT)
Dept: INTERNAL MEDICINE | Facility: CLINIC | Age: 73
End: 2023-04-20
Payer: MEDICARE

## 2023-04-20 VITALS
SYSTOLIC BLOOD PRESSURE: 124 MMHG | RESPIRATION RATE: 20 BRPM | TEMPERATURE: 97.1 F | BODY MASS INDEX: 41.59 KG/M2 | DIASTOLIC BLOOD PRESSURE: 66 MMHG | HEART RATE: 88 BPM | WEIGHT: 226 LBS | HEIGHT: 62 IN

## 2023-04-20 DIAGNOSIS — I48.0 PAROXYSMAL ATRIAL FIBRILLATION: ICD-10-CM

## 2023-04-20 DIAGNOSIS — M17.0 OSTEOARTHRITIS OF BOTH KNEES, UNSPECIFIED OSTEOARTHRITIS TYPE: ICD-10-CM

## 2023-04-20 DIAGNOSIS — Z12.11 COLON CANCER SCREENING: ICD-10-CM

## 2023-04-20 DIAGNOSIS — G47.33 OBSTRUCTIVE SLEEP APNEA SYNDROME: Chronic | ICD-10-CM

## 2023-04-20 DIAGNOSIS — Z79.01 CHRONIC ANTICOAGULATION: Primary | ICD-10-CM

## 2023-04-20 DIAGNOSIS — I10 ESSENTIAL HYPERTENSION: Chronic | ICD-10-CM

## 2023-04-20 NOTE — TELEPHONE ENCOUNTER
Provider: APRN BRATTON    Caller: RONI BLOOM    Reason for Call: PATIENT STATES THAT THE [facility] CONTACTED HER AND ON 02/25/2010 SHE RECEIVED THE ZOSTER LIVE SHINGLES VACCINE.     PATIENT WANTED THAT ADDED TO HER RECORD. NO CALL BACK REQUIRED.

## 2023-05-18 ENCOUNTER — OFFICE VISIT (OUTPATIENT)
Dept: CARDIOLOGY | Facility: CLINIC | Age: 73
End: 2023-05-18
Payer: MEDICARE

## 2023-05-18 VITALS
DIASTOLIC BLOOD PRESSURE: 80 MMHG | HEART RATE: 91 BPM | WEIGHT: 228 LBS | HEIGHT: 62 IN | BODY MASS INDEX: 41.96 KG/M2 | SYSTOLIC BLOOD PRESSURE: 130 MMHG

## 2023-05-18 DIAGNOSIS — I10 ESSENTIAL HYPERTENSION: Chronic | ICD-10-CM

## 2023-05-18 DIAGNOSIS — I48.19 ATRIAL FIBRILLATION, PERSISTENT: ICD-10-CM

## 2023-05-18 DIAGNOSIS — I10 PRIMARY HYPERTENSION: ICD-10-CM

## 2023-05-18 DIAGNOSIS — I48.0 PAROXYSMAL ATRIAL FIBRILLATION: Primary | Chronic | ICD-10-CM

## 2023-05-18 DIAGNOSIS — I51.89 DIASTOLIC DYSFUNCTION: ICD-10-CM

## 2023-05-18 DIAGNOSIS — E78.2 MIXED HYPERLIPIDEMIA: ICD-10-CM

## 2023-05-18 RX ORDER — ROSUVASTATIN CALCIUM 20 MG/1
20 TABLET, COATED ORAL DAILY
Qty: 90 TABLET | Refills: 3 | Status: SHIPPED | OUTPATIENT
Start: 2023-05-18

## 2023-05-18 RX ORDER — DILTIAZEM HYDROCHLORIDE 300 MG/1
300 CAPSULE, COATED, EXTENDED RELEASE ORAL DAILY
Qty: 90 CAPSULE | Refills: 3 | Status: SHIPPED | OUTPATIENT
Start: 2023-05-18

## 2023-05-18 RX ORDER — OLMESARTAN MEDOXOMIL 40 MG/1
40 TABLET ORAL DAILY
Qty: 90 TABLET | Refills: 3 | Status: SHIPPED | OUTPATIENT
Start: 2023-05-18

## 2023-05-18 NOTE — PROGRESS NOTES
Date of Office Visit: 2023  Encounter Provider: Kelly Hodges MD  Place of Service: [facility] CARDIOLOGY  Patient Name: Liza Mcnair  :1950      Patient ID:  Liza Mcnair is a 72 y.o. female is here for  followup for atrial fibrillation, hypertension        History of Present Illness    She has a history of mild nonobstructive coronary artery disease by cardiac catheterization done 2014.  She had atrial fibrillation induced on a stress study and had a normal event recorder done 2015-was not started on anticoagulation at that time.  She has hypothyroidism, osteoarthritis, asthma, hypertension and hyperlipidemia.  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  She sees Dr. Cooley for asthma.      She does not smoke or use alcohol.      She had a stress study on 2019 which showed a very small area of reversibility in the inferoapical segment, possibly very slight ischemia, ejection fraction 61%.  Showed normal 4-day monitor done 2020.  She wore a Zio patch monitor for 4 days done 2020.  This was a normal monitor.       She was diagnosed with atrial fibrillation 2022 and started on Xarelto. She had some hematuria and saw Dr. Indu Garcia, oncology.  She sent her to see Dr. Ortiz from urology did MRI of the abdomen and pelvis, CT abdomen pelvis and cystoscopy,  no cancer was found, no infections were found.  He recommended she come back in a year. She had a nonischemic stress PET study done 2022.  Echocardiogram done 2022 showed ejection fraction of 63% with normal diastolic function, no significant valvular abnormalities.      Labs in 2023 showed glucose 100, otherwise normal CMP, hemoglobin A1c 5.7%, free T4 elevated 1.73 with normal TSH and normal total T3.      She has no chest pain or pressure.  She has no dizziness.  She is using a CPAP as of January  2023 and is doing well on this.  She does not feel her heart racing or skipping.  She has no orthopnea or PND.  She has no exertional dyspnea.  Her energy level stable.  She is taking her medications as directed without difficulty.  She now also sees Dr. Anastasiia German from gastroenterology and Dr. Peggy Krueger nephrology.    Past Medical History:   Diagnosis Date   • Abnormal stress test     Stress echo on 8/21/14 showed hypokinesis of the mid anteroseptum and distal anterior wall.   • Allergy to IVP dye     Severe itching   • Atherosclerosis     Atherosclerosis of abdominal aorta   • Atrial fibrillation    • CAD (coronary artery disease) 09/2014    Mild, nonobstructive   • Gastric ulcer    • GERD (gastroesophageal reflux disease)    • Hemangioma     Hemangioma of liver (right lobe)   • Hyperlipidemia    • Hypertension    • Hypothyroidism     History of Hashimoto's and goiter - s/p total thyroidectomy in 11/12   • Migraine    • Mitral valve insufficiency     Moderate to severe by RAUL on 9/4/14.  Mild to moderate by echo on 10/28/15.   • Mitral valve prolapse    • Non-rheumatic tricuspid valve insufficiency    • Obstructive sleep apnea syndrome    • Osteopenia          Past Surgical History:   Procedure Laterality Date   • CHOLECYSTECTOMY  1992   • EYE SURGERY      cataract surgery   • THYROID SURGERY  06/07/2012   • UTERINE FIBROID SURGERY      Several uterine ablation surgeries        Current Outpatient Medications on File Prior to Visit   Medication Sig Dispense Refill   • Cholecalciferol (VITAMIN D3 PO) Take 200 mg by mouth 2 (Two) Times a Day.     • coenzyme Q10 100 MG capsule Take 2 capsules by mouth Every Night.     • levothyroxine (SYNTHROID, LEVOTHROID) 150 MCG tablet Daily. 7 DAYS A WEEK ONLY TAKE 150 MCG SYNTHROID.     • MAGNESIUM PO Take 200 capsules by mouth.     • vitamin B-12 (CYANOCOBALAMIN) 500 MCG tablet Take 1 tablet by mouth Daily.     • [DISCONTINUED] dilTIAZem CD (CARDIZEM CD) 300 MG 24 hr  "capsule Take 1 capsule by mouth Daily. 90 capsule 3   • [DISCONTINUED] olmesartan (BENICAR) 40 MG tablet TAKE 1 TABLET DAILY 90 tablet 3   • [DISCONTINUED] rivaroxaban (XARELTO) 20 MG tablet Take 1 tablet by mouth Daily.     • [DISCONTINUED] rosuvastatin (CRESTOR) 20 MG tablet Take 1 tablet by mouth Daily. 90 tablet 3   • [DISCONTINUED] rivaroxaban (XARELTO) 20 MG tablet Take 1 tablet by mouth Daily. (Patient not taking: Reported on 5/18/2023)       No current facility-administered medications on file prior to visit.       Social History     Socioeconomic History   • Marital status:    Tobacco Use   • Smoking status: Never     Passive exposure: Never   • Smokeless tobacco: Never   Substance and Sexual Activity   • Alcohol use: Not Currently     Comment: Caffeine use: 2 cups daily   • Drug use: No   • Sexual activity: Not Currently     Partners: Male           ROS    Procedures    ECG 12 Lead    Date/Time: 5/18/2023 2:05 PM  Performed by: Kelly Hodges MD  Authorized by: Kelly Hodges MD   Comparison: compared with previous ECG   Similar to previous ECG  Rhythm: atrial fibrillation  Ectopy: unifocal PVCs    Clinical impression: abnormal EKG                Objective:      Vitals:    05/18/23 1334   BP: 130/80   Pulse: 91   Weight: 103 kg (228 lb)   Height: 157.5 cm (62\")     Body mass index is 41.7 kg/m².    Vitals reviewed.   Constitutional:       General: Not in acute distress.     Appearance: Well-developed. Obese. Not diaphoretic.   Eyes:      General: No scleral icterus.     Conjunctiva/sclera: Conjunctivae normal.   HENT:      Head: Normocephalic and atraumatic.   Neck:      Thyroid: No thyromegaly.      Vascular: No carotid bruit or JVD.      Lymphadenopathy: No cervical adenopathy.   Pulmonary:      Effort: Pulmonary effort is normal. No respiratory distress.      Breath sounds: Normal breath sounds. No wheezing. No rhonchi. No rales.   Chest:      Chest wall: Not tender to palpatation. "   Cardiovascular:      Normal rate. Irregularly irregular rhythm.      Murmurs: There is no murmur.      No gallop.   Pulses:     Intact distal pulses.   Edema:     Peripheral edema absent.   Abdominal:      General: Bowel sounds are normal. There is no distension or abdominal bruit.      Palpations: Abdomen is soft. There is no abdominal mass.      Tenderness: There is no abdominal tenderness.   Musculoskeletal:         General: No deformity.      Extremities: No clubbing present.     Cervical back: Neck supple. Skin:     General: Skin is warm and dry. There is no cyanosis.      Coloration: Skin is not pale.      Findings: No rash.   Neurological:      Mental Status: Alert and oriented to person, place, and time.      Cranial Nerves: No cranial nerve deficit.   Psychiatric:         Judgment: Judgment normal.         Lab Review:       Assessment:      Diagnosis Plan   1. Paroxysmal atrial fibrillation        2. Essential hypertension        3. Mixed hyperlipidemia  rosuvastatin (CRESTOR) 20 MG tablet      4. Diastolic dysfunction        5. Primary hypertension  olmesartan (BENICAR) 40 MG tablet        1. Hypertension, BP goal <120/80.  Well-controlled.  2. Hyperlipidemia, on rosuvastatin.   3. Mild nonobstructive CAD, no angina.   4. Persistent atrial fibrillation,, on rivaroxaban. Her [id number] is 3 giving her 3.2% annual risk of stroke.  Her heart rate is well controlled.  5. LORI is now on CPAP.   6. Hypothyroidism to removal of her thyroid, stable on replacement.  7. Hematuria, follows with urology.  Also sees Peggy Krueger  8. Knee pain, may need knee replacement with orthopedics     Plan:       See Lanette in 6 months, no medication changes, overall doing well.

## 2023-06-02 ENCOUNTER — TELEPHONE (OUTPATIENT)
Dept: GASTROENTEROLOGY | Facility: CLINIC | Age: 73
End: 2023-06-02
Payer: MEDICARE

## 2023-06-02 NOTE — TELEPHONE ENCOUNTER
Caller: Liza Mcnair    Relationship to patient: Self     Best call back number: [phone]    Type of visit: COLONOSCOPY    Additional notes:PT IS CALLING TO CANCEL BECAUSE OF MEDICATIONS. PT WILL CALL BACK TO RESCHEDULE  AT A LATER DATE.

## 2023-06-07 NOTE — TELEPHONE ENCOUNTER
I spoke to pt to confirm scope but she said she needed to cancel and would call us back to reschedule. [place].

## 2023-06-07 NOTE — TELEPHONE ENCOUNTER
Caller: Liza Mcnair    Relationship to patient: Self    Best call back number: [phone]    Patient is needing: PATIENT IS CALLING BACK BECAUSE SHE GOT A TEXT ASKING HER TO CONFIRM HER SCOPE FOR 06/14/23 WHICH SHE DID PREVIOUSLY CALL TO CANCEL. THIS APPT. IS NOT NEEDED AT THE MOMENT AND PATIENT WILL CALL BACK TO RESCHEDULE WHEN READY.

## 2023-11-28 ENCOUNTER — OFFICE VISIT (OUTPATIENT)
Dept: CARDIOLOGY | Facility: CLINIC | Age: 73
End: 2023-11-28
Payer: MEDICARE

## 2023-11-28 VITALS
WEIGHT: 236.6 LBS | HEIGHT: 62 IN | HEART RATE: 90 BPM | DIASTOLIC BLOOD PRESSURE: 90 MMHG | SYSTOLIC BLOOD PRESSURE: 138 MMHG | BODY MASS INDEX: 43.54 KG/M2

## 2023-11-28 DIAGNOSIS — I25.10 CORONARY ARTERY DISEASE INVOLVING NATIVE CORONARY ARTERY OF NATIVE HEART WITHOUT ANGINA PECTORIS: ICD-10-CM

## 2023-11-28 DIAGNOSIS — E66.01 MORBID OBESITY WITH BODY MASS INDEX (BMI) OF 40.0 OR HIGHER: Chronic | ICD-10-CM

## 2023-11-28 DIAGNOSIS — E78.2 MIXED HYPERLIPIDEMIA: ICD-10-CM

## 2023-11-28 DIAGNOSIS — I48.21 PERMANENT ATRIAL FIBRILLATION: Primary | ICD-10-CM

## 2023-11-28 DIAGNOSIS — G47.33 OBSTRUCTIVE SLEEP APNEA SYNDROME: Chronic | ICD-10-CM

## 2023-11-28 DIAGNOSIS — I10 ESSENTIAL HYPERTENSION: Chronic | ICD-10-CM

## 2023-11-28 NOTE — PROGRESS NOTES
Date of Office Visit: 2023  Encounter Provider: MIMI Shelton  Place of Service: [facility] CARDIOLOGY  Patient Name: Liza Mcnair  :1950  Primary Cardiologist: Dr. Kelly Hodges    Chief Complaint   Patient presents with    Follow-up   :     HPI: Liza Mcnair is a 73 y.o. female who presents today for 6-month cardiac follow-up visit.  I reviewed her medical records.    She has known hypertension, hyperlipidemia, hypothyroidism, obesity, and obstructive sleep apnea (compliant with CPAP).    In 2014, she had a Myoview stress test completed and developed atrial fibrillation during the study.  She underwent coronary angiography which revealed nonobstructive CAD.  Follow-up event recorder was normal.    The  left main is normal. The LAD has a 30% proximal stenosis. There is a large  ramus branch that is normal. The circumflex has 20% proximal stenosis. 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  There is a 20% proximal stenosis, otherwise 10% diffuse distal stenosis.     In 2018, echocardiogram showed normal EF and grade 1 diastolic dysfunction.  In 2019, myocardial perfusion study showed a mild abnormality with medical treatment recommended.  In May 2020, 14-day Holter monitor was normal.    In 2022, she had recurrent atrial fibrillation and was started on rivaroxaban. Echocardiogram was normal except for trace to mild mitral regurgitation. Cardiac PET scan was normal.    She presents today for follow-up visit.  She remains in atrial fibrillation with controlled ventricular response.  When she goes up and down steps, she feels short of breath and her heart races at times.  She denies chest pain, PND, orthopnea, edema, dizziness, syncope, or bleeding.      Past Medical History:   Diagnosis Date    Abnormal stress test     Stress echo on 14 showed hypokinesis of the mid anteroseptum  and distal anterior wall.    Allergy to IVP dye     Severe itching    Atherosclerosis     Atherosclerosis of abdominal aorta    CAD (coronary artery disease) 09/2014    Mild, nonobstructive    Gastric ulcer     GERD (gastroesophageal reflux disease)     Hemangioma     Hemangioma of liver (right lobe)    Hyperlipidemia     Hypothyroidism     History of Hashimoto's and goiter - s/p total thyroidectomy in 11/12    Migraine     Mitral valve insufficiency     Moderate to severe by RAUL on 9/4/14.  Mild to moderate by echo on 10/28/15.    Mitral valve prolapse     Non-rheumatic tricuspid valve insufficiency     Obstructive sleep apnea syndrome     Osteopenia        Past Surgical History:   Procedure Laterality Date    CHOLECYSTECTOMY  1992    EYE SURGERY      cataract surgery    THYROID SURGERY  06/07/2012    UTERINE FIBROID SURGERY      Several uterine ablation surgeries        Social History     Socioeconomic History    Marital status:    Tobacco Use    Smoking status: Never     Passive exposure: Never    Smokeless tobacco: Never   Vaping Use    Vaping Use: Never used   Substance and Sexual Activity    Alcohol use: Not Currently     Comment: Caffeine use: 2 cups daily    Drug use: No    Sexual activity: Not Currently     Partners: Male       Family History   Problem Relation Age of Onset    Goiter Mother     Pancreatic cancer Mother     Heart failure Father     Hypertension Father     Diabetes Sister     Coronary artery disease Sister         Sister with 3 vessel CABG at age 62    Heart disease Sister     No Known Problems Maternal Grandmother     No Known Problems Maternal Grandfather     Diabetes Paternal Grandmother     No Known Problems Paternal Grandfather        The following portion of the patient's history were reviewed and updated as appropriate: past medical history, past surgical history, past social history, past family history, allergies, current medications, and problem list.    Review of Systems  "  Constitutional: Negative.   Cardiovascular:  Positive for dyspnea on exertion, leg swelling and palpitations.   Hematologic/Lymphatic: Negative.    Neurological: Negative.        Allergies   Allergen Reactions    Fd&C Yellow #5 (Tartrazine) Itching     Cat scan dye    Iodinated Contrast Media Itching    Iodine Itching    Hydrochlorothiazide Other (See Comments)     weakness    Spironolactone Other (See Comments)     Breast pain/enlargement, soreness, dry mouth         Current Outpatient Medications:     Cholecalciferol (VITAMIN D3 PO), Take 200 mg by mouth 2 (Two) Times a Day., Disp: , Rfl:     coenzyme Q10 100 MG capsule, Take 2 capsules by mouth Every Night., Disp: , Rfl:     dilTIAZem CD (CARDIZEM CD) 300 MG 24 hr capsule, Take 1 capsule by mouth Daily., Disp: 90 capsule, Rfl: 3    levothyroxine (SYNTHROID, LEVOTHROID) 150 MCG tablet, Daily. 7 DAYS A WEEK ONLY TAKE 150 MCG SYNTHROID., Disp: , Rfl:     olmesartan (BENICAR) 40 MG tablet, Take 1 tablet by mouth Daily., Disp: 90 tablet, Rfl: 3    rivaroxaban (XARELTO) 20 MG tablet, Take 1 tablet by mouth Daily With Dinner., Disp: 90 tablet, Rfl: 3    rosuvastatin (CRESTOR) 20 MG tablet, Take 1 tablet by mouth Daily., Disp: 90 tablet, Rfl: 3    vitamin B-12 (CYANOCOBALAMIN) 500 MCG tablet, Take 1 tablet by mouth Daily., Disp: , Rfl:         Objective:     Vitals:    11/28/23 1421   BP: 138/90   BP Location: Left arm   Patient Position: Sitting   Cuff Size: Adult   Pulse: 90   Weight: 107 kg (236 lb 9.6 oz)   Height: 157.5 cm (62.01\")     Body mass index is 43.26 kg/m².    PHYSICAL EXAM:    Vitals Reviewed.   General Appearance: No acute distress, well developed and well nourished. Obese.   HENT: No hearing loss noted.    Respiratory: No signs of respiratory distress. Respiration rhythm and depth normal.  Clear to auscultation.   Cardiovascular:  Jugular Venous Pressure: Normal  Heart Rate and Rhythm: Irregular, irregular.  Heart Sounds: Normal S1 and S2. No S3 or S4 " noted.  Murmurs: No murmurs noted. No rubs, thrills, or gallops.   Lower Extremities: No edema noted.  Musculoskeletal: Normal movement of extremities.  Skin: General appearance normal.    Psychiatric: Patient alert and oriented to person, place, and time. Speech and behavior appropriate. Normal mood and affect.       ECG 12 Lead    Date/Time: 11/28/2023 2:24 PM  Performed by: Lanette Sellers APRN    Authorized by: Lanette Sellers APRN  Comparison: compared with previous ECG from 5/18/2023  Similar to previous ECG  Rhythm: atrial fibrillation  Rate: normal  BPM: 90  Conduction: conduction normal  T Waves: T waves normal  QRS axis: normal  Other findings: non-specific ST-T wave changes    Clinical impression: abnormal EKG            Assessment:       Diagnosis Plan   1. Permanent atrial fibrillation        2. Coronary artery disease involving native coronary artery of native heart without angina pectoris        3. Essential hypertension        4. Mixed hyperlipidemia        5. Obstructive sleep apnea syndrome        6. Morbid obesity with body mass index (BMI) of 40.0 or higher               Plan:       1.  Permanent Atrial Fibrillation: Mostly rate controlled on diltiazem.  With exertion her heart rate increases and she experiences some shortness of breath.  I think that is multifactorial.  Continue diltiazem.  She has a [id number] score of 4 and remains on rivaroxaban.  She plans to restart the magnesium supplement.    2.  Nonobstructive coronary artery disease noted per heart catheterization in 2014.  Denies angina.  She is not on aspirin because of the rivaroxaban.  Continue rosuvastatin.    3.  Hypertension: Blood pressure typically well-controlled.    4.  Hyperlipidemia: Remains on rosuvastatin.    5.  Obstructive Sleep Apnea: Compliant with CPAP.    6.  Obesity: Body mass index is 43.26 kg/m².  She is wanting to start a regular exercise program.  She has knee issues so I recommended a stationary bicycle  or pool exercises.    7.  I recommended a 1 year follow-up visit with Dr. Kelly Hodges.  Ms. Xu prefers every 6 months and appointment will be made.    As always, it has been a pleasure to participate in your patient's care. Thank you.         Sincerely,         MIMI Pineda  [facility] Cardiology      Dictated utilizing Dragon Dictation

## 2023-11-29 ENCOUNTER — TRANSCRIBE ORDERS (OUTPATIENT)
Dept: ADMINISTRATIVE | Facility: HOSPITAL | Age: 73
End: 2023-11-29
Payer: MEDICARE

## 2023-11-29 ENCOUNTER — LAB (OUTPATIENT)
Dept: LAB | Facility: HOSPITAL | Age: 73
End: 2023-11-29
Payer: MEDICARE

## 2023-11-29 DIAGNOSIS — R68.89 OTHER GENERAL SYMPTOMS AND SIGNS: Primary | ICD-10-CM

## 2023-11-29 DIAGNOSIS — R68.89 OTHER GENERAL SYMPTOMS AND SIGNS: ICD-10-CM

## 2023-11-29 DIAGNOSIS — E89.0 POSTSURGICAL HYPOTHYROIDISM: ICD-10-CM

## 2023-11-29 DIAGNOSIS — I10 ESSENTIAL HYPERTENSION, MALIGNANT: ICD-10-CM

## 2023-11-29 DIAGNOSIS — E78.2 MIXED HYPERLIPIDEMIA: ICD-10-CM

## 2023-11-29 DIAGNOSIS — R73.01 IMPAIRED FASTING GLUCOSE: ICD-10-CM

## 2023-11-29 LAB
ALBUMIN SERPL-MCNC: 4.7 G/DL (ref 3.5–5.2)
ALBUMIN/GLOB SERPL: 2.1 G/DL
ALP SERPL-CCNC: 70 U/L (ref 39–117)
ALT SERPL W P-5'-P-CCNC: 17 U/L (ref 1–33)
ANION GAP SERPL CALCULATED.3IONS-SCNC: 10.3 MMOL/L (ref 5–15)
AST SERPL-CCNC: 16 U/L (ref 1–32)
BILIRUB SERPL-MCNC: 1 MG/DL (ref 0–1.2)
BUN SERPL-MCNC: 16 MG/DL (ref 8–23)
BUN/CREAT SERPL: 18.4 (ref 7–25)
CALCIUM SPEC-SCNC: 9.6 MG/DL (ref 8.6–10.5)
CHLORIDE SERPL-SCNC: 107 MMOL/L (ref 98–107)
CO2 SERPL-SCNC: 26.7 MMOL/L (ref 22–29)
CREAT SERPL-MCNC: 0.87 MG/DL (ref 0.57–1)
EGFRCR SERPLBLD CKD-EPI 2021: 70 ML/MIN/1.73
GLOBULIN UR ELPH-MCNC: 2.2 GM/DL
GLUCOSE SERPL-MCNC: 100 MG/DL (ref 65–99)
HBA1C MFR BLD: 5.9 % (ref 4.8–5.6)
POTASSIUM SERPL-SCNC: 4.3 MMOL/L (ref 3.5–5.2)
PROT SERPL-MCNC: 6.9 G/DL (ref 6–8.5)
SODIUM SERPL-SCNC: 144 MMOL/L (ref 136–145)
T3 SERPL-MCNC: 81.5 NG/DL (ref 80–200)
T4 SERPL-MCNC: 9.98 MCG/DL (ref 4.5–11.7)
TSH SERPL DL<=0.05 MIU/L-ACNC: 15.8 UIU/ML (ref 0.27–4.2)

## 2023-11-29 PROCEDURE — 84436 ASSAY OF TOTAL THYROXINE: CPT

## 2023-11-29 PROCEDURE — 36415 COLL VENOUS BLD VENIPUNCTURE: CPT

## 2023-11-29 PROCEDURE — 80053 COMPREHEN METABOLIC PANEL: CPT

## 2023-11-29 PROCEDURE — 83036 HEMOGLOBIN GLYCOSYLATED A1C: CPT

## 2023-11-29 PROCEDURE — 84480 ASSAY TRIIODOTHYRONINE (T3): CPT

## 2023-11-29 PROCEDURE — 84443 ASSAY THYROID STIM HORMONE: CPT

## 2023-12-12 PROBLEM — Z12.11 COLON CANCER SCREENING: Status: ACTIVE | Noted: 2023-02-02

## 2024-01-22 NOTE — PROGRESS NOTES
The ABCs of the Annual Wellness Visit  Subsequent Medicare Wellness Visit    Subjective    Liza Mcnair is a 73 y.o. female who presents for a Subsequent Medicare Wellness Visit.    The following portions of the patient's history were reviewed and   updated as appropriate: allergies, current medications, past family history, past medical history, past social history, past surgical history, and problem list.    Compared to one year ago, the patient feels her physical   health is worse.    Compared to one year ago, the patient feels her mental   health is the same.    Recent Hospitalizations:  She was not admitted to the hospital during the last year.       Current Medical Providers:  Patient Care Team:  Peggy Carias APRN as PCP - General (Family Medicine)  Kelly oHdges MD as Cardiologist (Cardiology)  Elsa Bloom MD as Consulting Physician (Endocrinology)  Neftaly Cooley Jr., MD as Consulting Physician (Pulmonary Disease)  Tamiko De Leon MD as Consulting Physician (Ophthalmology)  Rossana Manzano MD as Consulting Physician (Dermatology)  Diana Cooper MD as Consulting Physician (Gynecology)    Outpatient Medications Prior to Visit   Medication Sig Dispense Refill    Cholecalciferol (VITAMIN D3 PO) Take 200 mg by mouth 2 (Two) Times a Day.      coenzyme Q10 100 MG capsule Take 2 capsules by mouth Every Night.      dilTIAZem CD (CARDIZEM CD) 300 MG 24 hr capsule Take 1 capsule by mouth Daily. 90 capsule 3    levothyroxine (SYNTHROID, LEVOTHROID) 150 MCG tablet Daily. 7 DAYS A WEEK ONLY TAKE 150 MCG SYNTHROID. Additional half on Sunday      MAGNESIUM PO Take 200 capsules by mouth.      olmesartan (BENICAR) 40 MG tablet Take 1 tablet by mouth Daily. 90 tablet 3    rivaroxaban (XARELTO) 20 MG tablet Take 1 tablet by mouth Daily With Dinner. 90 tablet 3    rosuvastatin (CRESTOR) 20 MG tablet Take 1 tablet by mouth Daily. 90 tablet 3    vitamin B-12 (CYANOCOBALAMIN) 500 MCG tablet  "Take 1 tablet by mouth Daily.       No facility-administered medications prior to visit.       No opioid medication identified on active medication list. I have reviewed chart for other potential  high risk medication/s and harmful drug interactions in the elderly.        Aspirin is not on active medication list.   Not taking ASA as she is on Xarelto .    Patient Active Problem List   Diagnosis    Degeneration of intervertebral disc    Hyperlipidemia    Hypothyroidism    Obstructive sleep apnea syndrome    Osteoarthritis of knee    Paroxysmal atrial fibrillation    Vitamin D deficiency    Prediabetes    Essential hypertension    Morbid obesity with body mass index (BMI) of 40.0 or higher    Other fatigue    Coronary artery disease involving native coronary artery of native heart without angina pectoris    Colon cancer screening     Advance Care Planning   Advance Care Planning     Advance Directive is not on file.  ACP discussion was held with the patient during this visit. Patient does not have an advance directive, information provided.     Objective    Vitals:    01/25/24 1359   BP: 128/78   Pulse: 88   Resp: 16   Temp: 97.8 °F (36.6 °C)   Weight: 108 kg (238 lb)   Height: 157.5 cm (62.01\")     Estimated body mass index is 43.52 kg/m² as calculated from the following:    Height as of this encounter: 157.5 cm (62.01\").    Weight as of this encounter: 108 kg (238 lb).    Class 3 Severe Obesity (BMI >=40). Obesity-related health conditions include the following: obstructive sleep apnea, hypertension, coronary heart disease, and GERD. Obesity is unchanged. BMI is is above average; BMI management plan is completed. We discussed portion control and increasing exercise.      Does the patient have evidence of cognitive impairment? No    Lab Results   Component Value Date    HGBA1C 5.90 (H) 11/29/2023        HEALTH RISK ASSESSMENT    Smoking Status:  Social History     Tobacco Use   Smoking Status Never    Passive " exposure: Never   Smokeless Tobacco Never     Alcohol Consumption:  Social History     Substance and Sexual Activity   Alcohol Use Not Currently    Comment: Caffeine use: 2 cups daily     Fall Risk Screen:    SOMMERADI Fall Risk Assessment was completed, and patient is at LOW risk for falls.Assessment completed on:2024    Depression Screenin/25/2024     2:03 PM   PHQ-2/PHQ-9 Depression Screening   Little Interest or Pleasure in Doing Things 0-->not at all   Feeling Down, Depressed or Hopeless 0-->not at all   PHQ-9: Brief Depression Severity Measure Score 0       Health Habits and Functional and Cognitive Screenin/25/2024     2:02 PM   Functional & Cognitive Status   Do you have difficulty preparing food and eating? No   Do you have difficulty bathing yourself, getting dressed or grooming yourself? No   Do you have difficulty using the toilet? No   Do you have difficulty moving around from place to place? No   Do you have trouble with steps or getting out of a bed or a chair? No   Current Diet Well Balanced Diet   Dental Exam Up to date   Eye Exam Up to date   Exercise (times per week) 0 times per week   Current Exercises Include No Regular Exercise   Do you need help using the phone?  No   Are you deaf or do you have serious difficulty hearing?  No   Do you need help to go to places out of walking distance? No   Do you need help shopping? No   Do you need help preparing meals?  No   Do you need help with housework?  No   Do you need help with laundry? No   Do you need help taking your medications? No   Do you need help managing money? No   Do you ever drive or ride in a car without wearing a seat belt? No   Have you felt unusual stress, anger or loneliness in the last month? No   Who do you live with? Alone   If you need help, do you have trouble finding someone available to you? No   Have you been bothered in the last four weeks by sexual problems? No   Do you have difficulty concentrating,  remembering or making decisions? No       Age-appropriate Screening Schedule:  Refer to the list below for future screening recommendations based on patient's age, sex and/or medical conditions. Orders for these recommended tests are listed in the plan section. The patient has been provided with a written plan.    Health Maintenance   Topic Date Due    COVID-19 Vaccine (4 - 2023-24 season) 09/01/2023    ANNUAL WELLNESS VISIT  01/19/2024    DXA SCAN  02/10/2024    LIPID PANEL  07/12/2024    BMI FOLLOWUP  01/25/2025    MAMMOGRAM  02/08/2025    COLORECTAL CANCER SCREENING  05/03/2026    TDAP/TD VACCINES (3 - Td or Tdap) 06/20/2033    HEPATITIS C SCREENING  Completed    INFLUENZA VACCINE  Completed    Pneumococcal Vaccine 65+  Completed    ZOSTER VACCINE  Completed                  CMS Preventative Services Quick Reference  Risk Factors Identified During Encounter  Weight loss strongly encouraged.   The above risks/problems have been discussed with the patient.  Pertinent information has been shared with the patient in the After Visit Summary.  An After Visit Summary and PPPS were made available to the patient.    Follow Up:   Next Medicare Wellness visit to be scheduled in 1 year.       Additional E&M Note during same encounter follows:  Patient has multiple medical problems which are significant and separately identifiable that require additional work above and beyond the Medicare Wellness Visit.      Chief Complaint  Medicare Wellness-subsequent    Subjective        HPI  Liza Mcnair is also being seen today for HTN and obesity. Denies chest pian. Dyspnea continues unchanged; this has been ongoing since diagnosis with a-fib and is followed by Dr. Hodges and Lanette Busby. HBP readings 120s/60-70s. DXA and MMG are scheduled in February. Scheduling with new GYN (Dr. Shields) since Dr. Dr. Cooper retired.     Notes stiffness and pulling in her right posterior neck over month over the last month since lifting heavy  "grocery bags. Also reports numbness in her right upper arm for 3 months which preceded neck pain. No weakness. No numbness or tingling in hands or fingers.     Recent visit with Dr. Bloom in December. Continues to adjust her Levothyroxine.    She is considering seeing an orthopedist again regarding OA in her knees. She is nervous about doing so and really does not wish to have surgery. She takes Tylenol for it as she is anticoagulated with Xarelto.         Objective   Vital Signs:  /78   Pulse 88   Temp 97.8 °F (36.6 °C)   Resp 16   Ht 157.5 cm (62.01\")   Wt 108 kg (238 lb)   BMI 43.52 kg/m²     Physical Exam  Vitals reviewed.   Constitutional:       Appearance: Normal appearance. She is well-developed. She is obese.   HENT:      Head: Normocephalic and atraumatic.      Right Ear: Tympanic membrane, ear canal and external ear normal.      Left Ear: Tympanic membrane, ear canal and external ear normal.      Nose: Nose normal.      Mouth/Throat:      Mouth: Mucous membranes are moist.      Pharynx: Oropharynx is clear. No oropharyngeal exudate or posterior oropharyngeal erythema.   Eyes:      General: No scleral icterus.     Extraocular Movements: Extraocular movements intact.      Conjunctiva/sclera: Conjunctivae normal.      Pupils: Pupils are equal, round, and reactive to light.   Neck:      Thyroid: No thyromegaly.      Vascular: No carotid bruit.      Comments: ROM c-spine with decreased lateral bends and pain with lateral bends and rotation to the right.  Cardiovascular:      Rate and Rhythm: Normal rate and regular rhythm.      Heart sounds: Normal heart sounds. No murmur heard.  Pulmonary:      Effort: Pulmonary effort is normal.      Breath sounds: Normal breath sounds.   Abdominal:      General: Bowel sounds are normal. There is no distension.      Palpations: Abdomen is soft.      Tenderness: There is no abdominal tenderness.   Musculoskeletal:      Cervical back: Neck supple.      Comments: " Ambulates with walker; no clubbing, cyanosis or edema.    Lymphadenopathy:      Cervical: No cervical adenopathy.   Skin:     General: Skin is warm and dry.   Neurological:      Mental Status: She is alert.      Motor: No weakness.      Comments: CN XI intact.    Psychiatric:         Mood and Affect: Mood normal.         Behavior: Behavior normal.                         Assessment and Plan   Diagnoses and all orders for this visit:    1. Medicare annual wellness visit, subsequent (Primary)  Comments:  150 minutes weekly aerobic exercise advised; warm water aerobics would be advisable. Schedule with new GYN--Dr. Shields.    2. Essential hypertension  Comments:  Controlled. Continue current medications and RTO in 6M.    3. Neck pain  Comments:  New problem. Given neck pain with numbness in R-UA. MRI advised.  Orders:  -     MRI Cervical Spine Without Contrast    4. Right upper extremity numbness  -     MRI Cervical Spine Without Contrast    5. Morbid obesity with body mass index (BMI) of 40.0 or higher  Comments:  Weight loss via dietary changes and 150 minutes aerobic exercise advised--warm water aerobics at [facility] or [facility] adviseable.    6. Mixed hyperlipidemia  Comments:  FLP prior to follow up in 6M.  Orders:  -     Lipid Panel; Future    7. Paroxysmal atrial fibrillation  Comments:  Managed by Dr. Hodges. Continues Xarelto 20 mg daily.    8. Acquired hypothyroidism  Comments:  Managed by endocrinology Dr. Elsa Bloom.    9. Osteoarthritis of both knees, unspecified osteoarthritis type  Comments:  Sheis considering whether she wants to see ortho and will let me know.    Records reviewed include previous OV with myself, Lanette Busby and Lara Antonio as well as labs.          Follow Up   Return in about 6 months (around 7/25/2024) for 30 min Lab B4 FUP.  Patient was given instructions and counseling regarding her condition or for health maintenance advice. Please see specific information pulled into the AVS if  appropriate.

## 2024-01-25 ENCOUNTER — OFFICE VISIT (OUTPATIENT)
Dept: INTERNAL MEDICINE | Facility: CLINIC | Age: 74
End: 2024-01-25
Payer: MEDICARE

## 2024-01-25 VITALS
DIASTOLIC BLOOD PRESSURE: 78 MMHG | HEIGHT: 62 IN | TEMPERATURE: 97.8 F | BODY MASS INDEX: 43.79 KG/M2 | RESPIRATION RATE: 16 BRPM | WEIGHT: 238 LBS | HEART RATE: 88 BPM | SYSTOLIC BLOOD PRESSURE: 128 MMHG

## 2024-01-25 DIAGNOSIS — E78.2 MIXED HYPERLIPIDEMIA: Chronic | ICD-10-CM

## 2024-01-25 DIAGNOSIS — E66.01 MORBID OBESITY WITH BODY MASS INDEX (BMI) OF 40.0 OR HIGHER: Chronic | ICD-10-CM

## 2024-01-25 DIAGNOSIS — Z00.00 MEDICARE ANNUAL WELLNESS VISIT, SUBSEQUENT: Primary | ICD-10-CM

## 2024-01-25 DIAGNOSIS — E03.9 ACQUIRED HYPOTHYROIDISM: Chronic | ICD-10-CM

## 2024-01-25 DIAGNOSIS — M17.0 OSTEOARTHRITIS OF BOTH KNEES, UNSPECIFIED OSTEOARTHRITIS TYPE: Chronic | ICD-10-CM

## 2024-01-25 DIAGNOSIS — M54.2 NECK PAIN: ICD-10-CM

## 2024-01-25 DIAGNOSIS — I48.0 PAROXYSMAL ATRIAL FIBRILLATION: Chronic | ICD-10-CM

## 2024-01-25 DIAGNOSIS — I10 ESSENTIAL HYPERTENSION: Chronic | ICD-10-CM

## 2024-01-25 DIAGNOSIS — R20.0 RIGHT UPPER EXTREMITY NUMBNESS: ICD-10-CM

## 2024-01-30 ENCOUNTER — TELEPHONE (OUTPATIENT)
Dept: INTERNAL MEDICINE | Facility: CLINIC | Age: 74
End: 2024-01-30
Payer: MEDICARE

## 2024-01-30 NOTE — TELEPHONE ENCOUNTER
Caller: Liza Mcnair    Relationship: Self    Best call back number: [phone]    What orders are you requesting (i.e. lab or imaging): WOULD LIKE A CALL WITH STATUS OF MRI TO     [facility], operated by [facility]

## 2024-02-19 ENCOUNTER — HOSPITAL ENCOUNTER (OUTPATIENT)
Dept: MRI IMAGING | Facility: HOSPITAL | Age: 74
Discharge: HOME | End: 2024-02-19
Admitting: NURSE PRACTITIONER
Payer: MEDICARE

## 2024-02-19 ENCOUNTER — PATIENT MESSAGE (OUTPATIENT)
Dept: INTERNAL MEDICINE | Facility: CLINIC | Age: 74
End: 2024-02-19
Payer: MEDICARE

## 2024-02-19 PROCEDURE — 72141 MRI NECK SPINE W/O DYE: CPT

## 2024-02-22 ENCOUNTER — TELEPHONE (OUTPATIENT)
Dept: INTERNAL MEDICINE | Facility: CLINIC | Age: 74
End: 2024-02-22
Payer: MEDICARE

## 2024-02-22 DIAGNOSIS — R20.0 RIGHT UPPER EXTREMITY NUMBNESS: ICD-10-CM

## 2024-02-22 DIAGNOSIS — M54.2 NECK PAIN: Primary | ICD-10-CM

## 2024-02-22 NOTE — TELEPHONE ENCOUNTER
Name: Liza Mcnair      Relationship: Self      Best Callback Number: [phone]      HUB PROVIDED THE RELAY MESSAGE FROM THE OFFICE      PATIENT: VOICED UNDERSTANDING AND HAS NO FURTHER QUESTIONS AT THIS TIME    ADDITIONAL INFORMATION:

## 2024-02-22 NOTE — TELEPHONE ENCOUNTER
"Relay     \"MRI of her neck shows lots of arthritic and degenerative changes. There is an area C4-C5  with edema and flattening of the cord that may be contributing to the numbness and tingling she describes. I would like her to see one of our neurosurgeons, Dr. Salazar. \"                "

## 2024-02-26 ENCOUNTER — TRANSCRIBE ORDERS (OUTPATIENT)
Dept: ADMINISTRATIVE | Facility: HOSPITAL | Age: 74
End: 2024-02-26
Payer: MEDICARE

## 2024-02-26 ENCOUNTER — LAB (OUTPATIENT)
Dept: LAB | Facility: HOSPITAL | Age: 74
End: 2024-02-26
Payer: MEDICARE

## 2024-02-26 DIAGNOSIS — R73.01 IMPAIRED FASTING GLUCOSE: ICD-10-CM

## 2024-02-26 DIAGNOSIS — I10 ESSENTIAL HYPERTENSION, BENIGN: ICD-10-CM

## 2024-02-26 DIAGNOSIS — R68.89 OTHER GENERAL SYMPTOMS AND SIGNS: ICD-10-CM

## 2024-02-26 DIAGNOSIS — E78.2 MIXED HYPERLIPIDEMIA: Primary | ICD-10-CM

## 2024-02-26 DIAGNOSIS — E89.0 POSTSURGICAL HYPOTHYROIDISM: ICD-10-CM

## 2024-02-26 DIAGNOSIS — E78.2 MIXED HYPERLIPIDEMIA: ICD-10-CM

## 2024-02-26 LAB
ALBUMIN SERPL-MCNC: 4 G/DL (ref 3.5–5.2)
ALBUMIN/GLOB SERPL: 1.4 G/DL
ALP SERPL-CCNC: 67 U/L (ref 39–117)
ALT SERPL W P-5'-P-CCNC: 15 U/L (ref 1–33)
ANION GAP SERPL CALCULATED.3IONS-SCNC: 13.8 MMOL/L (ref 5–15)
AST SERPL-CCNC: 17 U/L (ref 1–32)
BILIRUB SERPL-MCNC: 0.7 MG/DL (ref 0–1.2)
BUN SERPL-MCNC: 17 MG/DL (ref 8–23)
BUN/CREAT SERPL: 22.1 (ref 7–25)
CALCIUM SPEC-SCNC: 9.3 MG/DL (ref 8.6–10.5)
CHLORIDE SERPL-SCNC: 105 MMOL/L (ref 98–107)
CO2 SERPL-SCNC: 23.2 MMOL/L (ref 22–29)
CREAT SERPL-MCNC: 0.77 MG/DL (ref 0.57–1)
EGFRCR SERPLBLD CKD-EPI 2021: 81.1 ML/MIN/1.73
GLOBULIN UR ELPH-MCNC: 2.9 GM/DL
GLUCOSE SERPL-MCNC: 93 MG/DL (ref 65–99)
HBA1C MFR BLD: 5.7 % (ref 4.8–5.6)
POTASSIUM SERPL-SCNC: 4.5 MMOL/L (ref 3.5–5.2)
PROT SERPL-MCNC: 6.9 G/DL (ref 6–8.5)
SODIUM SERPL-SCNC: 142 MMOL/L (ref 136–145)
T3 SERPL-MCNC: 102 NG/DL (ref 80–200)
T4 SERPL-MCNC: 10.4 MCG/DL (ref 4.5–11.7)
TSH SERPL DL<=0.05 MIU/L-ACNC: 3.64 UIU/ML (ref 0.27–4.2)

## 2024-02-26 PROCEDURE — 83036 HEMOGLOBIN GLYCOSYLATED A1C: CPT

## 2024-02-26 PROCEDURE — 84436 ASSAY OF TOTAL THYROXINE: CPT

## 2024-02-26 PROCEDURE — 84443 ASSAY THYROID STIM HORMONE: CPT

## 2024-02-26 PROCEDURE — 80053 COMPREHEN METABOLIC PANEL: CPT

## 2024-02-26 PROCEDURE — 36415 COLL VENOUS BLD VENIPUNCTURE: CPT

## 2024-02-26 PROCEDURE — 84480 ASSAY TRIIODOTHYRONINE (T3): CPT

## 2024-03-06 ENCOUNTER — TELEPHONE (OUTPATIENT)
Dept: NEUROSURGERY | Facility: CLINIC | Age: 74
End: 2024-03-06
Payer: MEDICARE

## 2024-03-06 NOTE — TELEPHONE ENCOUNTER
I WAS ASKED TO RESCHEDULE PT DUE TO DR. PAYNE HAS AN EMERGENCY.  PT WAS RESCHEDULED FROM 03/15/24 AT 2:45 P.M. TO 03/29/24 AT 2:45 P.M.  PT STATES SHE HAS TO RELY ON FAMILY MEMBER TO DRIVE HER TO HER APPOINTMENTS. SHE REQUESTED A LATE FRIDAY APPOINTMENT, DUE TO HER  IS IN THE WORK FORCE AND HAS TO TAKE OFF FROM THEIR JOB.

## 2024-03-27 NOTE — PROGRESS NOTES
"Subjective   History of Present Illness: Liza Mcnair is a 73 y.o. female is being seen for consultation today at the request of Peggy Carais,*for evaluation of a pulling sensation she gets intermittently in the right side of the neck to the right shoulder.  She denies any weakness in the right arm but she does have a numb sensation at times over the right shoulder but not down the arm.  She denies any distal arm symptoms such as weakness pain or numbness.  She has chronic knee problems and does use the assistance of a walker for long distance walking because of her knees.  She denies any specific injury but states this has been a progressive issue that seems to be more persistent over the last several months.  For a variety of reasons she sleeps in a recliner and is concerned that the sleeping position in a recliner may be contributing bitting to some of her symptoms.  She denies Lhermitte sign when coughing or sneezing.    History of Present Illness    Tobacco Use: Low Risk  (3/29/2024)    Patient History     Smoking Tobacco Use: Never     Smokeless Tobacco Use: Never     Passive Exposure: Never        The following portions of the patient's history were reviewed and updated as appropriate: allergies, current medications, past family history, past medical history, past social history, past surgical history and problem list.    Review of Systems   Constitutional:  Negative for fever.   Musculoskeletal:  Positive for neck pain and neck stiffness.   Neurological:  Positive for numbness. Negative for dizziness, weakness, light-headedness and headaches.       Objective     Vitals:    03/29/24 1442   BP: 154/76   BP Location: Left arm   Patient Position: Sitting   Cuff Size: Adult   Resp: 20   Weight: 108 kg (238 lb)   Height: 157.5 cm (62.01\")   PainSc:   3   PainLoc: Neck     Body mass index is 43.52 kg/m².      Physical Exam  Neurologic Exam    Physical Exam:    CONSTITUTIONAL: This 73 year old  " female appears well developed, well-nourished and in no acute distress.    HEAD & FACE: the head and face are symmetric, normocephalic and atraumatic.    EYES: Inspection of the conjunctivae and lids reveals no swelling, erythema or discharge.  Pupils are round, equal and reactive to light and there is no scleral icterus.    EARS, NOSE, MOUTH & THROAT: On inspection, the ears and nose are within normal limits.    NECK: the neck is supple and symmetric. The trachea is midline with no masses.  She has good range of motion but with flexion right lateral bending and rotation to the right she feels a pulling sensation from the neck to the right shoulder that is not overly painful but just tight sensation.  She does not have any numbness with that sensation.    PULMONARY: Respiratory effort is normal with no increased work of breathing or signs of respiratory distress.    CARDIOVASCULAR:. Examination of the extremities shows no edema or varicosities.    MUSCULOSKELETAL: Gait and station are wide-based. The spine has normal alignment and range of motion.    SKIN: The skin is warm, dry and intact    NEUROLOGIC:   Cranial Nerves 2-12 intact  Normal motor strength noted in both upper extremities testing from the deltoid, tricep, bicep, brachioradialis and handgrip strength.. Muscle bulk and tone are normal.  Sensory exam is normal to fine touch to confrontational testing bilaterally  Reflexes on the right side demonstrates 1/4 Triceps Reflex, 1/4 Biceps Reflex, 0/4 Brachioradialis Reflex, 1/4 Knee Jerk Reflex, 0/4 Ankle Jerk Reflex and no ankle clonus on the right.   Reflexes on the left side demonstrates 1/4 Triceps Reflex, 1/4 Biceps Reflex, 0/4 Brachioradialis Reflex, 1/4 Knee Jerk Reflex, 0/4 Ankle Jerk Reflex and no ankle clonus on the left.  Superficial/Primitive Reflexes: primitive reflexes were absent.  Santillan's, Babinski, and Clonus signs all negative.  No coordination deficit observed.  Radicular testing showed a  negative Spurling's maneuver  Cortical function is intact and without deficits. Speech is normal.    PSYCHIATRIC: oriented to person, place and time. Patient's mood and affect are normal.    Assessment & Plan   Independent Review of Radiographic Studies:      I personally reviewed the images from the following studies.    MRI of the cervical spine done at [facility] on February 19, 2024 reveals multilevel degenerative change throughout the cervical spine with no evidence of any focal disc herniation.  Most prominent degenerative change at C4-C5 where there is moderate to severe facet degenerative changes on the right associated with some edematous changes.  Mild to moderate overall canal stenosis with mild neuroforaminal narrowing is seen at that level bilaterally.        Medical Decision Making:      Patient has symptomatology that might correlate with a mild C5 radiculopathy in the right upper extremity intermittently.  She has cervical stenosis with right neuroforaminal narrowing greater than left at C4-C5.  Although she does have significant stenosis with contact of the cord there is no signal change in the cord and she has no clinical signs of cervical myelopathy.  Given her symptomatology and lack of objective neurologic findings have suggested that she consult with a physical therapist to attempt physical therapy techniques to control the symptoms.  80% of patients get satisfying results from the therapy approach.      If she does not gain satisfaction she may require an anterior cervical discectomy and fusion at C4-C5 with allograft bone fusion and titanium instrumentation.  She has several comorbidities and is on Xarelto for thinning the blood so she would need medical clearance prior to considering elective cervical surgery.      I will see her back upon completion of the physical therapy to see if she gets satisfying results.    Return for discussion of Physical Therapy results.    Diagnoses and all  orders for this visit:    1. Cervical radiculopathy at C5 (Primary)  -     Ambulatory Referral to Physical Therapy Evaluate and treat    2. DDD (degenerative disc disease), cervical  -     Ambulatory Referral to Physical Therapy Evaluate and treat    3. Spinal stenosis in cervical region  -     Ambulatory Referral to Physical Therapy Evaluate and treat             Ki Salazar MD FACS FAANS  Neurological Surgery

## 2024-03-29 ENCOUNTER — OFFICE VISIT (OUTPATIENT)
Dept: NEUROSURGERY | Facility: CLINIC | Age: 74
End: 2024-03-29
Payer: MEDICARE

## 2024-03-29 VITALS
SYSTOLIC BLOOD PRESSURE: 154 MMHG | DIASTOLIC BLOOD PRESSURE: 76 MMHG | WEIGHT: 238 LBS | HEIGHT: 62 IN | BODY MASS INDEX: 43.79 KG/M2 | RESPIRATION RATE: 20 BRPM

## 2024-03-29 DIAGNOSIS — M54.12 CERVICAL RADICULOPATHY AT C5: Primary | ICD-10-CM

## 2024-03-29 DIAGNOSIS — M50.30 DDD (DEGENERATIVE DISC DISEASE), CERVICAL: ICD-10-CM

## 2024-03-29 DIAGNOSIS — M48.02 SPINAL STENOSIS IN CERVICAL REGION: ICD-10-CM

## 2024-03-29 PROCEDURE — 1160F RVW MEDS BY RX/DR IN RCRD: CPT | Performed by: NEUROLOGICAL SURGERY

## 2024-03-29 PROCEDURE — 99204 OFFICE O/P NEW MOD 45 MIN: CPT | Performed by: NEUROLOGICAL SURGERY

## 2024-03-29 PROCEDURE — 1159F MED LIST DOCD IN RCRD: CPT | Performed by: NEUROLOGICAL SURGERY

## 2024-03-29 PROCEDURE — 3077F SYST BP >= 140 MM HG: CPT | Performed by: NEUROLOGICAL SURGERY

## 2024-03-29 PROCEDURE — 3078F DIAST BP <80 MM HG: CPT | Performed by: NEUROLOGICAL SURGERY

## 2024-04-29 NOTE — PROGRESS NOTES
"Subjective   History of Present Illness: Liza Mcnair is a 73 y.o. female is here today for follow-up after physical therapy that was ordered for a pulling sensation in the right sided of her neck to the right shoulder.    Today, Ms. Mcnair denies any pain. Patient states that PT is helping, she is still having the pulling sensation in her neck into her right arm and numbness in the arm intermittently in the deltoid region.  She says she is able to manage since the symptoms come and go.  She has been very happy with the PT results and continues to do therapy exercises.  She is also found relief until a number of other issues including her neck by using the warm water therapy through the local community pool.    History of Present Illness    Tobacco Use: Low Risk  (5/2/2024)    Patient History     Smoking Tobacco Use: Never     Smokeless Tobacco Use: Never     Passive Exposure: Never        The following portions of the patient's history were reviewed and updated as appropriate: allergies, current medications, past family history, past medical history, past social history, past surgical history and problem list.    Review of Systems    Objective     Vitals:    05/02/24 1318   BP: 138/88   Weight: 108 kg (238 lb)   Height: 157.5 cm (62.01\")     Body mass index is 43.52 kg/m².      Physical Exam  Neurologic Exam    Physical Exam:    CONSTITUTIONAL:  appears well developed, well-nourished and in no acute distress.    NECK: the neck is supple and symmetric. The trachea is midline with no masses.  Range of motion today without exacerbation of pain    PULMONARY: Respiratory effort is normal with no increased work of breathing or signs of respiratory distress.    CARDIOVASCULAR:  Examination of the extremities shows no edema or varicosities.    MUSCULOSKELETAL: Gait reveals she uses a walker because of chronic knee problems that she has been recommended bilateral knee replacement    SKIN: The skin is warm, dry and intact.  "     NEUROLOGIC:   Cranial Nerves 2-12 intact  Normal motor strength noted in both upper extremities testing from the deltoid, tricep, bicep, brachioradialis and handgrip strength.. Muscle bulk and tone are normal.  Sensory exam is normal to fine touch to confrontational testing bilaterally  Reflexes on the right side demonstrates 1/4 Triceps Reflex, 1/4 Biceps Reflex, 0/4 Brachioradialis Reflex, 1/4 Knee Jerk Reflex, 0/4 Ankle Jerk Reflex and no ankle clonus on the right.   Reflexes on the left side demonstrates 1/4 Triceps Reflex, 1/4 Biceps Reflex, 0/4 Brachioradialis Reflex, 1/4 Knee Jerk Reflex, 0/4 Ankle Jerk Reflex and no ankle clonus on the left.  Superficial/Primitive Reflexes: primitive reflexes were absent.  Santillan's, Babinski, and Clonus signs all negative.  No coordination deficit observed.  Radicular testing showed a negative Spurling's maneuver  Cortical function is intact and without deficits. Speech is normal.    PSYCHIATRIC: oriented to person, place and time. Patient's mood and affect are normal.    Assessment & Plan   Independent Review of Radiographic Studies:      I personally reviewed the images from the following studies.    MRI of the cervical spine done at [facility] on February 19, 2024 reveals multilevel degenerative change throughout the cervical spine with no evidence of any focal disc herniation.  Most prominent degenerative change at C4-C5 where there is moderate to severe facet degenerative changes on the right associated with some edematous changes.  Mild to moderate overall canal stenosis with mild neuroforaminal narrowing is seen at that level bilaterally.            Medical Decision Making:      She still appears to have some C5 radicular symptoms in the right upper extremity but she says they are much more manageable with her medical regimen as well as physical therapy.  We discussed that because she still has symptoms surgery can be contemplated but certainly not without  risk and she is on Xarelto increasing the risk relating to her heart.  If she chose to proceed with surgery cardiac clearance but at this time she feels like she is able to manage.  I think we should follow her clinically with repeat neurologic exam in 3 months to see how she is doing as she is discharged from the physical therapist supervision.    Return in about 3 months (around 8/2/2024) for recheck.    Diagnoses and all orders for this visit:    1. Cervical radiculopathy at C5 (Primary)    2. DDD (degenerative disc disease), cervical    3. Spinal stenosis in cervical region             Ki Salazar MD FACS FAANS  Neurological Surgery

## 2024-05-02 ENCOUNTER — OFFICE VISIT (OUTPATIENT)
Dept: NEUROSURGERY | Facility: CLINIC | Age: 74
End: 2024-05-02
Payer: MEDICARE

## 2024-05-02 ENCOUNTER — TELEPHONE (OUTPATIENT)
Dept: NEUROSURGERY | Facility: CLINIC | Age: 74
End: 2024-05-02

## 2024-05-02 VITALS
HEIGHT: 62 IN | DIASTOLIC BLOOD PRESSURE: 88 MMHG | SYSTOLIC BLOOD PRESSURE: 138 MMHG | WEIGHT: 238 LBS | BODY MASS INDEX: 43.79 KG/M2

## 2024-05-02 DIAGNOSIS — M50.30 DDD (DEGENERATIVE DISC DISEASE), CERVICAL: ICD-10-CM

## 2024-05-02 DIAGNOSIS — M54.12 CERVICAL RADICULOPATHY AT C5: Primary | ICD-10-CM

## 2024-05-02 DIAGNOSIS — M48.02 SPINAL STENOSIS IN CERVICAL REGION: ICD-10-CM

## 2024-05-02 PROCEDURE — 99213 OFFICE O/P EST LOW 20 MIN: CPT | Performed by: NEUROLOGICAL SURGERY

## 2024-05-02 PROCEDURE — 1160F RVW MEDS BY RX/DR IN RCRD: CPT | Performed by: NEUROLOGICAL SURGERY

## 2024-05-02 PROCEDURE — 1159F MED LIST DOCD IN RCRD: CPT | Performed by: NEUROLOGICAL SURGERY

## 2024-05-02 PROCEDURE — 3075F SYST BP GE 130 - 139MM HG: CPT | Performed by: NEUROLOGICAL SURGERY

## 2024-05-02 PROCEDURE — 3079F DIAST BP 80-89 MM HG: CPT | Performed by: NEUROLOGICAL SURGERY

## 2024-05-02 NOTE — TELEPHONE ENCOUNTER
PT CALLED BACK: STATES D/T HER AFIB SHE CANNOT COME IN EARLIER (WANTS TO GIVE HER MEDS TIME TO KICK IN) AND STATED IF HER APPT NEEDS TO BE RESCHEDULED SHE IS OKAY WITH THAT. TRIED TO WARM TRANSFER.      PT CONTACT: [phone]    BEST TIME TO CALL: ANYTIME

## 2024-05-13 DIAGNOSIS — I10 PRIMARY HYPERTENSION: ICD-10-CM

## 2024-05-13 RX ORDER — OLMESARTAN MEDOXOMIL 40 MG/1
40 TABLET ORAL DAILY
Qty: 90 TABLET | Refills: 3 | Status: SHIPPED | OUTPATIENT
Start: 2024-05-13

## 2024-05-23 DIAGNOSIS — E78.2 MIXED HYPERLIPIDEMIA: ICD-10-CM

## 2024-05-23 RX ORDER — ROSUVASTATIN CALCIUM 20 MG/1
20 TABLET, COATED ORAL DAILY
Qty: 90 TABLET | Refills: 0 | Status: SHIPPED | OUTPATIENT
Start: 2024-05-23

## 2024-06-05 ENCOUNTER — OFFICE VISIT (OUTPATIENT)
Dept: CARDIOLOGY | Facility: CLINIC | Age: 74
End: 2024-06-05
Payer: MEDICARE

## 2024-06-05 VITALS
WEIGHT: 240 LBS | SYSTOLIC BLOOD PRESSURE: 120 MMHG | DIASTOLIC BLOOD PRESSURE: 78 MMHG | BODY MASS INDEX: 44.16 KG/M2 | HEIGHT: 62 IN | HEART RATE: 103 BPM

## 2024-06-05 DIAGNOSIS — I25.10 CORONARY ARTERY DISEASE INVOLVING NATIVE CORONARY ARTERY OF NATIVE HEART WITHOUT ANGINA PECTORIS: Primary | ICD-10-CM

## 2024-06-05 DIAGNOSIS — E78.2 MIXED HYPERLIPIDEMIA: ICD-10-CM

## 2024-06-05 DIAGNOSIS — I10 ESSENTIAL HYPERTENSION: Chronic | ICD-10-CM

## 2024-06-05 DIAGNOSIS — I48.0 PAROXYSMAL ATRIAL FIBRILLATION: Chronic | ICD-10-CM

## 2024-06-05 PROCEDURE — 93000 ELECTROCARDIOGRAM COMPLETE: CPT | Performed by: INTERNAL MEDICINE

## 2024-06-05 PROCEDURE — 3078F DIAST BP <80 MM HG: CPT | Performed by: INTERNAL MEDICINE

## 2024-06-05 PROCEDURE — 99214 OFFICE O/P EST MOD 30 MIN: CPT | Performed by: INTERNAL MEDICINE

## 2024-06-05 PROCEDURE — 1160F RVW MEDS BY RX/DR IN RCRD: CPT | Performed by: INTERNAL MEDICINE

## 2024-06-05 PROCEDURE — 3074F SYST BP LT 130 MM HG: CPT | Performed by: INTERNAL MEDICINE

## 2024-06-05 PROCEDURE — 1159F MED LIST DOCD IN RCRD: CPT | Performed by: INTERNAL MEDICINE

## 2024-06-05 RX ORDER — METOPROLOL SUCCINATE 25 MG/1
25 TABLET, EXTENDED RELEASE ORAL NIGHTLY
Qty: 90 TABLET | Refills: 3 | Status: SHIPPED | OUTPATIENT
Start: 2024-06-05

## 2024-06-05 NOTE — PROGRESS NOTES
Date of Office Visit: 2024  Encounter Provider: Kelly Hodges MD  Place of Service: [facility]  Patient Name: Liza Mcnair  :1950      Patient ID:  Liza Mcnair is a 73 y.o. female is here for  followup for atrial fibrillation        History of Present Illness    She has a history of mild nonobstructive coronary artery disease by cardiac catheterization done 2014, LORI on CPAP, atrial fibrillation, hypothyroidism, osteoarthritis, asthma, hypertension and hyperlipidemia.  She sees Dr. Cooley for asthma.      She does not smoke or use alcohol.      She had a stress study on 2019 which showed a very small area of reversibility in the inferoapical segment, possibly very slight ischemia, ejection fraction 61%.  Showed normal 4-day monitor done 2020.  She wore a Zio patch monitor for 4 days done 2020.  This was a normal monitor.       She was diagnosed with atrial fibrillation 2022 and started on Xarelto. She had some hematuria and saw Dr. Indu Garcia, oncology.  She sent her to see Dr. Ortiz from urology did MRI of the abdomen and pelvis, CT abdomen pelvis and cystoscopy,  no cancer was found, no infections were found.  He recommended she come back in a year. She had a nonischemic stress PET study done 2022.  Echocardiogram done 2022 showed ejection fraction of 63% with normal diastolic function, no significant valvular abnormalities.       Labs on 2024 show normal CMP, hemoglobin A1c 5.7%, normal thyroid panel.  Lipids on 2023 showed total cholesterol 154, HDL 75, LDL 67, triglycerides 59.  She has exertional dyspnea.  She does have some chest tightness when she lays back in bed and does have dyspnea with that as well.  She does not feel her heart racing or skipping.  She cannot tolerate adhesive for monitors.  She takes her medications as directed without difficulty.  She has no dizziness, syncope or falls.  She  has right arm pain and was told by neurosurgery that she has a right nerve impingement causing this-due to cervical spine disease    Past Medical History:   Diagnosis Date    Abnormal stress test     Stress echo on 8/21/14 showed hypokinesis of the mid anteroseptum and distal anterior wall.    Allergy to IVP dye     Severe itching    Atherosclerosis     Atherosclerosis of abdominal aorta    CAD (coronary artery disease) 09/2014    Mild, nonobstructive    Gastric ulcer     GERD (gastroesophageal reflux disease)     Hemangioma     Hemangioma of liver (right lobe)    Hyperlipidemia     Hypothyroidism     History of Hashimoto's and goiter - s/p total thyroidectomy in 11/12    Migraine     Mitral valve insufficiency     Moderate to severe by RAUL on 9/4/14.  Mild to moderate by echo on 10/28/15.    Mitral valve prolapse     Non-rheumatic tricuspid valve insufficiency     Obstructive sleep apnea syndrome     Osteopenia          Past Surgical History:   Procedure Laterality Date    CHOLECYSTECTOMY  1992    EYE SURGERY      cataract surgery    THYROID SURGERY  06/07/2012    UTERINE FIBROID SURGERY      Several uterine ablation surgeries        Current Outpatient Medications on File Prior to Visit   Medication Sig Dispense Refill    Cholecalciferol (VITAMIN D3 PO) Take 200 mg by mouth 2 (Two) Times a Day.      coenzyme Q10 100 MG capsule Take 2 capsules by mouth Every Night.      dilTIAZem CD (CARDIZEM CD) 300 MG 24 hr capsule Take 1 capsule by mouth Daily. 90 capsule 3    levothyroxine (SYNTHROID, LEVOTHROID) 150 MCG tablet Daily. 7 DAYS A WEEK ONLY TAKE 150 MCG SYNTHROID. Additional half on Sunday      MAGNESIUM PO Take 200 mg by mouth.      olmesartan (BENICAR) 40 MG tablet TAKE 1 TABLET DAILY 90 tablet 3    rivaroxaban (XARELTO) 20 MG tablet Take 1 tablet by mouth Daily With Dinner. 90 tablet 3    rosuvastatin (CRESTOR) 20 MG tablet TAKE 1 TABLET DAILY 90 tablet 0    vitamin B-12 (CYANOCOBALAMIN) 500 MCG tablet Take 1  "tablet by mouth Daily.       No current facility-administered medications on file prior to visit.       Social History     Socioeconomic History    Marital status:    Tobacco Use    Smoking status: Never     Passive exposure: Never    Smokeless tobacco: Never   Vaping Use    Vaping status: Never Used   Substance and Sexual Activity    Alcohol use: Not Currently     Comment: Caffeine use: 2 cups daily    Drug use: No    Sexual activity: Not Currently     Partners: Male             Procedures    ECG 12 Lead    Date/Time: 6/5/2024 1:33 PM  Performed by: Kelly Hodges MD    Authorized by: Kelly Hodges MD  Comparison: compared with previous ECG   Similar to previous ECG    Clinical impression: abnormal EKG              Objective:      Vitals:    06/05/24 1320   BP: 120/78   Pulse: 103   Weight: 109 kg (240 lb)   Height: 157.5 cm (62\")     Body mass index is 43.9 kg/m².    Vitals reviewed.   Constitutional:       General: Not in acute distress.     Appearance: Not diaphoretic.   Neck:      Vascular: No carotid bruit or JVD.   Pulmonary:      Effort: Pulmonary effort is normal.      Breath sounds: Normal breath sounds.   Cardiovascular:      Tachycardia present. Irregularly irregular rhythm.      Murmurs: There is no murmur.      No gallop.  No rub.   Pulses:     Intact distal pulses.      Carotid: 2+ bilaterally.     Radial: 2+ bilaterally.     Dorsalis pedis: 2+ bilaterally.     Posterior tibial: 2+ bilaterally.  Edema:     Peripheral edema absent.   Neurological:      Cranial Nerves: No cranial nerve deficit.         Lab Review:       Assessment:      Diagnosis Plan   1. Coronary artery disease involving native coronary artery of native heart without angina pectoris        2. Paroxysmal atrial fibrillation        3. Essential hypertension        4. Mixed hyperlipidemia          Hypertension, BP goal <120/80.  Well-controlled.  Hyperlipidemia, on rosuvastatin.   Mild nonobstructive CAD, no angina. "   Persistent atrial fibrillation, on rivaroxaban. Her [id number] is 3 giving her 3.2% annual risk of stroke.  Her heart rate is high and she has dyspnea.  Start metoprolol succinate 25 mg nightly.  She cannot take digoxin due to a yellow dye allergy.  She cannot tolerate monitors due to adhesive allergy.  LORI is now on CPAP.   Hypothyroidism to removal of her thyroid, stable on replacement.  Hematuria, follows with urology.  Also sees Peggy Krueger  Knee pain, may need knee replacement with orthopedics     Plan:       See APRN in 6 weeks to recheck heart rate.  I also know how her dyspnea is.  It is possible she would need a repeat echocardiogram for the dyspnea.

## 2024-06-13 ENCOUNTER — TELEPHONE (OUTPATIENT)
Dept: CARDIOLOGY | Facility: CLINIC | Age: 74
End: 2024-06-13
Payer: MEDICARE

## 2024-06-13 NOTE — TELEPHONE ENCOUNTER
Patient called and stated that since she has started taking Metoprolol she has had a HA and very fatigued.  She is wanting to know will these symptoms get any better or is this something that she has to get used to?      Called and spoke with the patient and advised that the Metoprolol does cause fatigue.  She stated that she takes it in the evening before bed.  She stated that when she gets up she can feel the HA coming on and then she gets a HA within about an hour or so of taking her meds.  She has had a HX of migraines and is concerned about just merely existing.     Please advise.    CB: [phone]

## 2024-06-19 ENCOUNTER — TELEPHONE (OUTPATIENT)
Dept: INTERNAL MEDICINE | Facility: CLINIC | Age: 74
End: 2024-06-19
Payer: MEDICARE

## 2024-06-19 RX ORDER — RIVAROXABAN 20 MG/1
20 TABLET, FILM COATED ORAL
Qty: 90 TABLET | Refills: 3 | Status: SHIPPED | OUTPATIENT
Start: 2024-06-19

## 2024-06-19 NOTE — TELEPHONE ENCOUNTER
I am glad she is negative for COVID. She can take plain Mucinex for cough and nasal drainage. Tylenol for fever. If not better or symptoms worsen she will need to be seen.

## 2024-06-19 NOTE — TELEPHONE ENCOUNTER
Caller: Liza Mcnair    Relationship: Self    Best call back number: [phone]     What is the best time to reach you: ANY    Who are you requesting to speak with (clinical staff, provider,  specific staff member): CLINICAL STAFF    What was the call regarding: PATIENT STATES SHE IS EXPERIENCING FEVER, COUGH, AND NASAL DRAINAGE. PATIENT WOULD LIKE TO KNOW IF THERE ARE ANY OTC MEDICATIONS SHE CAN TAKE FOR THE COUGH. ALSO, PATIENT WOULD LIKE TO KNOW IF SHE CAN TAKE LORATADINE. PATIENT STATES SHE TESTED NEGATIVE FOR COVID.     Is it okay if the provider responds through MyChart: NO

## 2024-07-03 ENCOUNTER — TELEPHONE (OUTPATIENT)
Dept: CARDIOLOGY | Facility: CLINIC | Age: 74
End: 2024-07-03

## 2024-07-03 ENCOUNTER — TELEPHONE (OUTPATIENT)
Dept: INTERNAL MEDICINE | Facility: CLINIC | Age: 74
End: 2024-07-03
Payer: MEDICARE

## 2024-07-03 ENCOUNTER — TRANSCRIBE ORDERS (OUTPATIENT)
Dept: ADMINISTRATIVE | Facility: HOSPITAL | Age: 74
End: 2024-07-03
Payer: MEDICARE

## 2024-07-03 ENCOUNTER — LAB (OUTPATIENT)
Dept: LAB | Facility: HOSPITAL | Age: 74
End: 2024-07-03
Payer: MEDICARE

## 2024-07-03 DIAGNOSIS — E89.0 POSTSURGICAL HYPOTHYROIDISM: ICD-10-CM

## 2024-07-03 DIAGNOSIS — E89.0 POSTSURGICAL HYPOTHYROIDISM: Primary | ICD-10-CM

## 2024-07-03 LAB
T3FREE SERPL-MCNC: 2.96 PG/ML (ref 2–4.4)
T4 SERPL-MCNC: 9.6 MCG/DL (ref 4.5–11.7)
TSH SERPL DL<=0.05 MIU/L-ACNC: 7.29 UIU/ML (ref 0.27–4.2)

## 2024-07-03 PROCEDURE — 36415 COLL VENOUS BLD VENIPUNCTURE: CPT

## 2024-07-03 PROCEDURE — 84481 FREE ASSAY (FT-3): CPT

## 2024-07-03 PROCEDURE — 84443 ASSAY THYROID STIM HORMONE: CPT

## 2024-07-03 PROCEDURE — 84436 ASSAY OF TOTAL THYROXINE: CPT

## 2024-07-03 NOTE — TELEPHONE ENCOUNTER
It is really probably best for her to get this letter from her PCP.  The A-fib is not going to get her out of jury duty, being on a walker might.  Please let her know.  Thanks

## 2024-07-03 NOTE — TELEPHONE ENCOUNTER
Caller: Xu Liza RUIZ    Relationship: Self    Best call back number: [phone] OR CELL [phone] YOU CAN VM    What is the best time to reach you: ANYTIME    Who are you requesting to speak with (clinical staff, provider,  specific staff member): CLINICAL        What was the call regarding: PT RECEIVED SUMMONS FOR JURY [employer]Y YESTERDAY. IS HAS TO BE RETURNED IN 5 DAYS.  SHE HAS AFIB AND IS ON A WALKER. COULD YOU WRITE A LETTER TO EXCUSE HER FROM JURY DUTY PERMANENTLY ?   PLEASE CALL PT.  SOMEONE CAN  LETTER FOR HER

## 2024-07-03 NOTE — TELEPHONE ENCOUNTER
Caller: Liza Mcnair    Relationship: Self    Best call back number:     [phone]      What is the best time to reach you: ANYTIME    Who are you requesting to speak with (clinical staff, provider,  specific staff member): CLINICAL    What was the call regarding: PATIENT CALLING WANTING TO GET AN EXCUSE FOR JURY DUTY DUE TO HEALTH ISSUES THAT SHE CURRENTLY HAS     Is it okay if the provider responds through MyChart: NO

## 2024-07-05 RX ORDER — DILTIAZEM HYDROCHLORIDE 300 MG/1
300 CAPSULE, COATED, EXTENDED RELEASE ORAL DAILY
Qty: 90 CAPSULE | Refills: 0 | Status: SHIPPED | OUTPATIENT
Start: 2024-07-05

## 2024-07-22 RX ORDER — LEVOTHYROXINE SODIUM 175 UG/1
175 TABLET ORAL DAILY
COMMUNITY
Start: 2024-07-11

## 2024-07-22 NOTE — PROGRESS NOTES
"Subjective   Chief Complaint   Patient presents with    Hypertension    Obesity       History of Present Illness   73 y.o. female presents for follow up regarding HTN, HLD and obesity. Stopped taking Metoprolol. It was prescribed by Dr. Hodges for elevated heart rate and dyspnea. It caused Has so after 2 weeks she was advised she could stop taking it. Seeing Lanette Sellers for follow up regarding dyspnea in August.     Denies chest pain but dyspnea continues unchanged. Not using her CPAP lately because it is causing a bump on her nose. \"I need a break from it\" until she sees Dr. Anguiano. Light keeps coming on telling her mask is not fitting properly. Seeing Dr. Anguiano soon.     Follow up with Dr. Salazar in August regarding cervical radiculopathy. Improvement with PT and Tylenol.     Continues with Dr. Bloom regarding hypothyroidism and prediabetes. Adjusted Levothyroxine to 175 mg daily now.     She would like to lose weight. No success after working with dietician for a year. She did go to warm water pool at [facility] but was afraid she would fall getting out. No success with WW in the past.      Patient Active Problem List   Diagnosis    DDD (degenerative disc disease), cervical    Hyperlipidemia    Hypothyroidism    Obstructive sleep apnea syndrome    Osteoarthritis of knee    Paroxysmal atrial fibrillation    Vitamin D deficiency    Prediabetes    Essential hypertension    Morbid obesity with body mass index (BMI) of 40.0 or higher    Coronary artery disease involving native coronary artery of native heart without angina pectoris    Cervical radiculopathy at C5    Spinal stenosis in cervical region       Allergies   Allergen Reactions    Fd&C Yellow #5 (Tartrazine) Itching     Cat scan dye    Iodinated Contrast Media Itching    Iodine Itching    Hydrochlorothiazide Other (See Comments)     weakness    Spironolactone Other (See Comments)     Breast pain/enlargement, soreness, dry mouth    Wound Dressing Adhesive Rash "     Rash with monitor leads       Current Outpatient Medications on File Prior to Visit   Medication Sig Dispense Refill    Cholecalciferol (VITAMIN D3 PO) Take 200 mg by mouth 2 (Two) Times a Day.      coenzyme Q10 100 MG capsule Take 2 capsules by mouth Every Night.      dilTIAZem CD (CARDIZEM CD) 300 MG 24 hr capsule TAKE 1 CAPSULE DAILY 90 capsule 0    levothyroxine (SYNTHROID, LEVOTHROID) 175 MCG tablet Take 1 tablet by mouth Daily.      MAGNESIUM PO Take 200 mg by mouth.      olmesartan (BENICAR) 40 MG tablet TAKE 1 TABLET DAILY 90 tablet 3    rosuvastatin (CRESTOR) 20 MG tablet TAKE 1 TABLET DAILY 90 tablet 0    vitamin B-12 (CYANOCOBALAMIN) 500 MCG tablet Take 1 tablet by mouth Daily.      Xarelto 20 MG tablet TAKE 1 TABLET DAILY WITH DINNER 90 tablet 3    [DISCONTINUED] levothyroxine (SYNTHROID, LEVOTHROID) 150 MCG tablet Daily. 7 DAYS A WEEK ONLY TAKE 150 MCG SYNTHROID. Additional half on Sunday (Patient not taking: Reported on 7/25/2024)       No current facility-administered medications on file prior to visit.       Past Medical History:   Diagnosis Date    Abnormal stress test     Stress echo on 8/21/14 showed hypokinesis of the mid anteroseptum and distal anterior wall.    Allergy to IVP dye     Severe itching    Gastric ulcer     GERD (gastroesophageal reflux disease)     Hemangioma     Hemangioma of liver (right lobe)    Hyperlipidemia     Hypothyroidism     History of Hashimoto's and goiter - s/p total thyroidectomy in 11/12    Migraine     Mitral valve insufficiency     Moderate to severe by RAUL on 9/4/14.  Mild to moderate by echo on 10/28/15.    Mitral valve prolapse     Non-rheumatic tricuspid valve insufficiency     Obstructive sleep apnea syndrome     Osteopenia        Family History   Problem Relation Age of Onset    Goiter Mother     Pancreatic cancer Mother     Heart failure Father     Hypertension Father     Diabetes Sister     Coronary artery disease Sister         Sister with 3 vessel  "CABG at age 62    Heart disease Sister     No Known Problems Maternal Grandmother     No Known Problems Maternal Grandfather     Diabetes Paternal Grandmother     No Known Problems Paternal Grandfather        Social History     Socioeconomic History    Marital status:    Tobacco Use    Smoking status: Never     Passive exposure: Never    Smokeless tobacco: Never   Vaping Use    Vaping status: Never Used   Substance and Sexual Activity    Alcohol use: Not Currently     Comment: Caffeine use: 2 cups daily    Drug use: No    Sexual activity: Not Currently     Partners: Male       Past Surgical History:   Procedure Laterality Date    CHOLECYSTECTOMY  1992    EYE SURGERY      cataract surgery    THYROID SURGERY  06/07/2012    UTERINE FIBROID SURGERY      Several uterine ablation surgeries        The following portions of the patient's history were reviewed and updated as appropriate: problem list, allergies, current medications, past medical history, and past social history.    Review of Systems    Immunization History   Administered Date(s) Administered    Arexvy (RSV, Adults 60+ yrs) 01/09/2024    COVID-19 (PFIZER) Purple Cap Monovalent 02/25/2021, 03/18/2021, 09/29/2021    DTaP 06/20/2023    FLUAD TRI 65YR+ 09/01/2018    FluMist 2-49yrs 10/30/2015    Fluad Quad 65+ 09/21/2020, 10/13/2021    Fluzone High Dose =>65 Years ([employer] ONLY) 09/01/2018, 10/02/2019    Fluzone High-Dose 65+yrs 09/27/2022, 10/25/2023    Hepatitis A 04/26/2018, 10/27/2018    Influenza TIV (IM) 09/21/2016    Influenza, Unspecified 10/18/2017    Pneumococcal Conjugate 13-Valent (PCV13) 10/04/2016    Pneumococcal Polysaccharide (PPSV23) 07/03/2019    Shingrix 07/23/2019, 09/21/2019    Tdap 01/01/2011, 06/20/2023    Zostavax 02/25/2010       Objective   Vitals:    07/25/24 1402   BP: 118/80   Pulse: 88   Resp: 16   Temp: 98.4 °F (36.9 °C)   Weight: 108 kg (237 lb)   Height: 157.5 cm (62.01\")     Body mass index is 43.34 kg/m².  Physical " Exam  Vitals reviewed.   Constitutional:       Appearance: Normal appearance. She is well-developed. She is obese.   HENT:      Head: Normocephalic and atraumatic.   Cardiovascular:      Rate and Rhythm: Normal rate and regular rhythm.      Heart sounds: Normal heart sounds, S1 normal and S2 normal.   Pulmonary:      Effort: Pulmonary effort is normal.      Breath sounds: Normal breath sounds.   Musculoskeletal:      Comments: Ambulates with walker; no clubbing, cyanosis or edema.    Skin:     General: Skin is warm and dry.   Neurological:      Mental Status: She is alert.   Psychiatric:         Mood and Affect: Mood normal.         Behavior: Behavior normal.         Procedures    Assessment & Plan   Diagnoses and all orders for this visit:    1. Essential hypertension (Primary)  Comments:  Controlled. Continue current medications and RTO in 6M.    2. Morbid obesity with body mass index (BMI) of 40.0 or higher  Comments:  Advised HMR warm water aerobics for weight loss. She will make calls.    3. Obstructive sleep apnea syndrome  Comments:  Not currently using her CPAP; reports machine tells her it is not fiting correctly. She is schedule to see Dr. Silvio grier.    Will order labs at her [facility] to avoid unnecessary duplication as she is seeing Dr. Bloom prior to follow up visit with myself.     Records reviewed include previous OV with myself as well as labs.     Return in about 6 months (around 1/25/2025) for Medicare Wellness.

## 2024-07-25 ENCOUNTER — OFFICE VISIT (OUTPATIENT)
Dept: INTERNAL MEDICINE | Facility: CLINIC | Age: 74
End: 2024-07-25
Payer: MEDICARE

## 2024-07-25 VITALS
HEIGHT: 62 IN | TEMPERATURE: 98.4 F | RESPIRATION RATE: 16 BRPM | SYSTOLIC BLOOD PRESSURE: 118 MMHG | WEIGHT: 237 LBS | DIASTOLIC BLOOD PRESSURE: 80 MMHG | HEART RATE: 88 BPM | BODY MASS INDEX: 43.61 KG/M2

## 2024-07-25 DIAGNOSIS — G47.33 OBSTRUCTIVE SLEEP APNEA SYNDROME: Chronic | ICD-10-CM

## 2024-07-25 DIAGNOSIS — I10 ESSENTIAL HYPERTENSION: Primary | Chronic | ICD-10-CM

## 2024-07-25 DIAGNOSIS — E66.01 MORBID OBESITY WITH BODY MASS INDEX (BMI) OF 40.0 OR HIGHER: Chronic | ICD-10-CM

## 2024-07-25 PROCEDURE — 1159F MED LIST DOCD IN RCRD: CPT | Performed by: NURSE PRACTITIONER

## 2024-07-25 PROCEDURE — 99214 OFFICE O/P EST MOD 30 MIN: CPT | Performed by: NURSE PRACTITIONER

## 2024-07-25 PROCEDURE — 1160F RVW MEDS BY RX/DR IN RCRD: CPT | Performed by: NURSE PRACTITIONER

## 2024-07-25 PROCEDURE — 3079F DIAST BP 80-89 MM HG: CPT | Performed by: NURSE PRACTITIONER

## 2024-07-25 PROCEDURE — 1125F AMNT PAIN NOTED PAIN PRSNT: CPT | Performed by: NURSE PRACTITIONER

## 2024-07-25 PROCEDURE — 3074F SYST BP LT 130 MM HG: CPT | Performed by: NURSE PRACTITIONER

## 2024-07-25 PROCEDURE — G2211 COMPLEX E/M VISIT ADD ON: HCPCS | Performed by: NURSE PRACTITIONER

## 2024-07-29 NOTE — PROGRESS NOTES
"Subjective   History of Present Illness: Liza Mcnair is a 74 y.o. female is here today for 3 month follow-up. Ms. Mcnair was last seen on 05-02-24 with complaints of a pulling sensation in her neck into her right arm and numbness in the arm intermittently.     Today, ms. Mcnair reports that her neck and right arm pain are improving. She is no longer going to PT but continues to do exercises at home.  She had to stop using her CPAP for the last month because she felt like it was aggravating her neck the way she had to hold her head.  Since she stopped using the CPAP she has been sleeping better because the neck pain is resolving.  She is also switched to a cervical pillow which has been helping.    History of Present Illness    Tobacco Use: Low Risk  (8/6/2024)    Patient History     Smoking Tobacco Use: Never     Smokeless Tobacco Use: Never     Passive Exposure: Never        The following portions of the patient's history were reviewed and updated as appropriate: allergies, current medications, past family history, past medical history, past social history, past surgical history and problem list.    Review of Systems   Musculoskeletal:  Positive for neck pain.       Objective     Vitals:    08/06/24 1305   BP: 120/70   Weight: 108 kg (237 lb)   Height: 157.5 cm (62.01\")     Body mass index is 43.33 kg/m².      Physical Exam  Neurologic Exam    Physical Exam:    CONSTITUTIONAL:  appears well developed, well-nourished and in no acute distress.    NECK: the neck is supple and symmetric. The trachea is midline with no masses.      PULMONARY: Respiratory effort is normal with no increased work of breathing or signs of respiratory distress.    CARDIOVASCULAR: Pedal pulses are +2/4 bilaterally. Examination of the extremities shows no edema or varicosities.    MUSCULOSKELETAL: Gait she is using a walker today    SKIN: The skin is warm, dry and intact.      NEUROLOGIC:   Normal motor strength noted. Muscle bulk and tone " are normal.  Sensory exam is normal to fine touch  Sensations intact in all dermatomes of both upper extremities  Reflexes diminished but symmetrical  Office between skin clonus are all negative  Cortical function is intact and without deficits. Speech is normal.    PSYCHIATRIC: oriented to person, place and time. Patient's mood and affect are normal.    Assessment & Plan   Independent Review of Radiographic Studies:      I personally reviewed the images from the following studies.    MRI of the cervical spine done at [facility] on February 19, 2024 reveals multilevel degenerative change throughout the cervical spine with no evidence of any focal disc herniation.  Most prominent degenerative change at C4-C5 where there is moderate to severe facet degenerative changes on the right associated with some edematous changes.  Mild to moderate overall canal stenosis with mild neuroforaminal narrowing is seen at that level bilaterally.            Medical Decision Making:      She does not have any active symptoms referable to the C5 distribution at this time as she says she is able to control the neck and shoulder pain with a therapeutic exercise.  In that scenario there is no reason to pursue the thought of surgical treatment.  If she has progressive and recurrent right shoulder pain, weakness or numbness we can reevaluate the option of considering surgery.    Return if symptoms worsen or fail to improve.    Diagnoses and all orders for this visit:    1. Spinal stenosis in cervical region (Primary)    2. DDD (degenerative disc disease), cervical             Ki Salazar MD FACS FAANS  Neurological Surgery

## 2024-08-06 ENCOUNTER — OFFICE VISIT (OUTPATIENT)
Dept: NEUROSURGERY | Facility: CLINIC | Age: 74
End: 2024-08-06
Payer: MEDICARE

## 2024-08-06 VITALS
SYSTOLIC BLOOD PRESSURE: 120 MMHG | BODY MASS INDEX: 43.61 KG/M2 | WEIGHT: 237 LBS | DIASTOLIC BLOOD PRESSURE: 70 MMHG | HEIGHT: 62 IN

## 2024-08-06 DIAGNOSIS — M48.02 SPINAL STENOSIS IN CERVICAL REGION: Primary | ICD-10-CM

## 2024-08-06 DIAGNOSIS — M50.30 DDD (DEGENERATIVE DISC DISEASE), CERVICAL: ICD-10-CM

## 2024-08-06 PROCEDURE — 1160F RVW MEDS BY RX/DR IN RCRD: CPT | Performed by: NEUROLOGICAL SURGERY

## 2024-08-06 PROCEDURE — 3074F SYST BP LT 130 MM HG: CPT | Performed by: NEUROLOGICAL SURGERY

## 2024-08-06 PROCEDURE — 1159F MED LIST DOCD IN RCRD: CPT | Performed by: NEUROLOGICAL SURGERY

## 2024-08-06 PROCEDURE — 99213 OFFICE O/P EST LOW 20 MIN: CPT | Performed by: NEUROLOGICAL SURGERY

## 2024-08-06 PROCEDURE — 3078F DIAST BP <80 MM HG: CPT | Performed by: NEUROLOGICAL SURGERY

## 2024-08-07 ENCOUNTER — OFFICE VISIT (OUTPATIENT)
Dept: CARDIOLOGY | Facility: CLINIC | Age: 74
End: 2024-08-07
Payer: MEDICARE

## 2024-08-07 VITALS
BODY MASS INDEX: 43.54 KG/M2 | DIASTOLIC BLOOD PRESSURE: 62 MMHG | WEIGHT: 236.6 LBS | SYSTOLIC BLOOD PRESSURE: 118 MMHG | HEART RATE: 93 BPM | OXYGEN SATURATION: 98 % | HEIGHT: 62 IN

## 2024-08-07 DIAGNOSIS — I25.10 CORONARY ARTERY DISEASE INVOLVING NATIVE CORONARY ARTERY OF NATIVE HEART WITHOUT ANGINA PECTORIS: ICD-10-CM

## 2024-08-07 DIAGNOSIS — I34.0 NONRHEUMATIC MITRAL VALVE REGURGITATION: ICD-10-CM

## 2024-08-07 DIAGNOSIS — E66.01 MORBID OBESITY WITH BODY MASS INDEX (BMI) OF 40.0 OR HIGHER: Chronic | ICD-10-CM

## 2024-08-07 DIAGNOSIS — I10 ESSENTIAL HYPERTENSION: Chronic | ICD-10-CM

## 2024-08-07 DIAGNOSIS — I48.21 PERMANENT ATRIAL FIBRILLATION: Primary | ICD-10-CM

## 2024-08-07 DIAGNOSIS — E78.2 MIXED HYPERLIPIDEMIA: ICD-10-CM

## 2024-08-07 DIAGNOSIS — R06.09 DYSPNEA ON EXERTION: ICD-10-CM

## 2024-08-07 DIAGNOSIS — E03.9 ACQUIRED HYPOTHYROIDISM: Chronic | ICD-10-CM

## 2024-08-07 DIAGNOSIS — G47.33 OBSTRUCTIVE SLEEP APNEA SYNDROME: Chronic | ICD-10-CM

## 2024-08-07 DIAGNOSIS — Z01.810 ENCOUNTER FOR PRE-OPERATIVE CARDIOVASCULAR CLEARANCE: ICD-10-CM

## 2024-08-07 RX ORDER — DILTIAZEM HYDROCHLORIDE 360 MG/1
360 CAPSULE, EXTENDED RELEASE ORAL DAILY
Qty: 90 CAPSULE | Refills: 0 | Status: SHIPPED | OUTPATIENT
Start: 2024-08-07

## 2024-08-07 NOTE — PROGRESS NOTES
Date of Office Visit: 2024  Encounter Provider: MIMI Shelton  Place of Service: [facility] CARDIOLOGY  Patient Name: Liza Mcnair  :1950  Primary Cardiologist: Dr. Kelly Hodges    Chief Complaint   Patient presents with    Atrial Fibrillation    Shortness of Breath    Follow-up   :     HPI: Liza Mcnair is a 74 y.o. female who presents today for  cardiac follow-up visit. I reviewed her medical records.     She has known hypertension, hyperlipidemia, hypothyroidism, obesity, and obstructive sleep apnea (compliant with CPAP).     In 2014, she had a Myoview stress test completed and developed atrial fibrillation during the study.  She underwent coronary angiography which revealed nonobstructive CAD.  Follow-up event recorder was normal.    In 2022, she had recurrent atrial fibrillation and was started on rivaroxaban. Echocardiogram was normal except for trace to mild mitral regurgitation. Cardiac PET scan was normal.     In 2024, she saw Dr. Hodges.  She reported chest tightness when lying on her back and dyspnea.  Her heart rate was elevated at 103 bpm.  She was recommended start metoprolol succinate 25 mg nightly.  She cannot take digoxin due to yellow dye allergy and cannot wear Holter monitors due to adhesive allergy.  She was recommended follow-up in 6 weeks for reassessment of dyspnea and determine if she needed a repeat echocardiogram.    She presents today for follow-up visit.  She tried the metoprolol, but it was discontinued due to daily headaches.  She continues to experience some dyspnea with exertion and occasional rapid heartbeat with exertion.  Denies chest pain, dizziness, or syncope.      Past Medical History:   Diagnosis Date    Abnormal stress test     Stress echo on 14 showed hypokinesis of the mid anteroseptum and distal anterior wall.    Allergy to IVP dye     Severe itching    Gastric ulcer     GERD  (gastroesophageal reflux disease)     Hemangioma     Hemangioma of liver (right lobe)    Hyperlipidemia     Hypothyroidism     History of Hashimoto's and goiter - s/p total thyroidectomy in 11/12    Migraine     Mitral valve insufficiency     Moderate to severe by RAUL on 9/4/14.  Mild to moderate by echo on 10/28/15.    Obstructive sleep apnea syndrome     Osteopenia        Past Surgical History:   Procedure Laterality Date    CHOLECYSTECTOMY  1992    EYE SURGERY      cataract surgery    THYROID SURGERY  06/07/2012    UTERINE FIBROID SURGERY      Several uterine ablation surgeries        Social History     Socioeconomic History    Marital status:    Tobacco Use    Smoking status: Never     Passive exposure: Never    Smokeless tobacco: Never   Vaping Use    Vaping status: Never Used   Substance and Sexual Activity    Alcohol use: Not Currently     Comment: Caffeine use: 2 cups daily    Drug use: No    Sexual activity: Not Currently     Partners: Male       Family History   Problem Relation Age of Onset    Goiter Mother     Pancreatic cancer Mother     Heart failure Father     Hypertension Father     Diabetes Sister     Coronary artery disease Sister         Sister with 3 vessel CABG at age 62    Heart disease Sister     No Known Problems Maternal Grandmother     No Known Problems Maternal Grandfather     Diabetes Paternal Grandmother     No Known Problems Paternal Grandfather        The following portion of the patient's history were reviewed and updated as appropriate: past medical history, past surgical history, past social history, past family history, allergies, current medications, and problem list.    Review of Systems   Constitutional: Negative.   Cardiovascular:  Positive for dyspnea on exertion and palpitations.   Respiratory: Negative.     Hematologic/Lymphatic: Negative.    Neurological: Negative.        Allergies   Allergen Reactions    Fd&C Yellow #5 (Tartrazine) Itching     Cat scan dye     "Iodinated Contrast Media Itching    Iodine Itching    Hydrochlorothiazide Other (See Comments)     weakness    Spironolactone Other (See Comments)     Breast pain/enlargement, soreness, dry mouth    Wound Dressing Adhesive Rash     Rash with monitor leads         Current Outpatient Medications:     Cholecalciferol (VITAMIN D3 PO), Take 200 mg by mouth 2 (Two) Times a Day., Disp: , Rfl:     coenzyme Q10 100 MG capsule, Take 2 capsules by mouth Every Night., Disp: , Rfl:     dilTIAZem CD (CARDIZEM CD) 300 MG 24 hr capsule, Take 1 capsule by mouth Daily., Disp: 90 capsule, Rfl: 0    levothyroxine (SYNTHROID, LEVOTHROID) 175 MCG tablet, Take 1 tablet by mouth Daily., Disp: , Rfl:     MAGNESIUM GLYCINATE PO, Take 360 mg by mouth Daily., Disp: , Rfl:     olmesartan (BENICAR) 40 MG tablet, TAKE 1 TABLET DAILY, Disp: 90 tablet, Rfl: 3    rosuvastatin (CRESTOR) 20 MG tablet, TAKE 1 TABLET DAILY, Disp: 90 tablet, Rfl: 0    vitamin B-12 (CYANOCOBALAMIN) 500 MCG tablet, Take 1 tablet by mouth Daily., Disp: , Rfl:     Xarelto 20 MG tablet, TAKE 1 TABLET DAILY WITH DINNER, Disp: 90 tablet, Rfl: 3         Objective:     Vitals:    08/07/24 1403   BP: 118/62   BP Location: Left arm   Patient Position: Sitting   Cuff Size: Adult   Pulse: 93   SpO2: 98%   Weight: 107 kg (236 lb 9.6 oz)   Height: 157.5 cm (62.01\")     Body mass index is 43.26 kg/m².    PHYSICAL EXAM:    Vitals Reviewed.   General Appearance: No acute distress, well developed and well nourished. Obese.   HENT: No hearing loss noted.    Respiratory: No signs of respiratory distress. Respiration rhythm and depth normal.  Clear to auscultation.   Cardiovascular:  Jugular Venous Pressure: Normal  Heart Rate and Rhythm: Irregular, irregular.  Heart Sounds: Normal S1 and S2. No S3 or S4 noted.  Murmurs: No murmurs noted. No rubs, thrills, or gallops.   Lower Extremities: No edema noted.  Musculoskeletal: Normal movement of extremities.  Ambulating with walker.  Skin: General " appearance normal.    Psychiatric: Patient alert and oriented to person, place, and time. Speech and behavior appropriate. Normal mood and affect.       ECG 12 Lead    Date/Time: 8/7/2024 2:00 PM  Performed by: Lanette Sellers APRN    Authorized by: Lanette Sellers APRN  Comparison: compared with previous ECG from 6/5/2024  Similar to previous ECG  Rhythm: atrial fibrillation  Rate: normal  BPM: 93  Conduction: conduction normal  ST Segments: ST segments normal  T Waves: T waves normal  QRS axis: normal    Clinical impression: abnormal EKG            Assessment:       Diagnosis Plan   1. Permanent atrial fibrillation        2. Dyspnea on exertion        3. Coronary artery disease involving native coronary artery of native heart without angina pectoris        4. Nonrheumatic mitral valve regurgitation        5. Essential hypertension        6. Mixed hyperlipidemia        7. Acquired hypothyroidism        8. Obstructive sleep apnea syndrome        9. Morbid obesity with body mass index (BMI) of 40.0 or higher        10. Encounter for pre-operative cardiovascular clearance               Plan:       1.  Permanent Atrial Fibrillation: Heart rate better controlled today.  Dr. Hodges was suspicious that potentially her heart rate was elevated with exertion causing the dyspnea with exertion.  She was tried on metoprolol, but this was discontinued due to daily headaches.  I recommended a trial of increasing diltiazem from 300 mg to 360 mg daily.  Her dyspnea may just be multifactorial. She has a [id number] score of 4 and remains on rivaroxaban.      2.  Dyspnea with exertion.  Will see with a lower heart rate if her dyspnea improves.  Her dyspnea with exertion may just be multifactorial.     3.  Nonobstructive coronary artery disease noted per heart catheterization in 2014.  Denies angina.  She is not on aspirin because of the rivaroxaban.  Continue rosuvastatin.    4.  Trace to mild mitral regurgitation per  echocardiogram.     5.  Hypertension: Blood pressure well-controlled.     5.  Hyperlipidemia: Remains on rosuvastatin.     7.  Hypothyroidism: On levothyroxine.    8.  Obstructive Sleep Apnea: Compliant with CPAP.     9.  Obesity: Body mass index is 43.26 kg/m².  Her PCP recommended the HMR program at [facility].  She may benefit from weight watchers as well and using a pool for exercise.    10.  She is inquiring about knee and cervical surgery.  She is not sure that she needs to have either surgery.  If she were to have surgery, she would be recommended to hold the rivaroxaban and we discussed that there is potential, nonmodifiable risk of stroke.  We also discussed that her quality of life may improve if she has those surgeries.  She is going to think about it.     11.  She will follow-up with me in 4 to 6 weeks.    As always, it has been a pleasure to participate in your patient's care. Thank you.         Sincerely,         MIMI Pineda  [facility] Cardiology      Dictated utilizing Dragon Dictation  I spent 30 minutes reviewing her medical records/testing/previous office notes/labs, face-to-face interaction with patient, physical examination, formulating the plan of care, and discussion of plan of care with patient.

## 2024-08-21 DIAGNOSIS — E78.2 MIXED HYPERLIPIDEMIA: ICD-10-CM

## 2024-08-21 RX ORDER — ROSUVASTATIN CALCIUM 20 MG/1
20 TABLET, COATED ORAL DAILY
Qty: 90 TABLET | Refills: 0 | Status: SHIPPED | OUTPATIENT
Start: 2024-08-21

## 2024-09-19 ENCOUNTER — HOSPITAL ENCOUNTER (OUTPATIENT)
Dept: CARDIOLOGY | Facility: HOSPITAL | Age: 74
Discharge: HOME | End: 2024-09-19
Admitting: NURSE PRACTITIONER
Payer: MEDICARE

## 2024-09-19 ENCOUNTER — OFFICE VISIT (OUTPATIENT)
Dept: CARDIOLOGY | Facility: CLINIC | Age: 74
End: 2024-09-19
Payer: MEDICARE

## 2024-09-19 VITALS
HEIGHT: 62 IN | HEART RATE: 79 BPM | OXYGEN SATURATION: 99 % | SYSTOLIC BLOOD PRESSURE: 138 MMHG | BODY MASS INDEX: 43.79 KG/M2 | WEIGHT: 238 LBS | DIASTOLIC BLOOD PRESSURE: 82 MMHG

## 2024-09-19 DIAGNOSIS — E66.01 MORBID OBESITY WITH BODY MASS INDEX (BMI) OF 40.0 OR HIGHER: Chronic | ICD-10-CM

## 2024-09-19 DIAGNOSIS — R06.09 DYSPNEA ON EXERTION: ICD-10-CM

## 2024-09-19 DIAGNOSIS — I25.10 CORONARY ARTERY DISEASE INVOLVING NATIVE CORONARY ARTERY OF NATIVE HEART WITHOUT ANGINA PECTORIS: ICD-10-CM

## 2024-09-19 DIAGNOSIS — G47.33 OBSTRUCTIVE SLEEP APNEA SYNDROME: Chronic | ICD-10-CM

## 2024-09-19 DIAGNOSIS — I48.21 PERMANENT ATRIAL FIBRILLATION: ICD-10-CM

## 2024-09-19 DIAGNOSIS — I48.21 PERMANENT ATRIAL FIBRILLATION: Primary | ICD-10-CM

## 2024-09-19 DIAGNOSIS — I10 ESSENTIAL HYPERTENSION: Chronic | ICD-10-CM

## 2024-09-19 LAB
ALBUMIN SERPL-MCNC: 4.4 G/DL (ref 3.5–5.2)
ALBUMIN/GLOB SERPL: 1.8 G/DL
ALP SERPL-CCNC: 79 U/L (ref 39–117)
ALT SERPL W P-5'-P-CCNC: 14 U/L (ref 1–33)
ANION GAP SERPL CALCULATED.3IONS-SCNC: 10 MMOL/L (ref 5–15)
AST SERPL-CCNC: 12 U/L (ref 1–32)
BASOPHILS # BLD AUTO: 0.04 10*3/MM3 (ref 0–0.2)
BASOPHILS NFR BLD AUTO: 0.5 % (ref 0–1.5)
BILIRUB SERPL-MCNC: 0.6 MG/DL (ref 0–1.2)
BUN SERPL-MCNC: 13 MG/DL (ref 8–23)
BUN/CREAT SERPL: 17.8 (ref 7–25)
CALCIUM SPEC-SCNC: 9.5 MG/DL (ref 8.6–10.5)
CHLORIDE SERPL-SCNC: 106 MMOL/L (ref 98–107)
CO2 SERPL-SCNC: 25 MMOL/L (ref 22–29)
CREAT SERPL-MCNC: 0.73 MG/DL (ref 0.57–1)
DEPRECATED RDW RBC AUTO: 41.4 FL (ref 37–54)
EGFRCR SERPLBLD CKD-EPI 2021: 85.9 ML/MIN/1.73
EOSINOPHIL # BLD AUTO: 0.08 10*3/MM3 (ref 0–0.4)
EOSINOPHIL NFR BLD AUTO: 0.9 % (ref 0.3–6.2)
ERYTHROCYTE [DISTWIDTH] IN BLOOD BY AUTOMATED COUNT: 11.8 % (ref 12.3–15.4)
GLOBULIN UR ELPH-MCNC: 2.5 GM/DL
GLUCOSE SERPL-MCNC: 98 MG/DL (ref 65–99)
HCT VFR BLD AUTO: 42.3 % (ref 34–46.6)
HGB BLD-MCNC: 13.7 G/DL (ref 12–15.9)
IMM GRANULOCYTES # BLD AUTO: 0.01 10*3/MM3 (ref 0–0.05)
IMM GRANULOCYTES NFR BLD AUTO: 0.1 % (ref 0–0.5)
LYMPHOCYTES # BLD AUTO: 2.3 10*3/MM3 (ref 0.7–3.1)
LYMPHOCYTES NFR BLD AUTO: 26.9 % (ref 19.6–45.3)
MCH RBC QN AUTO: 31.1 PG (ref 26.6–33)
MCHC RBC AUTO-ENTMCNC: 32.4 G/DL (ref 31.5–35.7)
MCV RBC AUTO: 95.9 FL (ref 79–97)
MONOCYTES # BLD AUTO: 0.84 10*3/MM3 (ref 0.1–0.9)
MONOCYTES NFR BLD AUTO: 9.8 % (ref 5–12)
NEUTROPHILS NFR BLD AUTO: 5.29 10*3/MM3 (ref 1.7–7)
NEUTROPHILS NFR BLD AUTO: 61.8 % (ref 42.7–76)
NRBC BLD AUTO-RTO: 0 /100 WBC (ref 0–0.2)
NT-PROBNP SERPL-MCNC: 250 PG/ML (ref 0–1800)
PLATELET # BLD AUTO: 230 10*3/MM3 (ref 140–450)
PMV BLD AUTO: 10.9 FL (ref 6–12)
POTASSIUM SERPL-SCNC: 3.6 MMOL/L (ref 3.5–5.2)
PROT SERPL-MCNC: 6.9 G/DL (ref 6–8.5)
RBC # BLD AUTO: 4.41 10*6/MM3 (ref 3.77–5.28)
SODIUM SERPL-SCNC: 141 MMOL/L (ref 136–145)
T-UPTAKE NFR SERPL: 0.94 TBI (ref 0.8–1.3)
T4 SERPL-MCNC: 11.4 MCG/DL (ref 4.5–11.7)
TSH SERPL DL<=0.05 MIU/L-ACNC: 0.38 UIU/ML (ref 0.27–4.2)
WBC NRBC COR # BLD AUTO: 8.56 10*3/MM3 (ref 3.4–10.8)

## 2024-09-19 PROCEDURE — 84479 ASSAY OF THYROID (T3 OR T4): CPT | Performed by: NURSE PRACTITIONER

## 2024-09-19 PROCEDURE — 84436 ASSAY OF TOTAL THYROXINE: CPT | Performed by: NURSE PRACTITIONER

## 2024-09-19 PROCEDURE — 80053 COMPREHEN METABOLIC PANEL: CPT | Performed by: NURSE PRACTITIONER

## 2024-09-19 PROCEDURE — 85025 COMPLETE CBC W/AUTO DIFF WBC: CPT | Performed by: NURSE PRACTITIONER

## 2024-09-19 PROCEDURE — 84443 ASSAY THYROID STIM HORMONE: CPT | Performed by: NURSE PRACTITIONER

## 2024-09-19 PROCEDURE — 83880 ASSAY OF NATRIURETIC PEPTIDE: CPT | Performed by: NURSE PRACTITIONER

## 2024-09-19 PROCEDURE — 36415 COLL VENOUS BLD VENIPUNCTURE: CPT

## 2024-10-01 ENCOUNTER — TELEPHONE (OUTPATIENT)
Dept: CARDIOLOGY | Facility: CLINIC | Age: 74
End: 2024-10-01
Payer: MEDICARE

## 2024-10-01 NOTE — TELEPHONE ENCOUNTER
Yes, I will fax over the recent blood work to Dr. Bloom. Thanks.   
I called pateint back to informm her that her labs would be faxed over to Dr. Bloom  
Last OV  9/19/24 (TK)    Patient called stating she is seeing her Endocrinologist, Dr. Elsa Bloom today and they will complete some blood work. Patient is asking if the blood work she had completed with you be sent to her Endocrinologist         Please Advise   
n/a

## 2024-10-07 ENCOUNTER — TELEPHONE (OUTPATIENT)
Dept: CARDIOLOGY | Facility: CLINIC | Age: 74
End: 2024-10-07

## 2024-10-07 NOTE — TELEPHONE ENCOUNTER
Caller: Liza Mcnair    Relationship to patient: Self    Best call back number:  [phone]    Patient is needing: PATIENT IS REQUESTING TO HAVE A FEMALE TECHNICIAN ON HER ECHO.

## 2024-10-11 ENCOUNTER — HOSPITAL ENCOUNTER (OUTPATIENT)
Dept: CARDIOLOGY | Facility: HOSPITAL | Age: 74
Discharge: HOME | End: 2024-10-11
Payer: MEDICARE

## 2024-10-11 VITALS
SYSTOLIC BLOOD PRESSURE: 132 MMHG | WEIGHT: 238 LBS | HEIGHT: 62 IN | DIASTOLIC BLOOD PRESSURE: 82 MMHG | BODY MASS INDEX: 43.79 KG/M2

## 2024-10-11 DIAGNOSIS — R06.09 DYSPNEA ON EXERTION: ICD-10-CM

## 2024-10-11 DIAGNOSIS — I48.21 PERMANENT ATRIAL FIBRILLATION: ICD-10-CM

## 2024-10-11 LAB
AORTIC DIMENSIONLESS INDEX: 0.8 (DI)
ASCENDING AORTA: 3.1 CM
BH CV ECHO MEAS - ACS: 1.65 CM
BH CV ECHO MEAS - AO MAX PG: 5.8 MMHG
BH CV ECHO MEAS - AO MEAN PG: 3 MMHG
BH CV ECHO MEAS - AO ROOT DIAM: 3 CM
BH CV ECHO MEAS - AO V2 MAX: 120 CM/SEC
BH CV ECHO MEAS - AO V2 VTI: 21.5 CM
BH CV ECHO MEAS - AVA(I,D): 2.6 CM2
BH CV ECHO MEAS - EDV(CUBED): 91.1 ML
BH CV ECHO MEAS - EDV(MOD-SP2): 84 ML
BH CV ECHO MEAS - EDV(MOD-SP4): 74 ML
BH CV ECHO MEAS - EF(MOD-BP): 61.9 %
BH CV ECHO MEAS - EF(MOD-SP2): 63.1 %
BH CV ECHO MEAS - EF(MOD-SP4): 62.2 %
BH CV ECHO MEAS - ESV(CUBED): 18.2 ML
BH CV ECHO MEAS - ESV(MOD-SP2): 31 ML
BH CV ECHO MEAS - ESV(MOD-SP4): 28 ML
BH CV ECHO MEAS - FS: 41.6 %
BH CV ECHO MEAS - IVS/LVPW: 0.96 CM
BH CV ECHO MEAS - IVSD: 1.2 CM
BH CV ECHO MEAS - LAT PEAK E' VEL: 9 CM/SEC
BH CV ECHO MEAS - LV DIASTOLIC VOL/BSA (35-75): 36 CM2
BH CV ECHO MEAS - LV MASS(C)D: 203.4 GRAMS
BH CV ECHO MEAS - LV MAX PG: 4.3 MMHG
BH CV ECHO MEAS - LV MEAN PG: 2 MMHG
BH CV ECHO MEAS - LV SYSTOLIC VOL/BSA (12-30): 13.6 CM2
BH CV ECHO MEAS - LV V1 MAX: 104 CM/SEC
BH CV ECHO MEAS - LV V1 VTI: 17.7 CM
BH CV ECHO MEAS - LVIDD: 4.5 CM
BH CV ECHO MEAS - LVIDS: 2.6 CM
BH CV ECHO MEAS - LVOT AREA: 3.2 CM2
BH CV ECHO MEAS - LVOT DIAM: 2 CM
BH CV ECHO MEAS - LVPWD: 1.24 CM
BH CV ECHO MEAS - MED PEAK E' VEL: 6.5 CM/SEC
BH CV ECHO MEAS - MR MAX PG: 120.4 MMHG
BH CV ECHO MEAS - MR MAX VEL: 548.7 CM/SEC
BH CV ECHO MEAS - MV DEC SLOPE: 778.1 CM/SEC2
BH CV ECHO MEAS - MV DEC TIME: 0.19 SEC
BH CV ECHO MEAS - MV E MAX VEL: 156 CM/SEC
BH CV ECHO MEAS - MV MAX PG: 6.2 MMHG
BH CV ECHO MEAS - MV MEAN PG: 2.6 MMHG
BH CV ECHO MEAS - MV P1/2T: 51.1 MSEC
BH CV ECHO MEAS - MV V2 VTI: 18.2 CM
BH CV ECHO MEAS - MVA(P1/2T): 4.3 CM2
BH CV ECHO MEAS - MVA(VTI): 3.1 CM2
BH CV ECHO MEAS - PA ACC TIME: 0.07 SEC
BH CV ECHO MEAS - PA V2 MAX: 107.2 CM/SEC
BH CV ECHO MEAS - QP/QS: 0.87
BH CV ECHO MEAS - RAP SYSTOLE: 3 MMHG
BH CV ECHO MEAS - RV MAX PG: 2.7 MMHG
BH CV ECHO MEAS - RV V1 MAX: 82.3 CM/SEC
BH CV ECHO MEAS - RV V1 VTI: 15.7 CM
BH CV ECHO MEAS - RVOT DIAM: 1.99 CM
BH CV ECHO MEAS - RVSP: 32 MMHG
BH CV ECHO MEAS - SUP REN AO DIAM: 2 CM
BH CV ECHO MEAS - SV(LVOT): 55.8 ML
BH CV ECHO MEAS - SV(MOD-SP2): 53 ML
BH CV ECHO MEAS - SV(MOD-SP4): 46 ML
BH CV ECHO MEAS - SV(RVOT): 48.8 ML
BH CV ECHO MEAS - SVI(LVOT): 27.1 ML/M2
BH CV ECHO MEAS - SVI(MOD-SP2): 25.8 ML/M2
BH CV ECHO MEAS - SVI(MOD-SP4): 22.3 ML/M2
BH CV ECHO MEAS - TAPSE (>1.6): 2.21 CM
BH CV ECHO MEAS - TR MAX PG: 28.6 MMHG
BH CV ECHO MEAS - TR MAX VEL: 267.4 CM/SEC
BH CV ECHO MEASUREMENTS AVERAGE E/E' RATIO: 20.13
BH CV XLRA - RV BASE: 3.7 CM
BH CV XLRA - RV LENGTH: 7 CM
BH CV XLRA - RV MID: 2.34 CM
BH CV XLRA - TDI S': 14.4 CM/SEC
LEFT ATRIUM VOLUME INDEX: 51 ML/M2
SINUS: 2.9 CM
STJ: 2.6 CM

## 2024-10-11 PROCEDURE — 93306 TTE W/DOPPLER COMPLETE: CPT

## 2024-10-14 NOTE — PROGRESS NOTES
I recently saw her for dyspnea.  Despite better rate control for A-fib, she remains short of breath.  In 2022, she had trace to mild MR.  This showed possible moderate MR.  Left atrium volume is severely increased.  I may try her on a little furosemide?

## 2024-10-16 ENCOUNTER — TELEPHONE (OUTPATIENT)
Dept: CARDIOLOGY | Facility: CLINIC | Age: 74
End: 2024-10-16
Payer: MEDICARE

## 2024-10-16 NOTE — TELEPHONE ENCOUNTER
Lanette,    Pt called and asked if she could get her echo results. (I saw your message on the echo report, but I was thinking you were waiting for RM's answer.) Is there anything I can go over with the patient?    Thank you,    Yesenia Hernandez RN  Triage [facility]  10/16/24 15:35 EDT

## 2024-10-16 NOTE — TELEPHONE ENCOUNTER
Echocardiogram Result Text       Left ventricular systolic function is normal. Calculated left ventricular EF = 61.9%    Left ventricular wall thickness is consistent with mild concentric hypertrophy.    Left ventricular diastolic function was indeterminate due to A-fib.    There is mild, anterior mitral leaflet thickening present.    Suspect moderate mitral valve regurgitation is present, difficult to assess due to eccentric jet.    The left atrial cavity is severely dilated.    The right atrial cavity is mildly dilated.    Borderline IVC dilatation with reduced respirophasic variation.  Left atrial volume is severely impaired.     I discussed with Dr. Hodges would like to try furosemide.  I left a message for patient to return my call.

## 2024-10-17 RX ORDER — FUROSEMIDE 40 MG
40 TABLET ORAL DAILY
Qty: 30 TABLET | Refills: 0 | Status: SHIPPED | OUTPATIENT
Start: 2024-10-17

## 2024-10-17 NOTE — TELEPHONE ENCOUNTER
I spoke with patient this morning about echocardiogram results.  I explained that she has moderate mitral regurgitation, elevated left atrial volume, and possible diastolic dysfunction.  Recommend follow-up furosemide 40 mg 1 tablet daily in the morning.  She has had adverse effects with other diuretics in the past.  She is willing to try and I will check on her next Thursday or Friday.  She will call with any concerns.

## 2024-10-18 RX ORDER — DILTIAZEM HYDROCHLORIDE 360 MG/1
360 CAPSULE, EXTENDED RELEASE ORAL DAILY
Qty: 90 CAPSULE | Refills: 3 | Status: SHIPPED | OUTPATIENT
Start: 2024-10-18

## 2024-10-24 ENCOUNTER — TELEPHONE (OUTPATIENT)
Dept: CARDIOLOGY | Facility: CLINIC | Age: 74
End: 2024-10-24
Payer: MEDICARE

## 2024-10-24 NOTE — TELEPHONE ENCOUNTER
Patient recently having shortness of breath.  I recommended trial of furosemide 40 mg 1 tablet daily.    Please call to reassess how she is doing on the furosemide if her shortness of breath has improved.  If she is doing well, she will need a repeat BMP.

## 2024-10-24 NOTE — TELEPHONE ENCOUNTER
Called and left VM, will continue to try to reach pt.    HUB- please put patient straight through to triage    Sigrid Munoz, RN  Triage RN  10/24/24 11:59 EDT

## 2024-10-25 NOTE — TELEPHONE ENCOUNTER
Called and left VM, will continue to try to reach pt.    HUB- please put patient straight through to triage    Sigrid Munoz, RN  Triage RN  10/25/24 08:47 EDT

## 2024-10-28 NOTE — TELEPHONE ENCOUNTER
Pt states that she was out of town, and did not want to take a new medicine while she was traveling and did not know how she would react to it.  So she states that she just took the first dose of furosemide this morning.    Sigrid Munoz, RN  Triage RN  10/28/24 13:27 EDT

## 2024-11-04 ENCOUNTER — TELEPHONE (OUTPATIENT)
Dept: CARDIOLOGY | Facility: CLINIC | Age: 74
End: 2024-11-04
Payer: MEDICARE

## 2024-11-04 DIAGNOSIS — R06.02 SHORTNESS OF BREATH: Primary | ICD-10-CM

## 2024-11-04 RX ORDER — FUROSEMIDE 40 MG/1
40 TABLET ORAL DAILY
Qty: 90 TABLET | Refills: 1 | Status: SHIPPED | OUTPATIENT
Start: 2024-11-04

## 2024-11-04 NOTE — TELEPHONE ENCOUNTER
I recently started her on a trial of furosemide 40 mg daily.  The first day that she took 40 mg, she was urinating every minute of the day.  She is currently taking 20 mg daily and feels that her shortness of breath has improved.  She plans to increase to 40 mg at some point.  I recommended a repeat BMP.  She is worried about her kidney and potassium function.

## 2024-11-06 ENCOUNTER — LAB (OUTPATIENT)
Dept: LAB | Facility: HOSPITAL | Age: 74
End: 2024-11-06
Payer: MEDICARE

## 2024-11-06 DIAGNOSIS — R06.02 SHORTNESS OF BREATH: ICD-10-CM

## 2024-11-06 LAB
ANION GAP SERPL CALCULATED.3IONS-SCNC: 9.2 MMOL/L (ref 5–15)
BUN SERPL-MCNC: 18 MG/DL (ref 8–23)
BUN/CREAT SERPL: 22 (ref 7–25)
CALCIUM SPEC-SCNC: 9.1 MG/DL (ref 8.6–10.5)
CHLORIDE SERPL-SCNC: 106 MMOL/L (ref 98–107)
CO2 SERPL-SCNC: 25.8 MMOL/L (ref 22–29)
CREAT SERPL-MCNC: 0.82 MG/DL (ref 0.57–1)
EGFRCR SERPLBLD CKD-EPI 2021: 74.7 ML/MIN/1.73
GLUCOSE SERPL-MCNC: 131 MG/DL (ref 65–99)
POTASSIUM SERPL-SCNC: 4.2 MMOL/L (ref 3.5–5.2)
SODIUM SERPL-SCNC: 141 MMOL/L (ref 136–145)

## 2024-11-06 PROCEDURE — 80048 BASIC METABOLIC PNL TOTAL CA: CPT

## 2024-11-06 PROCEDURE — 36415 COLL VENOUS BLD VENIPUNCTURE: CPT

## 2024-11-07 NOTE — PROGRESS NOTES
Please call patient.  BMP looked good.  Kidney function and potassium normal.  Blood sugar little elevated at 131.  She may not of been fasting.  Continue current dose of furosemide.  Thank you

## 2024-11-19 DIAGNOSIS — E78.2 MIXED HYPERLIPIDEMIA: ICD-10-CM

## 2024-11-19 RX ORDER — ROSUVASTATIN CALCIUM 20 MG/1
20 TABLET, COATED ORAL DAILY
Qty: 90 TABLET | Refills: 0 | Status: SHIPPED | OUTPATIENT
Start: 2024-11-19

## 2024-12-20 ENCOUNTER — TELEPHONE (OUTPATIENT)
Dept: INTERNAL MEDICINE | Facility: CLINIC | Age: 74
End: 2024-12-20
Payer: MEDICARE

## 2024-12-20 NOTE — TELEPHONE ENCOUNTER
Caller: Liza Mcnair    Relationship: Self    Best call back number: [phone]     What is the best time to reach you: ANYTIME    Who are you requesting to speak with (clinical staff, provider,  specific staff member):CLINICAL    What was the call regarding: PATIENT CALLING WANTING TO KNOW IF HERVE LEVY RECEIVED ANYTHING FROM THE [facility] FOR OVARIAN SCREENING.    Is it okay if the provider responds through MyChart: NO

## 2025-01-29 ENCOUNTER — TRANSCRIBE ORDERS (OUTPATIENT)
Dept: ADMINISTRATIVE | Facility: HOSPITAL | Age: 75
End: 2025-01-29
Payer: MEDICARE

## 2025-01-29 ENCOUNTER — LAB (OUTPATIENT)
Dept: LAB | Facility: HOSPITAL | Age: 75
End: 2025-01-29
Payer: MEDICARE

## 2025-01-29 DIAGNOSIS — E06.3 HYPERTHYROIDISM WITH HASHIMOTO DISEASE: ICD-10-CM

## 2025-01-29 DIAGNOSIS — R68.89 OTHER GENERAL SYMPTOMS AND SIGNS: ICD-10-CM

## 2025-01-29 DIAGNOSIS — E05.80 HYPERTHYROIDISM WITH HASHIMOTO DISEASE: ICD-10-CM

## 2025-01-29 DIAGNOSIS — E89.0 POSTSURGICAL HYPOTHYROIDISM: Primary | ICD-10-CM

## 2025-01-29 DIAGNOSIS — E89.0 POSTSURGICAL HYPOTHYROIDISM: ICD-10-CM

## 2025-01-29 LAB
ALBUMIN SERPL-MCNC: 4.1 G/DL (ref 3.5–5.2)
ALBUMIN/GLOB SERPL: 1.2 G/DL
ALP SERPL-CCNC: 81 U/L (ref 39–117)
ALT SERPL W P-5'-P-CCNC: 14 U/L (ref 1–33)
ANION GAP SERPL CALCULATED.3IONS-SCNC: 12.2 MMOL/L (ref 5–15)
AST SERPL-CCNC: 18 U/L (ref 1–32)
BILIRUB SERPL-MCNC: 0.8 MG/DL (ref 0–1.2)
BUN SERPL-MCNC: 17 MG/DL (ref 8–23)
BUN/CREAT SERPL: 18.1 (ref 7–25)
CALCIUM SPEC-SCNC: 9.9 MG/DL (ref 8.6–10.5)
CHLORIDE SERPL-SCNC: 103 MMOL/L (ref 98–107)
CO2 SERPL-SCNC: 25.8 MMOL/L (ref 22–29)
CREAT SERPL-MCNC: 0.94 MG/DL (ref 0.57–1)
EGFRCR SERPLBLD CKD-EPI 2021: 63.4 ML/MIN/1.73
GLOBULIN UR ELPH-MCNC: 3.4 GM/DL
GLUCOSE SERPL-MCNC: 97 MG/DL (ref 65–99)
POTASSIUM SERPL-SCNC: 4.5 MMOL/L (ref 3.5–5.2)
PROT SERPL-MCNC: 7.5 G/DL (ref 6–8.5)
SODIUM SERPL-SCNC: 141 MMOL/L (ref 136–145)
T4 FREE SERPL-MCNC: 1.66 NG/DL (ref 0.92–1.68)
TSH SERPL DL<=0.05 MIU/L-ACNC: 7.17 UIU/ML (ref 0.27–4.2)

## 2025-01-29 PROCEDURE — 36415 COLL VENOUS BLD VENIPUNCTURE: CPT

## 2025-01-29 PROCEDURE — 84439 ASSAY OF FREE THYROXINE: CPT

## 2025-01-29 PROCEDURE — 84443 ASSAY THYROID STIM HORMONE: CPT

## 2025-01-29 PROCEDURE — 80053 COMPREHEN METABOLIC PANEL: CPT

## 2025-01-29 PROCEDURE — 84479 ASSAY OF THYROID (T3 OR T4): CPT

## 2025-01-30 LAB
FT4I SERPL CALC-MCNC: 3.4 (ref 1.2–4.9)
T3RU NFR SERPL: 31 % (ref 24–39)
T4 SERPL-MCNC: 10.9 UG/DL (ref 4.5–12)

## 2025-02-17 ENCOUNTER — TELEPHONE (OUTPATIENT)
Dept: CARDIOLOGY | Facility: CLINIC | Age: 75
End: 2025-02-17

## 2025-02-17 DIAGNOSIS — E78.2 MIXED HYPERLIPIDEMIA: ICD-10-CM

## 2025-02-17 RX ORDER — ROSUVASTATIN CALCIUM 20 MG/1
20 TABLET, COATED ORAL DAILY
Qty: 90 TABLET | Refills: 0 | Status: SHIPPED | OUTPATIENT
Start: 2025-02-17

## 2025-02-17 NOTE — TELEPHONE ENCOUNTER
"  Caller: RONI    Relationship: SELF    Best call back number: [phone]        What was the call regarding: HAD TO CANCEL 2/19/25 DUE TO WEATHER, APPT NOTES STATED WITH \"RM\"  NO AVAIL TILL END OF MAY  "

## 2025-02-26 PROBLEM — M48.02 SPINAL STENOSIS IN CERVICAL REGION: Chronic | Status: ACTIVE | Noted: 2024-03-29

## 2025-02-26 PROBLEM — M54.12 CERVICAL RADICULOPATHY AT C5: Chronic | Status: ACTIVE | Noted: 2024-03-29

## 2025-02-26 NOTE — ASSESSMENT & PLAN NOTE
Patient's (Body mass index is 43.61 kg/m².) indicates that they are morbidly/severely obese (BMI > 40 or > 35 with obesity - related health condition) with health conditions that include obstructive sleep apnea, hypertension, and osteoarthritis . Weight is unchanged. BMI  is above average; BMI management plan is completed. We discussed portion control and increasing exercise.

## 2025-02-26 NOTE — PROGRESS NOTES
Subjective   The ABCs of the Annual Wellness Visit  Medicare Wellness Visit      Liza Mcnair is a 74 y.o. patient who presents for a Medicare Wellness Visit.    The following portions of the patient's history were reviewed and   updated as appropriate: allergies, current medications, past family history, past medical history, past social history, past surgical history, and problem list.    Compared to one year ago, the patient's physical   health is the same.  Compared to one year ago, the patient's mental   health is the same.    Recent Hospitalizations:  She was not admitted to the hospital during the last year.     Current Medical Providers:  Patient Care Team:  Peggy Carias APRN as PCP - General (Family Medicine)  Kelly Hodges MD as Cardiologist (Cardiology)  Elsa Bloom MD as Consulting Physician (Endocrinology)  Neftaly Cooley Jr., MD as Consulting Physician (Pulmonary Disease)  Tamiko De Leon MD as Consulting Physician (Ophthalmology)  Rossana Manzano MD as Consulting Physician (Dermatology)  Diana Cooper MD as Consulting Physician (Gynecology)    Outpatient Medications Prior to Visit   Medication Sig Dispense Refill    Cholecalciferol (VITAMIN D3 PO) Take 200 mg by mouth 2 (Two) Times a Day.      coenzyme Q10 100 MG capsule Take 2 capsules by mouth Every Night.      dilTIAZem CD (CARDIZEM CD) 360 MG 24 hr capsule TAKE 1 CAPSULE DAILY 90 capsule 3    furosemide (LASIX) 40 MG tablet Take 1 tablet by mouth Daily. 90 tablet 1    levothyroxine (Synthroid) 50 MCG tablet Take 1 tablet by mouth.      levothyroxine (SYNTHROID, LEVOTHROID) 175 MCG tablet Take 1 tablet by mouth Daily. 7 days a week// 50mcg additional on Sunday      MAGNESIUM GLYCINATE PO Take 360 mg by mouth Daily.      olmesartan (BENICAR) 40 MG tablet TAKE 1 TABLET DAILY 90 tablet 3    rosuvastatin (CRESTOR) 20 MG tablet TAKE 1 TABLET DAILY 90 tablet 0    vitamin B-12 (CYANOCOBALAMIN) 500 MCG tablet Take 1  "tablet by mouth Daily.      Xarelto 20 MG tablet TAKE 1 TABLET DAILY WITH DINNER 90 tablet 3     No facility-administered medications prior to visit.     No opioid medication identified on active medication list. I have reviewed chart for other potential  high risk medication/s and harmful drug interactions in the elderly.      Aspirin is not on active medication list.   Anticoagulated with Xarelto .    Patient Active Problem List   Diagnosis    DDD (degenerative disc disease), cervical    Hyperlipidemia    Hypothyroidism    Obstructive sleep apnea syndrome    Osteoarthritis of knee    Vitamin D deficiency    Prediabetes    Essential hypertension    Morbid obesity with body mass index (BMI) of 40.0 or higher    Coronary artery disease involving native coronary artery of native heart without angina pectoris    Cervical radiculopathy at C5    Spinal stenosis in cervical region    Permanent atrial fibrillation     Advance Care Planning Advance Directive is not on file.  ACP discussion was held with the patient during this visit. Patient does not have an advance directive, declines further assistance.            Objective   Vitals:    02/28/25 1404   BP: 122/76   Pulse: 88   Resp: 16   Temp: 97.5 °F (36.4 °C)   Weight: 108 kg (238 lb 8 oz)   Height: 157.5 cm (62.01\")   PainSc: 0-No pain       Estimated body mass index is 43.61 kg/m² as calculated from the following:    Height as of this encounter: 157.5 cm (62.01\").    Weight as of this encounter: 108 kg (238 lb 8 oz).                Does the patient have evidence of cognitive impairment? No                                                                                                Health  Risk Assessment    Smoking Status:  Social History     Tobacco Use   Smoking Status Never    Passive exposure: Never   Smokeless Tobacco Never     Alcohol Consumption:  Social History     Substance and Sexual Activity   Alcohol Use Not Currently    Comment: Caffeine use: 2 cups " daily       Fall Risk Screen  STEADI Fall Risk Assessment was completed, and patient is at LOW risk for falls.Assessment completed on:2025    Depression Screening   Little interest or pleasure in doing things? Not at all   Feeling down, depressed, or hopeless? Not at all   PHQ-2 Total Score 0      Health Habits and Functional and Cognitive Screenin/28/2025     2:11 PM   Functional & Cognitive Status   Do you have difficulty preparing food and eating? No   Do you have difficulty bathing yourself, getting dressed or grooming yourself? No   Do you have difficulty using the toilet? No   Do you have difficulty moving around from place to place? No   Do you have trouble with steps or getting out of a bed or a chair? No   Current Diet Well Balanced Diet   Dental Exam Up to date   Eye Exam Up to date   Exercise (times per week) 0 times per week   Current Exercises Include No Regular Exercise   Do you need help using the phone?  No   Are you deaf or do you have serious difficulty hearing?  No   Do you need help to go to places out of walking distance? No   Do you need help shopping? No   Do you need help preparing meals?  No   Do you need help with housework?  Yes   Do you need help with laundry? Yes   Do you need help taking your medications? No   Do you need help managing money? No   Do you ever drive or ride in a car without wearing a seat belt? No   Have you felt unusual stress, anger or loneliness in the last month? No   Who do you live with? Alone   If you need help, do you have trouble finding someone available to you? No   Have you been bothered in the last four weeks by sexual problems? No   Do you have difficulty concentrating, remembering or making decisions? No           Age-appropriate Screening Schedule:  Refer to the list below for future screening recommendations based on patient's age, sex and/or medical conditions. Orders for these recommended tests are listed in the plan section. The patient  "has been provided with a written plan.    Health Maintenance List  Health Maintenance   Topic Date Due    COVID-19 Vaccine (4 - 2024-25 season) 09/01/2024    ANNUAL WELLNESS VISIT  01/25/2025    LIPID PANEL  07/18/2025    DXA SCAN  02/15/2026    BMI FOLLOWUP  02/28/2026    COLORECTAL CANCER SCREENING  05/03/2026    TDAP/TD VACCINES (3 - Td or Tdap) 06/20/2033    HEPATITIS C SCREENING  Completed    RSV Vaccine - Adults  Completed    INFLUENZA VACCINE  Completed    Pneumococcal Vaccine 50+  Completed    ZOSTER VACCINE  Completed    MAMMOGRAM  Discontinued                                                                                                                                                CMS Preventative Services Quick Reference  Risk Factors Identified During Encounter  Weight loss advised.     The above risks/problems have been discussed with the patient.  Pertinent information has been shared with the patient in the After Visit Summary.  An After Visit Summary and PPPS were made available to the patient.    Follow Up:   Next Medicare Wellness visit to be scheduled in 1 year.         Additional E&M Note during same encounter follows:  Patient has additional, significant, and separately identifiable condition(s)/problem(s) that require work above and beyond the Medicare Wellness Visit     Chief Complaint  Medicare Wellness-subsequent    Subjective   HPI  Liza is also being seen today for HTN and obesity. Denies chest pain. Dyspnea unchanged. Continues Furosemide but only able to tolerate 20 mg daily. No lower extremity edema. Continues to sleep in her recliner. Using her CPAP nightly averaging 6-8 hours.     Continues with Dr. Bloom regarding hypothyroidism and prediabetes; adjusted Levothyroxine. Stopped Metoprolol due to fatigue and feels better.        Objective   Vital Signs:  /76   Pulse 88   Temp 97.5 °F (36.4 °C)   Resp 16   Ht 157.5 cm (62.01\")   Wt 108 kg (238 lb 8 oz)   BMI 43.61 " kg/m²   Physical Exam  Vitals reviewed.   Constitutional:       Appearance: Normal appearance. She is well-developed. She is obese.   HENT:      Head: Normocephalic and atraumatic.      Right Ear: Tympanic membrane, ear canal and external ear normal.      Left Ear: Tympanic membrane, ear canal and external ear normal.      Nose: Nose normal.      Mouth/Throat:      Mouth: Mucous membranes are moist.      Pharynx: Oropharynx is clear. No oropharyngeal exudate or posterior oropharyngeal erythema.   Eyes:      General: No scleral icterus.     Extraocular Movements: Extraocular movements intact.      Conjunctiva/sclera: Conjunctivae normal.      Pupils: Pupils are equal, round, and reactive to light.   Neck:      Thyroid: No thyromegaly.      Vascular: No carotid bruit.   Cardiovascular:      Rate and Rhythm: Normal rate and regular rhythm.      Heart sounds: Normal heart sounds. No murmur heard.  Pulmonary:      Effort: Pulmonary effort is normal.      Breath sounds: Normal breath sounds.   Abdominal:      General: Bowel sounds are normal. There is no distension.      Palpations: Abdomen is soft.      Tenderness: There is no abdominal tenderness.   Musculoskeletal:      Cervical back: Neck supple.      Comments: Ambulates without assistance; no clubbing, cyanosis or edema.    Lymphadenopathy:      Cervical: No cervical adenopathy.   Skin:     General: Skin is warm and dry.   Neurological:      Mental Status: She is alert.   Psychiatric:         Mood and Affect: Mood normal.         Behavior: Behavior normal.                 Assessment and Plan      Medicare annual wellness visit, subsequent    Essential hypertension    Mixed hyperlipidemia     Morbid obesity with body mass index (BMI) of 40.0 or higher  Patient's (Body mass index is 43.61 kg/m².) indicates that they are morbidly/severely obese (BMI > 40 or > 35 with obesity - related health condition) with health conditions that include obstructive sleep apnea,  hypertension, and osteoarthritis . Weight is unchanged. BMI  is above average; BMI management plan is completed. We discussed portion control and increasing exercise.   Acquired hypothyroidism    Prediabetes    Diagnoses and all orders for this visit:    1. Medicare annual wellness visit, subsequent (Primary)  Comments:  150 minutes weekly aerobic exercise advised; water aerobics encouraged. Weight loss strongly encouraged.    2. Essential hypertension  Assessment & Plan:  Controlled. Continue current medications. RTO 6M.     3. Mixed hyperlipidemia  Comments:  Continues Rosuvastatin 20 mg daily; unable to tolerate higher doses due to myalgias. FLP in 6M.    4. Morbid obesity with body mass index (BMI) of 40.0 or higher  Comments:  Weight loss encouraged via dietary changes and water aerobics.  Assessment & Plan:  Patient's (There is no height or weight on file to calculate BMI.) indicates that they are morbidly/severely obese (BMI > 40 or > 35 with obesity - related health condition) with health conditions that include obstructive sleep apnea, hypertension, and osteoarthritis . Weight is unchanged. BMI  is above average; BMI management plan is completed. We discussed portion control and increasing exercise.     5. Acquired hypothyroidism  Comments:  Followed by Dr. Elsa Bloom.  Overview:  Thyroidectomy due to multiple nodules. Followed by Dr. Elsa Bloom.     6. Prediabetes  Comments:  Followed by Dr. Elsa Bloom.        Records reviewed include previous OV with myself and Dr. Bloom as well as labs.     Follow Up   Return in about 6 months (around 8/28/2025) for 30.  Patient was given instructions and counseling regarding her condition or for health maintenance advice. Please see specific information pulled into the AVS if appropriate.

## 2025-02-28 ENCOUNTER — OFFICE VISIT (OUTPATIENT)
Dept: INTERNAL MEDICINE | Facility: CLINIC | Age: 75
End: 2025-02-28
Payer: MEDICARE

## 2025-02-28 VITALS
BODY MASS INDEX: 43.89 KG/M2 | DIASTOLIC BLOOD PRESSURE: 76 MMHG | HEIGHT: 62 IN | HEART RATE: 88 BPM | SYSTOLIC BLOOD PRESSURE: 122 MMHG | RESPIRATION RATE: 16 BRPM | TEMPERATURE: 97.5 F | WEIGHT: 238.5 LBS

## 2025-02-28 DIAGNOSIS — Z00.00 MEDICARE ANNUAL WELLNESS VISIT, SUBSEQUENT: Primary | ICD-10-CM

## 2025-02-28 DIAGNOSIS — E66.01 MORBID OBESITY WITH BODY MASS INDEX (BMI) OF 40.0 OR HIGHER: Chronic | ICD-10-CM

## 2025-02-28 DIAGNOSIS — E78.2 MIXED HYPERLIPIDEMIA: Chronic | ICD-10-CM

## 2025-02-28 DIAGNOSIS — R73.03 PREDIABETES: Chronic | ICD-10-CM

## 2025-02-28 DIAGNOSIS — I10 ESSENTIAL HYPERTENSION: Chronic | ICD-10-CM

## 2025-02-28 DIAGNOSIS — E03.9 ACQUIRED HYPOTHYROIDISM: Chronic | ICD-10-CM

## 2025-02-28 PROBLEM — I48.21 PERMANENT ATRIAL FIBRILLATION: Chronic | Status: ACTIVE | Noted: 2024-08-07

## 2025-02-28 RX ORDER — LEVOTHYROXINE SODIUM 50 UG/1
50 TABLET ORAL
COMMUNITY
Start: 2025-02-05

## 2025-03-20 ENCOUNTER — OFFICE VISIT (OUTPATIENT)
Dept: CARDIOLOGY | Facility: CLINIC | Age: 75
End: 2025-03-20
Payer: MEDICARE

## 2025-03-20 VITALS
WEIGHT: 244 LBS | BODY MASS INDEX: 44.9 KG/M2 | HEART RATE: 80 BPM | HEIGHT: 62 IN | DIASTOLIC BLOOD PRESSURE: 80 MMHG | SYSTOLIC BLOOD PRESSURE: 138 MMHG | OXYGEN SATURATION: 98 %

## 2025-03-20 DIAGNOSIS — G47.33 OSA ON CPAP: ICD-10-CM

## 2025-03-20 DIAGNOSIS — I34.0 NONRHEUMATIC MITRAL VALVE REGURGITATION: ICD-10-CM

## 2025-03-20 DIAGNOSIS — I48.21 PERMANENT ATRIAL FIBRILLATION: Primary | Chronic | ICD-10-CM

## 2025-03-20 DIAGNOSIS — E66.01 MORBID OBESITY WITH BODY MASS INDEX (BMI) OF 40.0 OR HIGHER: Chronic | ICD-10-CM

## 2025-03-20 DIAGNOSIS — R06.09 DYSPNEA ON EXERTION: ICD-10-CM

## 2025-03-20 DIAGNOSIS — I25.10 CORONARY ARTERY DISEASE INVOLVING NATIVE CORONARY ARTERY OF NATIVE HEART WITHOUT ANGINA PECTORIS: Chronic | ICD-10-CM

## 2025-03-20 DIAGNOSIS — I51.89 DIASTOLIC DYSFUNCTION: ICD-10-CM

## 2025-03-20 DIAGNOSIS — I10 ESSENTIAL HYPERTENSION: Chronic | ICD-10-CM

## 2025-03-20 NOTE — PROGRESS NOTES
Date of Office Visit: 2025  Encounter Provider: MIMI Shelton  Place of Service: [facility] CARDIOLOG[facility]  Patient Name: Liza Mcnair  :1950  Primary Cardiologist: Dr. Kelly Hodges    Chief Complaint   Patient presents with   • Follow-up   • Atrial Fibrillation   :     HPI: Liaz Mcnair is a 74 y.o. female who presents today for cardiac follow-up visit.  I reviewed her medical records.    She has known hypertension, hyperlipidemia, hypothyroidism, obesity, and obstructive sleep apnea (compliant with CPAP).     In 2014, she had a Myoview stress test completed and developed atrial fibrillation during the study.  She underwent coronary angiography which revealed nonobstructive CAD.  Follow-up event recorder was normal.     In 2022, she had recurrent atrial fibrillation and was started on rivaroxaban. Echocardiogram was normal except for trace to mild mitral regurgitation. Cardiac PET scan was normal.     In 2024, she reported chest pain, dyspnea, and tachycardia.  She was tried on metoprolol, but this was discontinued due to headaches.  She restarted diltiazem.    In 2024, was experiencing dyspnea.  Echocardiogram showed LVEF 61%, mild LVH, diastolic function indeterminate, biatrial dilation, left atrium volume severely increased, moderate mitral regurgitation, and trace tricuspid regurgitation.  I recommend increasing furosemide to 40 mg daily and this caused increased urination.  She decided to start taking furosemide 20 mg twice per day.    She presents today for a follow-up visit.  Despite increasing the furosemide to a total of 40 mg daily, her shortness of breath is unchanged.  She reports dyspnea with exertion and fast heartbeat with exertion.  She was told years ago by Dr. Cooley that she has asthma.  She has not followed up with pulmonology in a long time.  She has had 1 or 2 episodes of chest pain that were brief.  She  denies PND, orthopnea, cough,  wheezing, palpitations, dizziness, or syncope.  She has trace lower extremity edema present.  Blood pressure and heart rate are stable.      Past Medical History:   Diagnosis Date   • Abnormal stress test     Stress echo on 8/21/14 showed hypokinesis of the mid anteroseptum and distal anterior wall.   • Allergy to IVP dye     Severe itching   • Gastric ulcer    • GERD (gastroesophageal reflux disease)    • Hemangioma     Hemangioma of liver (right lobe)   • Hyperlipidemia    • Hypothyroidism     History of Hashimoto's and goiter - s/p total thyroidectomy in 11/12   • Migraine    • Mitral valve insufficiency     Moderate to severe by RAUL on 9/4/14.  Mild to moderate by echo on 10/28/15.   • Obstructive sleep apnea syndrome    • Osteopenia        Past Surgical History:   Procedure Laterality Date   • CHOLECYSTECTOMY  1992   • EYE SURGERY      cataract surgery   • THYROID SURGERY  06/07/2012   • UTERINE FIBROID SURGERY      Several uterine ablation surgeries        Social History     Socioeconomic History   • Marital status:    Tobacco Use   • Smoking status: Never     Passive exposure: Never   • Smokeless tobacco: Never   Vaping Use   • Vaping status: Never Used   Substance and Sexual Activity   • Alcohol use: Not Currently     Comment: Caffeine use: 2 cups daily   • Drug use: No   • Sexual activity: Not Currently     Partners: Male       Family History   Problem Relation Age of Onset   • Goiter Mother    • Pancreatic cancer Mother    • Heart failure Father    • Hypertension Father    • Diabetes Sister    • Coronary artery disease Sister         Sister with 3 vessel CABG at age 62   • Heart disease Sister    • No Known Problems Maternal Grandmother    • No Known Problems Maternal Grandfather    • Diabetes Paternal Grandmother    • No Known Problems Paternal Grandfather        The following portion of the patient's history were reviewed and updated as appropriate: past medical  "history, past surgical history, past social history, past family history, allergies, current medications, and problem list.    Review of Systems   Constitutional: Negative.   Cardiovascular:  Positive for dyspnea on exertion and leg swelling.   Neurological: Negative.        Allergies   Allergen Reactions   • Fd&C Yellow #5 (Tartrazine) Itching     Cat scan dye   • Iodinated Contrast Media Itching   • Iodine Itching   • Hydrochlorothiazide Other (See Comments)     weakness   • Spironolactone Other (See Comments)     Breast pain/enlargement, soreness, dry mouth   • Wound Dressing Adhesive Rash     Rash with monitor leads         Current Outpatient Medications:   •  Cholecalciferol (VITAMIN D3 PO), Take 200 mg by mouth 2 (Two) Times a Day., Disp: , Rfl:   •  coenzyme Q10 100 MG capsule, Take 2 capsules by mouth Every Night., Disp: , Rfl:   •  dilTIAZem CD (CARDIZEM CD) 360 MG 24 hr capsule, TAKE 1 CAPSULE DAILY, Disp: 90 capsule, Rfl: 3  •  furosemide (LASIX) 40 MG tablet, Take 1 tablet by mouth Daily., Disp: 90 tablet, Rfl: 1  •  levothyroxine (Synthroid) 50 MCG tablet, Take 1 tablet by mouth 1 (One) Time Per Week. TAKES ON SUNDAYS, Disp: , Rfl:   •  levothyroxine (SYNTHROID, LEVOTHROID) 175 MCG tablet, Take 1 tablet by mouth Daily. 7 days a week// 50mcg additional on Sunday, Disp: , Rfl:   •  MAGNESIUM GLYCINATE PO, Take 360 mg by mouth Daily., Disp: , Rfl:   •  olmesartan (BENICAR) 40 MG tablet, TAKE 1 TABLET DAILY, Disp: 90 tablet, Rfl: 3  •  rosuvastatin (CRESTOR) 20 MG tablet, TAKE 1 TABLET DAILY, Disp: 90 tablet, Rfl: 0  •  vitamin B-12 (CYANOCOBALAMIN) 500 MCG tablet, Take 1 tablet by mouth Daily., Disp: , Rfl:   •  Xarelto 20 MG tablet, TAKE 1 TABLET DAILY WITH DINNER, Disp: 90 tablet, Rfl: 3         Objective:     Vitals:    03/20/25 1322   BP: 138/80   BP Location: Left arm   Patient Position: Sitting   Cuff Size: Adult   Pulse: 80   SpO2: 98%   Weight: 111 kg (244 lb)   Height: 157.5 cm (62.01\")     Body " mass index is 44.62 kg/m².    PHYSICAL EXAM:    Vitals Reviewed.   General Appearance: No acute distress, well developed and well nourished. Obese.    HENT: No hearing loss noted.    Respiratory: No signs of respiratory distress. Respiration rhythm and depth normal.  Clear to auscultation.   Cardiovascular:  Jugular Venous Pressure: Normal  Heart Rate and Rhythm: Irregular, irregular.  Heart Sounds: Normal S1 and S2. No S3 or S4 noted.  Murmurs: No murmurs noted. No rubs, thrills, or gallops.   Lower Extremities: Bilateral trace lower extremity edema noted.  Musculoskeletal: Normal movement of extremities.  Skin: General appearance normal.    Psychiatric: Patient alert and oriented to person, place, and time. Speech and behavior appropriate. Normal mood and affect.       ECG 12 Lead    Date/Time: 3/20/2025 1:32 PM  Performed by: Lanette Sellers APRN    Authorized by: Lanette Sellers APRN  Comparison: compared with previous ECG from 9/19/2024  Similar to previous ECG  Rhythm: atrial fibrillation  Rate: normal  BPM: 80  Conduction: conduction normal  QRS axis: normal  Other findings: non-specific ST-T wave changes    Clinical impression: abnormal EKG          Assessment:       Diagnosis Plan   1. Permanent atrial fibrillation        2. Diastolic dysfunction  Adult Transthoracic Echo Complete w/ Color, Spectral and Contrast if Necessary Per Protocol      3. Dyspnea on exertion        4. Nonrheumatic mitral valve regurgitation  Adult Transthoracic Echo Complete w/ Color, Spectral and Contrast if Necessary Per Protocol      5. Coronary artery disease involving native coronary artery of native heart without angina pectoris        6. Essential hypertension        7. LORI on CPAP        8. Morbid obesity with body mass index (BMI) of 40.0 or higher               Plan:       1.  Permanent HR Fibrillation: Asymptomatic.  Rate controlled on diltiazem.  [id number] score of 7 and remains on rivaroxaban.  Denies  bleeding.    2.  Presumed diastolic dysfunction.  Last echocardiogram showed left atrium volume increased.  I increased her furosemide to a total of 40 mg daily and her dyspnea did not improve.  I recommended that she continue with furosemide and she likes to take it 20 mg twice per day.    3.  Dyspnea on exertion.  Multifactorial.  I told her I could refer her back to an asthma specialist or pulmonologist if she wishes.  She is going to think about this.    4.  Moderate mitral regurgitation.  I did not appreciate a heart murmur.  Recheck echocardiogram in October 2025.    5.  Nonobstructive coronary atherosclerosis noted per coronary angiography in 2014.  Risk factor modification discussed.  She is not on aspirin because of the rivaroxaban.  Continue rosuvastatin.    6.  Hypertension: Blood pressure stable today.    7.  Obstructive sleep apnea: Compliant with CPAP.    8.  Obesity: Body mass index is 44.62 kg/m².     9.  She has an appointment to see Dr. Hodges in May and wishes to keep that appointment.  Echocardiogram to be completed in October 2025.    As always, it has been a pleasure to participate in your patient's care. Thank you.         Sincerely,         MIMI Pineda  [facility]      Dictated utilizing Dragon Dictation

## 2025-04-18 DIAGNOSIS — I10 PRIMARY HYPERTENSION: ICD-10-CM

## 2025-04-18 RX ORDER — OLMESARTAN MEDOXOMIL 40 MG/1
40 TABLET ORAL DAILY
Qty: 90 TABLET | Refills: 3 | Status: SHIPPED | OUTPATIENT
Start: 2025-04-18

## 2025-05-02 ENCOUNTER — TELEPHONE (OUTPATIENT)
Dept: CARDIOLOGY | Age: 75
End: 2025-05-02
Payer: MEDICARE

## 2025-05-02 NOTE — TELEPHONE ENCOUNTER
Patient would like to know if Lanette needs her to come in for a potassium level since she has been on lasix since November. Please call and advise patient.

## 2025-05-02 NOTE — TELEPHONE ENCOUNTER
Please let her know that she had a renal function panel completed in March 2025 that I had reviewed.  Her BUN, creatinine, sodium, and potassium level were all normal.

## 2025-05-05 NOTE — TELEPHONE ENCOUNTER
Notified patient of results. Patient verbalized understanding.    Jazz Leon RN  [place] [facility]

## 2025-05-05 NOTE — TELEPHONE ENCOUNTER
Left voicemail for Liza Mcnair requesting callback.    HUB: please transfer to Triage if patient returns call    Thank you,  Lachelle VANN RN  Triage Nurse [facility]  05/05/25   09:23 EDT

## 2025-05-06 ENCOUNTER — LAB (OUTPATIENT)
Dept: LAB | Facility: HOSPITAL | Age: 75
End: 2025-05-06
Payer: MEDICARE

## 2025-05-06 ENCOUNTER — TRANSCRIBE ORDERS (OUTPATIENT)
Dept: ADMINISTRATIVE | Facility: HOSPITAL | Age: 75
End: 2025-05-06
Payer: MEDICARE

## 2025-05-06 DIAGNOSIS — E89.0 POST-SURGICAL HYPOTHYROIDISM: ICD-10-CM

## 2025-05-06 DIAGNOSIS — E06.3 HASHIMOTO THYROIDITIS: ICD-10-CM

## 2025-05-06 DIAGNOSIS — E89.0 POST-SURGICAL HYPOTHYROIDISM: Primary | ICD-10-CM

## 2025-05-06 LAB
ALBUMIN SERPL-MCNC: 4.3 G/DL (ref 3.5–5.2)
ALBUMIN/GLOB SERPL: 1.3 G/DL
ALP SERPL-CCNC: 88 U/L (ref 39–117)
ALT SERPL W P-5'-P-CCNC: 16 U/L (ref 1–33)
ANION GAP SERPL CALCULATED.3IONS-SCNC: 11 MMOL/L (ref 5–15)
AST SERPL-CCNC: 18 U/L (ref 1–32)
BILIRUB SERPL-MCNC: 0.5 MG/DL (ref 0–1.2)
BUN SERPL-MCNC: 18 MG/DL (ref 8–23)
BUN/CREAT SERPL: 20.7 (ref 7–25)
CALCIUM SPEC-SCNC: 9.3 MG/DL (ref 8.6–10.5)
CHLORIDE SERPL-SCNC: 103 MMOL/L (ref 98–107)
CO2 SERPL-SCNC: 27 MMOL/L (ref 22–29)
CREAT SERPL-MCNC: 0.87 MG/DL (ref 0.57–1)
EGFRCR SERPLBLD CKD-EPI 2021: 69.6 ML/MIN/1.73
GLOBULIN UR ELPH-MCNC: 3.2 GM/DL
GLUCOSE SERPL-MCNC: 99 MG/DL (ref 65–99)
POTASSIUM SERPL-SCNC: 4.3 MMOL/L (ref 3.5–5.2)
PROT SERPL-MCNC: 7.5 G/DL (ref 6–8.5)
SODIUM SERPL-SCNC: 141 MMOL/L (ref 136–145)
T3FREE SERPL-MCNC: 2.99 PG/ML (ref 2–4.4)
T4 SERPL-MCNC: 10.8 MCG/DL (ref 4.5–11.7)
TSH SERPL DL<=0.05 MIU/L-ACNC: 1.54 UIU/ML (ref 0.27–4.2)

## 2025-05-06 PROCEDURE — 36415 COLL VENOUS BLD VENIPUNCTURE: CPT

## 2025-05-06 PROCEDURE — 80053 COMPREHEN METABOLIC PANEL: CPT

## 2025-05-06 PROCEDURE — 84481 FREE ASSAY (FT-3): CPT

## 2025-05-06 PROCEDURE — 84436 ASSAY OF TOTAL THYROXINE: CPT

## 2025-05-06 PROCEDURE — 84443 ASSAY THYROID STIM HORMONE: CPT

## 2025-05-19 DIAGNOSIS — E78.2 MIXED HYPERLIPIDEMIA: ICD-10-CM

## 2025-05-19 RX ORDER — ROSUVASTATIN CALCIUM 20 MG/1
20 TABLET, COATED ORAL DAILY
Qty: 90 TABLET | Refills: 3 | Status: SHIPPED | OUTPATIENT
Start: 2025-05-19

## 2025-06-16 RX ORDER — RIVAROXABAN 20 MG/1
20 TABLET, FILM COATED ORAL
Qty: 90 TABLET | Refills: 3 | Status: SHIPPED | OUTPATIENT
Start: 2025-06-16

## 2025-06-17 ENCOUNTER — OFFICE VISIT (OUTPATIENT)
Dept: CARDIOLOGY | Facility: CLINIC | Age: 75
End: 2025-06-17
Payer: MEDICARE

## 2025-06-17 VITALS
HEIGHT: 62 IN | WEIGHT: 253 LBS | SYSTOLIC BLOOD PRESSURE: 130 MMHG | DIASTOLIC BLOOD PRESSURE: 70 MMHG | HEART RATE: 83 BPM | BODY MASS INDEX: 46.56 KG/M2

## 2025-06-17 DIAGNOSIS — I48.21 PERMANENT ATRIAL FIBRILLATION: Primary | ICD-10-CM

## 2025-06-17 DIAGNOSIS — I25.10 CORONARY ARTERY DISEASE INVOLVING NATIVE CORONARY ARTERY OF NATIVE HEART WITHOUT ANGINA PECTORIS: ICD-10-CM

## 2025-06-17 DIAGNOSIS — I34.0 NONRHEUMATIC MITRAL VALVE REGURGITATION: ICD-10-CM

## 2025-06-17 DIAGNOSIS — I10 PRIMARY HYPERTENSION: ICD-10-CM

## 2025-06-17 PROCEDURE — 3078F DIAST BP <80 MM HG: CPT | Performed by: INTERNAL MEDICINE

## 2025-06-17 PROCEDURE — 3075F SYST BP GE 130 - 139MM HG: CPT | Performed by: INTERNAL MEDICINE

## 2025-06-17 PROCEDURE — 1159F MED LIST DOCD IN RCRD: CPT | Performed by: INTERNAL MEDICINE

## 2025-06-17 PROCEDURE — 1160F RVW MEDS BY RX/DR IN RCRD: CPT | Performed by: INTERNAL MEDICINE

## 2025-06-17 PROCEDURE — 93000 ELECTROCARDIOGRAM COMPLETE: CPT | Performed by: INTERNAL MEDICINE

## 2025-06-17 PROCEDURE — 99214 OFFICE O/P EST MOD 30 MIN: CPT | Performed by: INTERNAL MEDICINE

## 2025-06-17 RX ORDER — VERAPAMIL HYDROCHLORIDE 240 MG/1
240 TABLET, FILM COATED, EXTENDED RELEASE ORAL DAILY
Qty: 90 TABLET | Refills: 3 | Status: SHIPPED | OUTPATIENT
Start: 2025-06-17

## 2025-06-17 NOTE — PROGRESS NOTES
Date of Office Visit: 2025  Encounter Provider: Kelly Hodges MD  Place of Service: [facility] CARDIOLOGY  Patient Name: Liza Mcnair  :1950      Patient ID:  Liza Mcnair is a 74 y.o. female is here for  followup for atrial fibrillation        History of Present Illness    She has a history of mild nonobstructive coronary artery disease by cardiac catheterization done 2014, LORI on CPAP, atrial fibrillation, hypothyroidism, osteoarthritis, asthma, hypertension and hyperlipidemia.  She sees Dr. Cooley for asthma.      She does not smoke or use alcohol.      She had a stress study on 2019 which showed a very small area of reversibility in the inferoapical segment, possibly very slight ischemia, ejection fraction 61%.  Showed normal 4-day monitor done 2020.  She wore a Zio patch monitor for 4 days done 2020.  This was a normal monitor.       She was diagnosed with atrial fibrillation 2022 and started on Xarelto. She had some hematuria and saw Dr. Indu Garcia, oncology.  She sent her to see Dr. Ortiz from urology did MRI of the abdomen and pelvis, CT abdomen pelvis and cystoscopy,  no cancer was found, no infections were found.  He recommended she come back in a year. She had a nonischemic stress PET study done 2022.  Echocardiogram done 2022 showed ejection fraction of 63% with normal diastolic function, no significant valvular abnormalities.       She cannot tolerate adhesive for monitors.  She has right arm pain and was told by neurosurgery that she has a right nerve impingement causing this-due to cervical spine disease    Labs in 2025 show normal CMP, normal thyroid panel.  She had normal CBC done 2024.  Echocardiogram done 10/11/2024 showed ejection fraction 62% with mild concentric left ventricular hypertrophy, possible moderate mitral insufficiency, severe left atrial [place], borderline right atrial  enlargement.    She has had more exertional dyspnea.  She has gained weight despite being on furosemide.  She is more sedentary.  She has bilateral knee pain and uses a walker.  She likely needs her knees replaced.  She has had no dizziness or syncope but she does feel her heart racing at times.  She has no chest pain or pressure.  She has no orthopnea or PND.  She has increasing fatigue.    Past Medical History:   Diagnosis Date    Abnormal ECG     AFib    Abnormal stress test     Stress echo on 8/21/14 showed hypokinesis of the mid anteroseptum and distal anterior wall.    Allergy to IVP dye     Severe itching    Arthritis Osteoarthritis in both knees    Asthma     AFib shortness of breath    Atrial fibrillation 2016    Cataract     Both cataracts removed years ago by Dr. hilary De Leon    Cervical disc disorder Feb. 2024    Cholelithiasis     Gallbladder removed at [facility] 1991    Coronary artery disease     Gastric ulcer     GERD (gastroesophageal reflux disease)     Hemangioma     Hemangioma of liver (right lobe)    Hyperlipidemia     Hypertension     Taking Olmesartan Medoxomil    Hypothyroidism     History of Hashimoto's and goiter - s/p total thyroidectomy in 11/12    Migraine     Mitral valve insufficiency     Moderate to severe by RAUL on 9/4/14.  Mild to moderate by echo on 10/28/15.    Obesity     Obstructive sleep apnea syndrome     Osteopenia          Past Surgical History:   Procedure Laterality Date    CHOLECYSTECTOMY  1992    COLONOSCOPY      I have had several routine colonoscopy at [facility]  and [facility].  I do  a checkup with [place] the last two times.    EYE SURGERY      cataract surgery    THYROID SURGERY  06/07/2012    UTERINE FIBROID SURGERY      Several uterine ablation surgeries        Current Outpatient Medications on File Prior to Visit   Medication Sig Dispense Refill    Cholecalciferol (VITAMIN D3 PO) Take 200 mg by mouth 2 (Two) Times a Day.      coenzyme Q10 100 MG  "capsule Take 2 capsules by mouth Every Night.      dilTIAZem CD (CARDIZEM CD) 360 MG 24 hr capsule TAKE 1 CAPSULE DAILY 90 capsule 3    furosemide (LASIX) 40 MG tablet Take 1 tablet by mouth Daily. 90 tablet 1    levothyroxine (SYNTHROID, LEVOTHROID) 175 MCG tablet Take 1 tablet by mouth Daily. 7 days a week// 50mcg additional on Sunday      levothyroxine (SYNTHROID, LEVOTHROID) 50 MCG tablet Take 1 tablet by mouth 1 (One) Time Per Week. TAKES ON SUNDAYS      MAGNESIUM GLYCINATE PO Take 360 mg by mouth Daily.      olmesartan (BENICAR) 40 MG tablet TAKE 1 TABLET DAILY 90 tablet 3    rosuvastatin (CRESTOR) 20 MG tablet TAKE 1 TABLET DAILY 90 tablet 3    vitamin B-12 (CYANOCOBALAMIN) 500 MCG tablet Take 1 tablet by mouth Daily.      Xarelto 20 MG tablet TAKE 1 TABLET DAILY WITH DINNER 90 tablet 3     No current facility-administered medications on file prior to visit.       Social History     Socioeconomic History    Marital status:    Tobacco Use    Smoking status: Never     Passive exposure: Never    Smokeless tobacco: Never   Vaping Use    Vaping status: Never Used   Substance and Sexual Activity    Alcohol use: Not Currently     Comment: Caffeine use: 2 cups daily    Drug use: Never    Sexual activity: Not Currently     Partners: Male             Procedures    ECG 12 Lead    Date/Time: 6/17/2025 10:19 AM  Performed by: Kelly Hodges MD    Authorized by: Kelly Hodges MD  Comparison: compared with previous ECG   Similar to previous ECG  Rhythm: atrial fibrillation  Ectopy: unifocal PVCs  T flattening: all    Clinical impression: abnormal EKG            Objective:      Vitals:    06/17/25 0941   BP: 130/70   Pulse: 83   Weight: 115 kg (253 lb)   Height: 157.5 cm (62.01\")     Body mass index is 46.26 kg/m².    Vitals reviewed.   Constitutional:       General: Not in acute distress.     Appearance: Morbidly obese. Not diaphoretic.   Neck:      Vascular: No carotid bruit or JVD.   Pulmonary:      " Effort: Pulmonary effort is normal.      Breath sounds: Normal breath sounds.   Cardiovascular:      Tachycardia present. Irregularly irregular rhythm.      Murmurs: There is no murmur.      No gallop.  No rub.   Pulses:     Intact distal pulses.      Carotid: 2+ bilaterally.     Radial: 2+ bilaterally.     Dorsalis pedis: 2+ bilaterally.     Posterior tibial: 2+ bilaterally.  Edema:     Peripheral edema absent.   Neurological:      Cranial Nerves: No cranial nerve deficit.       Lab Review:       Assessment:      Diagnosis Plan   1. Permanent atrial fibrillation        2. Primary hypertension        3. Nonrheumatic mitral valve regurgitation        4. Coronary artery disease involving native coronary artery of native heart without angina pectoris            Hypertension, BP goal <120/80.  Well-controlled.  Hyperlipidemia, on rosuvastatin.   Mild nonobstructive CAD, no angina.   Permanent atrial fibrillation, on rivaroxaban. Her [id number] is 3 giving her 3.2% annual risk of stroke.  Her heart rate is high and she has dyspnea-try changing diltiazem to verapamil.  Could not take metoprolol, cannot take digoxin due to a yellow dye allergy.  She cannot tolerate monitors due to adhesive allergy.  LORI is now on CPAP.   Hypothyroidism to removal of her thyroid, stable on replacement.  Hematuria, follows with urology.  Also sees Peggy Krueger  Knee pain, may need knee replacement with orthopedics-she is a stable cardiovascular risk if she needs to have her knees replaced.  Obesity.  Mitral insufficiency, moderate, likely worse by her atrial fibrillation.  Dyspnea on exertion, likely due to deconditioning.     Plan:       See Lanette in 6 weeks to recheck her heart rate on verapamil.  I think if she needs her knees replaced, she is a stable cardiovascular risk to have this.  No other testing at this time.

## 2025-06-30 ENCOUNTER — TELEPHONE (OUTPATIENT)
Dept: INTERNAL MEDICINE | Facility: CLINIC | Age: 75
End: 2025-06-30
Payer: MEDICARE

## 2025-06-30 NOTE — TELEPHONE ENCOUNTER
Her LFTs were normal in May. Her visit with me is not until the end of August. She is seeing Dr. Bloom the day before me and typically has her labs there. We review them and then if needed will order additional tests. Please call and see what she is concerned about.

## 2025-06-30 NOTE — TELEPHONE ENCOUNTER
Pt would like to have lab work done before her appt on 8/26/2025 - she stated that she wants to get her liver enzymes . Please advise

## 2025-07-09 RX ORDER — FUROSEMIDE 40 MG/1
40 TABLET ORAL DAILY
Qty: 90 TABLET | Refills: 3 | Status: SHIPPED | OUTPATIENT
Start: 2025-07-09

## 2025-07-31 ENCOUNTER — OFFICE VISIT (OUTPATIENT)
Dept: CARDIOLOGY | Age: 75
End: 2025-07-31
Payer: MEDICARE

## 2025-07-31 VITALS
SYSTOLIC BLOOD PRESSURE: 138 MMHG | WEIGHT: 249 LBS | BODY MASS INDEX: 45.82 KG/M2 | DIASTOLIC BLOOD PRESSURE: 78 MMHG | OXYGEN SATURATION: 97 % | HEART RATE: 106 BPM | HEIGHT: 62 IN

## 2025-07-31 DIAGNOSIS — I48.21 PERMANENT ATRIAL FIBRILLATION: Primary | Chronic | ICD-10-CM

## 2025-07-31 DIAGNOSIS — I34.0 NONRHEUMATIC MITRAL VALVE REGURGITATION: ICD-10-CM

## 2025-07-31 DIAGNOSIS — R53.83 OTHER FATIGUE: ICD-10-CM

## 2025-07-31 DIAGNOSIS — I25.10 NONOCCLUSIVE CORONARY ATHEROSCLEROSIS OF NATIVE CORONARY ARTERY: ICD-10-CM

## 2025-07-31 DIAGNOSIS — G47.33 OSA ON CPAP: ICD-10-CM

## 2025-07-31 DIAGNOSIS — I10 PRIMARY HYPERTENSION: ICD-10-CM

## 2025-07-31 DIAGNOSIS — I51.89 DIASTOLIC DYSFUNCTION: ICD-10-CM

## 2025-07-31 DIAGNOSIS — R06.02 SHORTNESS OF BREATH: ICD-10-CM

## 2025-07-31 DIAGNOSIS — E66.01 MORBID OBESITY WITH BODY MASS INDEX (BMI) OF 40.0 OR HIGHER: Chronic | ICD-10-CM

## 2025-07-31 RX ORDER — DILTIAZEM HYDROCHLORIDE 360 MG/1
360 CAPSULE, EXTENDED RELEASE ORAL DAILY
Qty: 90 CAPSULE | Refills: 0 | Status: SHIPPED | OUTPATIENT
Start: 2025-07-31

## 2025-07-31 NOTE — PROGRESS NOTES
"  Date of Office Visit: 2025  Encounter Provider: MIMI Shelton  Place of Service: [facility] CARDIOLOGY  Patient Name: Liza Mcnair  :1950  Primary Cardiologist: Dr. Kelly Hodges    No chief complaint on file.  :     HPI: Liza Mcnair is a 75 y.o. female who presents today for cardiac follow-up visit.  I reviewed her medical records.    She has known hypertension, hyperlipidemia, hypothyroidism, obesity, and obstructive sleep apnea (compliant with CPAP).    She has known fibrillation initially diagnosed in 2014 during a stress test.  Coronary angiography at that time showed nonobstructive coronary artery disease.      She has known permanent atrial fibrillation and PVCs.  She has been intolerant to metoprolol (headaches), digoxin (yellow dye allergy), and could not tolerate Holter monitors due to adhesive allergy.    In 2024, was experiencing dyspnea and was treated with furosemide 20 mg twice per day (her preference).  Echocardiogram showed LVEF 61%, mild LVH, diastolic function indeterminate, biatrial dilation, left atrium volume severely increased, moderate mitral regurgitation, and trace tricuspid regurgitation.      In 2025, she saw Dr. Hodges for worsening exertional dyspnea, weight gain, and fatigue.  She reported that she was sedentary.  Dr. Hodges changed her diltiazem to verapamil.  Her dyspnea was felt to be due to deconditioning.    She presents today for a follow-up visit.  She said she was tachycardic on her last visit.  I reviewed the EKG and her heart rate was 83 bpm at that visit.      On today's EKG, her heart rate is 106 taking the new dose of verapamil.  With the verapamil she noticed some vision changes and constipation.  She has also been eating a lot of kale which is contributed to some abdominal discomfort.  She feels like her shortness of breath and fatigue have improved \"a little\" from the change of " diltiazem to her verapamil.  Blood pressure is stable.      Past Medical History:   Diagnosis Date    Abnormal ECG     AFib    Abnormal stress test     Stress echo on 8/21/14 showed hypokinesis of the mid anteroseptum and distal anterior wall.    Allergy to IVP dye     Severe itching    Arthritis Osteoarthritis in both knees    Asthma     AFib shortness of breath    Atrial fibrillation 2016    Cataract     Both cataracts removed years ago by Dr. hilary De Leon    Cervical disc disorder Feb. 2024    Cholelithiasis     Gallbladder removed at [facility] 1991    Coronary artery disease     Gastric ulcer     GERD (gastroesophageal reflux disease)     Hemangioma     Hemangioma of liver (right lobe)    Hyperlipidemia     Hypertension     Taking Olmesartan Medoxomil    Hypothyroidism     History of Hashimoto's and goiter - s/p total thyroidectomy in 11/12    Migraine     Mitral valve insufficiency     Moderate to severe by RAUL on 9/4/14.  Mild to moderate by echo on 10/28/15.    Obesity     Obstructive sleep apnea syndrome     Osteopenia        Past Surgical History:   Procedure Laterality Date    CHOLECYSTECTOMY  1992    COLONOSCOPY      I have had several routine colonoscopy at [facility]  and [facility].  I do  a checkup with [facility] the last two times.    EYE SURGERY      cataract surgery    THYROID SURGERY  06/07/2012    UTERINE FIBROID SURGERY      Several uterine ablation surgeries        Social History     Socioeconomic History    Marital status:    Tobacco Use    Smoking status: Never     Passive exposure: Never    Smokeless tobacco: Never   Vaping Use    Vaping status: Never Used   Substance and Sexual Activity    Alcohol use: Not Currently     Comment: Caffeine use: 2 cups daily    Drug use: Never    Sexual activity: Not Currently     Partners: Male     Birth control/protection: None       Family History   Problem Relation Age of Onset    Goiter Mother     Pancreatic cancer Mother     Cancer  Mother         [facility]--diagnosis  Pancreatic and bone    Thyroid disease Mother         Hashimoto nodules and goiter. Thyroid removed.    Heart failure Father     Hypertension Father     Diabetes Sister         Juvenile diabetes since 12 yrs. old. She is now 76 yrs old.    Coronary artery disease Sister         Sister with 3 vessel CABG at age 62    Heart disease Sister         Surgery    Hypertension Sister     Arthritis Sister         Fingers    Hyperlipidemia Sister     Arthritis Maternal Grandmother     Vision loss Maternal Grandmother         Glaucoma    No Known Problems Maternal Grandfather     Diabetes Paternal Grandmother     No Known Problems Paternal Grandfather     Vision loss Maternal Uncle         Glaucoma    Cancer Paternal Aunt         Breast       The following portion of the patient's history were reviewed and updated as appropriate: past medical history, past surgical history, past social history, past family history, allergies, current medications, and problem list.    Review of Systems   Constitutional: Negative.   Cardiovascular:  Positive for dyspnea on exertion and leg swelling.   Neurological: Negative.        Allergies   Allergen Reactions    Fd&C Yellow #5 (Tartrazine) Itching     Cat scan dye    Iodinated Contrast Media Itching    Iodine Itching    Hydrochlorothiazide Other (See Comments)     weakness    Spironolactone Other (See Comments)     Breast pain/enlargement, soreness, dry mouth    Wound Dressing Adhesive Rash     Rash with monitor leads          Current Outpatient Medications:     Cholecalciferol (VITAMIN D3 PO), Take 200 mg by mouth 2 (Two) Times a Day., Disp: , Rfl:     coenzyme Q10 100 MG capsule, Take 2 capsules by mouth Every Night., Disp: , Rfl:     furosemide (LASIX) 40 MG tablet, TAKE 1 TABLET DAILY, Disp: 90 tablet, Rfl: 3    levothyroxine (SYNTHROID, LEVOTHROID) 175 MCG tablet, Take 1 tablet by mouth Daily. 7 days a week// 50mcg additional on Sunday, Disp: , Rfl:  "    levothyroxine (SYNTHROID, LEVOTHROID) 50 MCG tablet, Take 1 tablet by mouth 1 (One) Time Per Week. TAKES ON SUNDAYS, Disp: , Rfl:     MAGNESIUM GLYCINATE PO, Take 360 mg by mouth Daily., Disp: , Rfl:     olmesartan (BENICAR) 40 MG tablet, TAKE 1 TABLET DAILY, Disp: 90 tablet, Rfl: 3    rosuvastatin (CRESTOR) 20 MG tablet, TAKE 1 TABLET DAILY, Disp: 90 tablet, Rfl: 3    vitamin B-12 (CYANOCOBALAMIN) 500 MCG tablet, Take 1 tablet by mouth Daily., Disp: , Rfl:     Xarelto 20 MG tablet, TAKE 1 TABLET DAILY WITH DINNER, Disp: 90 tablet, Rfl: 3   Verapamil 240 MG 24 hr capsule, Take 1 capsule by mouth Daily., Disp: 90 capsule, Rfl: 0         Objective:     Vitals:    07/31/25 1538   BP: 138/78   BP Location: Left arm   Patient Position: Sitting   Cuff Size: Large Adult   Pulse: 106   SpO2: 97%   Weight: 113 kg (249 lb)   Height: 157.7 cm (62.09\")       Body mass index is 45.42 kg/m².    PHYSICAL EXAM:    Vitals Reviewed.   General Appearance: No acute distress, well developed and well nourished. Obese.    HENT: No hearing loss noted.    Respiratory: No signs of respiratory distress. Respiration rhythm and depth normal.  Clear to auscultation.   Cardiovascular:  Jugular Venous Pressure: Normal  Heart Rate and Rhythm: Irregular, irregular.  Heart Sounds: Normal S1 and S2. No S3 or S4 noted.  Murmurs: No murmurs noted. No rubs, thrills, or gallops.   Lower Extremities: Bilateral trace lower extremity edema noted.  Musculoskeletal: Normal movement of extremities.  Skin: General appearance normal.    Psychiatric: Patient alert and oriented to person, place, and time. Speech and behavior appropriate. Normal mood and affect.       ECG 12 Lead    Date/Time: 7/31/2025 3:27 PM  Performed by: Lanette Sellers APRN    Authorized by: Lanette Sellers APRN  Comparison: compared with previous ECG from 6/17/2025  Similar to previous ECG  Rhythm: atrial fibrillation  Ectopy: unifocal PVCs  Rate: tachycardic  BPM: 106  Q waves: III " and aVF    ST Segments: ST segments normal  T Waves: T waves normal  QRS axis: normal    Clinical impression: abnormal EKG            Assessment:       Diagnosis Plan   1. Permanent atrial fibrillation  ECG 12 Lead               Plan:       1.  Permanent HR Fibrillation: Her heart rate is more elevated today at 106 bpm on the verapamil then she was on the diltiazem.  She is having adverse effects of visual changes and constipation.  I recommended stopping the verapamil and going back on the diltiazem 360 mg 1 tablet a day.  I have asked her to play close attention to her dyspnea and fatigue. [id number] score of 7 and remains on rivaroxaban.  Denies bleeding.    2.  Chronic shortness of breath and fatigue.  Dr. Hodges and I feel that this is really due to deconditioning.  She really was going in the swimming pool, but is not able to go back to the bubble until after Labor Day.  Her knees limits her walk.    3.  Presumed diastolic dysfunction.  Continue daily furosemide.      4.  Moderate mitral regurgitation.  I did not appreciate a heart murmur.  Recheck echocardiogram in October 2025.    5.  Nonobstructive coronary atherosclerosis noted per coronary angiography in 2014.  Risk factor modification discussed.  She is not on aspirin because of the rivaroxaban.  Continue rosuvastatin.    6.  Hypertension: Blood pressure stable today.    7.  Obstructive sleep apnea: Compliant with CPAP.    8.  Obesity: Body mass index is 45.42 kg/m².     9.  We will call her in a few weeks to reassess how she is doing with the switch from verapamil back to diltiazem.  She is scheduled for repeat echocardiogram in October and same-day visit with Dr. Hodges.    As always, it has been a pleasure to participate in your patient's care. Thank you.         Sincerely,         MIMI Pineda  [facility] Cardiology      Dictated utilizing Dragon Dictation

## 2025-08-11 ENCOUNTER — TELEPHONE (OUTPATIENT)
Dept: CARDIOLOGY | Age: 75
End: 2025-08-11
Payer: MEDICARE

## 2025-08-18 ENCOUNTER — TRANSCRIBE ORDERS (OUTPATIENT)
Dept: ADMINISTRATIVE | Facility: HOSPITAL | Age: 75
End: 2025-08-18
Payer: MEDICARE

## 2025-08-18 ENCOUNTER — LAB (OUTPATIENT)
Dept: LAB | Facility: HOSPITAL | Age: 75
End: 2025-08-18
Payer: MEDICARE

## 2025-08-18 DIAGNOSIS — R73.03 PRE-DIABETES: ICD-10-CM

## 2025-08-18 DIAGNOSIS — E89.0 POSTSURGICAL HYPOTHYROIDISM: ICD-10-CM

## 2025-08-18 DIAGNOSIS — E06.3 HASHIMOTO'S THYROIDITIS: ICD-10-CM

## 2025-08-18 DIAGNOSIS — E89.0 POSTSURGICAL HYPOTHYROIDISM: Primary | ICD-10-CM

## 2025-08-18 LAB
ALBUMIN SERPL-MCNC: 4.3 G/DL (ref 3.5–5.2)
ALBUMIN/GLOB SERPL: 1.5 G/DL
ALP SERPL-CCNC: 81 U/L (ref 39–117)
ALT SERPL W P-5'-P-CCNC: 12 U/L (ref 1–33)
ANION GAP SERPL CALCULATED.3IONS-SCNC: 12.2 MMOL/L (ref 5–15)
AST SERPL-CCNC: 15 U/L (ref 1–32)
BILIRUB SERPL-MCNC: 0.8 MG/DL (ref 0–1.2)
BUN SERPL-MCNC: 17 MG/DL (ref 8–23)
BUN/CREAT SERPL: 19.8 (ref 7–25)
CALCIUM SPEC-SCNC: 9.3 MG/DL (ref 8.6–10.5)
CHLORIDE SERPL-SCNC: 105 MMOL/L (ref 98–107)
CO2 SERPL-SCNC: 24.8 MMOL/L (ref 22–29)
CREAT SERPL-MCNC: 0.86 MG/DL (ref 0.57–1)
EGFRCR SERPLBLD CKD-EPI 2021: 70.1 ML/MIN/1.73
GLOBULIN UR ELPH-MCNC: 2.9 GM/DL
GLUCOSE SERPL-MCNC: 101 MG/DL (ref 65–99)
POTASSIUM SERPL-SCNC: 4.1 MMOL/L (ref 3.5–5.2)
PROT SERPL-MCNC: 7.2 G/DL (ref 6–8.5)
SODIUM SERPL-SCNC: 142 MMOL/L (ref 136–145)
T3FREE SERPL-MCNC: 2.89 PG/ML (ref 2–4.4)
T4 SERPL-MCNC: 10.3 MCG/DL (ref 4.5–11.7)
TSH SERPL DL<=0.05 MIU/L-ACNC: 0.9 UIU/ML (ref 0.27–4.2)

## 2025-08-18 PROCEDURE — 84443 ASSAY THYROID STIM HORMONE: CPT

## 2025-08-18 PROCEDURE — 36415 COLL VENOUS BLD VENIPUNCTURE: CPT

## 2025-08-18 PROCEDURE — 80053 COMPREHEN METABOLIC PANEL: CPT

## 2025-08-18 PROCEDURE — 84436 ASSAY OF TOTAL THYROXINE: CPT

## 2025-08-18 PROCEDURE — 84481 FREE ASSAY (FT-3): CPT

## 2025-08-19 ENCOUNTER — TELEPHONE (OUTPATIENT)
Dept: CARDIOLOGY | Age: 75
End: 2025-08-19
Payer: MEDICARE

## 2025-08-26 ENCOUNTER — OFFICE VISIT (OUTPATIENT)
Dept: INTERNAL MEDICINE | Facility: CLINIC | Age: 75
End: 2025-08-26
Payer: MEDICARE

## 2025-08-26 VITALS
HEIGHT: 62 IN | DIASTOLIC BLOOD PRESSURE: 76 MMHG | HEART RATE: 96 BPM | SYSTOLIC BLOOD PRESSURE: 126 MMHG | BODY MASS INDEX: 46.19 KG/M2 | WEIGHT: 251 LBS | OXYGEN SATURATION: 99 % | RESPIRATION RATE: 16 BRPM | TEMPERATURE: 98.1 F

## 2025-08-26 DIAGNOSIS — Z78.0 POST-MENOPAUSAL: ICD-10-CM

## 2025-08-26 DIAGNOSIS — G47.33 OSA ON CPAP: Chronic | ICD-10-CM

## 2025-08-26 DIAGNOSIS — E78.2 MIXED HYPERLIPIDEMIA: Chronic | ICD-10-CM

## 2025-08-26 DIAGNOSIS — E66.01 MORBID OBESITY WITH BODY MASS INDEX (BMI) OF 40.0 OR HIGHER: Chronic | ICD-10-CM

## 2025-08-26 DIAGNOSIS — Z12.31 ENCOUNTER FOR SCREENING MAMMOGRAM FOR MALIGNANT NEOPLASM OF BREAST: ICD-10-CM

## 2025-08-26 DIAGNOSIS — M17.0 OSTEOARTHRITIS OF BOTH KNEES, UNSPECIFIED OSTEOARTHRITIS TYPE: ICD-10-CM

## 2025-08-26 DIAGNOSIS — I10 ESSENTIAL HYPERTENSION: Primary | Chronic | ICD-10-CM
